# Patient Record
Sex: FEMALE | Race: BLACK OR AFRICAN AMERICAN | NOT HISPANIC OR LATINO | Employment: UNEMPLOYED | URBAN - METROPOLITAN AREA
[De-identification: names, ages, dates, MRNs, and addresses within clinical notes are randomized per-mention and may not be internally consistent; named-entity substitution may affect disease eponyms.]

---

## 2017-06-22 ENCOUNTER — TELEPHONE (OUTPATIENT)
Dept: NEUROLOGY | Facility: CLINIC | Age: 67
End: 2017-06-22

## 2017-06-22 NOTE — TELEPHONE ENCOUNTER
----- Message from Shona Licea sent at 6/22/2017 11:56 AM CDT -----  Contact: Patient [phone]  Patient is calling to get a new patient appt due to trouble with balance memory and visually focusing, patient would an caleb for as early as possible. Please call

## 2017-06-23 ENCOUNTER — TELEPHONE (OUTPATIENT)
Dept: NEUROLOGY | Facility: CLINIC | Age: 67
End: 2017-06-23

## 2017-06-23 ENCOUNTER — TELEPHONE (OUTPATIENT)
Dept: TRANSPLANT | Facility: CLINIC | Age: 67
End: 2017-06-23

## 2017-06-23 NOTE — TELEPHONE ENCOUNTER
Spoke with the patient about her current  symptoms that she was having currently (patient reports having problems with GERD).  Patient informed that a [facility] doctor will be the best choice to treat that problem.  Patient stated that she currently has an appointment with the GI doctor.

## 2017-06-23 NOTE — TELEPHONE ENCOUNTER
----- Message from Adina Santos sent at 6/23/2017 10:55 AM CDT -----  Contact: patient   Patient calling to schedule an appt to see a liver specialist. Patient states she having problems digesting and has a history of diabetes and pancrease problems. Please call

## 2017-06-23 NOTE — TELEPHONE ENCOUNTER
----- Message from Yissel Rodriguez sent at 6/22/2017  1:40 PM CDT -----  Contact: Pt   Pt would like to schedule a new patient appt for memory loss & dizziness,  [facility] insurance, she requesting 1st or last appt of the day     Pt contact number [phone]  Thanks

## 2017-06-27 ENCOUNTER — OFFICE VISIT (OUTPATIENT)
Dept: OTOLARYNGOLOGY | Facility: CLINIC | Age: 67
End: 2017-06-27
Payer: COMMERCIAL

## 2017-06-27 VITALS
TEMPERATURE: 99 F | BODY MASS INDEX: 52.82 KG/M2 | DIASTOLIC BLOOD PRESSURE: 73 MMHG | HEART RATE: 74 BPM | SYSTOLIC BLOOD PRESSURE: 124 MMHG | WEIGHT: 288.81 LBS

## 2017-06-27 DIAGNOSIS — R49.9 CHANGE IN VOICE: ICD-10-CM

## 2017-06-27 DIAGNOSIS — F44.4 FUNCTIONAL DYSPHONIA: Primary | ICD-10-CM

## 2017-06-27 PROCEDURE — 99999 PR PBB SHADOW E&M-EST. PATIENT-LVL III: CPT | Mod: PBBFAC,,, | Performed by: OTOLARYNGOLOGY

## 2017-06-27 PROCEDURE — 99499 UNLISTED E&M SERVICE: CPT | Mod: S$GLB,,, | Performed by: OTOLARYNGOLOGY

## 2017-06-27 PROCEDURE — 31575 DIAGNOSTIC LARYNGOSCOPY: CPT | Mod: S$GLB,,, | Performed by: OTOLARYNGOLOGY

## 2017-06-27 PROCEDURE — 99203 OFFICE O/P NEW LOW 30 MIN: CPT | Mod: 25,S$GLB,, | Performed by: OTOLARYNGOLOGY

## 2017-06-27 RX ORDER — HYDROXYZINE HYDROCHLORIDE 25 MG/1
25 TABLET, FILM COATED ORAL 4 TIMES DAILY
Qty: 60 TABLET | Refills: 3 | Status: SHIPPED | OUTPATIENT
Start: 2017-06-27 | End: 2017-11-07 | Stop reason: SDUPTHER

## 2017-06-27 NOTE — PROGRESS NOTES
"Subjective:       Patient ID: Ni Montejo is a 66 y.o. female.    Chief Complaint: Consult (throat problems/voice change)    HPI     Ni Montejo is a 66 year old female who was [facility] for a 1 year history of vocal changes. 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 She has dysphagia to foods such as rice. She feels that it will get stuck (pointing to her upper chest) and she will have to swallow water to help "push the food down." When this happens, it is painful. She takes Atarax for itching and feels this helps relax her throat. She is occasionally short of breath with exertion. She does complain of acid reflux and is set up to see GI. She is a non smoker.     Past Medical History:   Diagnosis Date    Hypertension     Memory changes        History reviewed. No pertinent surgical history.      Current Outpatient Prescriptions:     hydrOXYzine HCl (ATARAX) 25 MG tablet, Take 1 tablet (25 mg total) by mouth 4 (four) times daily., Disp: 60 tablet, Rfl: 3    lisinopril-hydrochlorothiazide (PRINZIDE,ZESTORETIC) 20-25 mg Tab, Take 1 tablet by mouth once daily., Disp: , Rfl:     multivitamin capsule, Take 1 capsule by mouth once daily., Disp: , Rfl:     Review of patient's allergies indicates:   Allergen Reactions    Ducodyl [bisacodyl]     Iodine and iodide containing products      Seafood       Social History     Social History    Marital status: Single     Spouse name: N/A    Number of children: N/A    Years of education: N/A     Occupational History    Not on file.     Social History Main Topics    Smoking status: Never Smoker    Smokeless tobacco: Never Used    Alcohol use No    Drug use: Unknown    Sexual activity: Not on file     Other Topics Concern    Not on file     Social History Narrative    No narrative on file       Family History   Problem " Relation Age of Onset    Coronary artery disease Mother     Coronary artery disease Father        Review of Systems   Constitutional: Negative for appetite change, chills, diaphoresis, fatigue, fever and unexpected weight change.   HENT: Positive for trouble swallowing and voice change. Negative for congestion, dental problem, drooling, ear discharge, ear pain, facial swelling, hearing loss, mouth sores, nosebleeds, postnasal drip, rhinorrhea, sinus pressure, sneezing, sore throat and tinnitus.    Eyes: Positive for visual disturbance. Negative for pain, discharge, redness and itching.   Respiratory: Positive for shortness of breath. Negative for cough.    Cardiovascular: Positive for palpitations. Negative for chest pain.   Gastrointestinal: Negative for abdominal distention, abdominal pain, diarrhea, nausea and vomiting.   Endocrine: Negative for cold intolerance and heat intolerance.   Genitourinary: Negative for difficulty urinating.   Musculoskeletal: Negative for neck pain and neck stiffness.   Skin: Negative for rash.   Neurological: Negative for dizziness, weakness and headaches.   Hematological: Negative for adenopathy.       Objective:      Physical Exam   Constitutional: She is oriented to person, place, and time. She appears well-developed and well-nourished. She is cooperative. She does not appear ill. No distress.   HENT:   Head: Normocephalic and atraumatic.   Right Ear: Hearing, tympanic membrane, external ear and ear canal normal.   Left Ear: Hearing, tympanic membrane, external ear and ear canal normal.   Nose: Nose normal. No mucosal edema, rhinorrhea or nasal deformity. No epistaxis.  No foreign bodies. Right sinus exhibits no maxillary sinus tenderness and no frontal sinus tenderness. Left sinus exhibits no maxillary sinus tenderness and no frontal sinus tenderness.   Mouth/Throat: Uvula is midline, oropharynx is clear and moist and mucous membranes are normal. Mucous membranes are not pale,  not dry and not cyanotic. She does not have dentures. No oral lesions. No trismus in the jaw. Normal dentition. No uvula swelling or dental caries. No oropharyngeal exudate, posterior oropharyngeal edema, posterior oropharyngeal erythema or tonsillar abscesses.   Eyes: Conjunctivae are normal. Right eye exhibits no discharge. Left eye exhibits no discharge. No scleral icterus.   Neck: Trachea normal, normal range of motion and phonation normal. Neck supple. No JVD present. No tracheal tenderness present. No tracheal deviation present. No thyroid mass and no thyromegaly present.   Salivary glands - there are no lesions or asymmetric findings in the submandibular or parotid glands     Cardiovascular: Normal rate.    Pulmonary/Chest: Effort normal. No stridor. No respiratory distress.   Lymphadenopathy:        Head (right side): No submental, no submandibular, no tonsillar and no preauricular adenopathy present.        Head (left side): No submental, no submandibular, no tonsillar and no preauricular adenopathy present.     She has no cervical adenopathy.   Neurological: She is alert and oriented to person, place, and time. No cranial nerve deficit.   Skin: Skin is warm and dry. No rash noted. She is not diaphoretic. No erythema. No pallor.   Psychiatric: She has a normal mood and affect. Her behavior is normal. Thought content normal.   Vitals reviewed.      Procedure: Flexible laryngoscopy  In order to fully examine the upper aerodigestive tract, including the larynx, in a patient with a hyperactive gag reflex, flexible endoscopy is required.  After explaining the procedure and obtaining verbal consent, a timeout was performed with the patient's participation according to the universal protocol. Both nasal cavities were anesthetized with 4% Xylocaine spray mixed with Cheo-Synephrine. The flexible laryngoscope was inserted into the nasal cavity and advanced to visualize the nasal cavity, nasopharynx, the posterior  oropharynx, hypopharynx, and the endolarynx with the above findings noted. The scope was removed and the procedure terminated. The patient tolerated this procedure well without apparent complication.     Nasopharynx - the torus is clear. There are no lesions of the posterior wall.   Oropharynx - no lesions of the tongue base. There is no obvious fullness or asymmetry.  Hypopharynx - there are no lesions of the pyriform sinuses or postcricoid region    Larynx - there are no lesions of the supraglottic or glottic larynx. Vocal fold mobility is normal with complete closure.     Assessment:       1. Functional dysphonia    2. Change in voice        Plan:       Ms. Montejo is a 66 year old female with some noted vocal changes. No laryngeal pathology on flexible examination today. Will refer for speech therapy evaluation and management. Patient instructed to follow up with GI as scheduled.

## 2017-06-28 ENCOUNTER — TELEPHONE (OUTPATIENT)
Dept: SPEECH THERAPY | Facility: HOSPITAL | Age: 67
End: 2017-06-28

## 2017-06-30 ENCOUNTER — OFFICE VISIT (OUTPATIENT)
Dept: OPTOMETRY | Facility: CLINIC | Age: 67
End: 2017-06-30
Payer: COMMERCIAL

## 2017-06-30 DIAGNOSIS — I10 ESSENTIAL HYPERTENSION: ICD-10-CM

## 2017-06-30 DIAGNOSIS — H52.203 MYOPIA OF BOTH EYES WITH ASTIGMATISM AND PRESBYOPIA: ICD-10-CM

## 2017-06-30 DIAGNOSIS — H52.13 MYOPIA OF BOTH EYES WITH ASTIGMATISM AND PRESBYOPIA: ICD-10-CM

## 2017-06-30 DIAGNOSIS — H25.13 CATARACT, NUCLEAR SCLEROTIC SENILE, BILATERAL: Primary | ICD-10-CM

## 2017-06-30 DIAGNOSIS — H25.019 CATARACT CORTICAL, SENILE, UNSPECIFIED LATERALITY: ICD-10-CM

## 2017-06-30 DIAGNOSIS — H52.4 MYOPIA OF BOTH EYES WITH ASTIGMATISM AND PRESBYOPIA: ICD-10-CM

## 2017-06-30 PROCEDURE — 92004 COMPRE OPH EXAM NEW PT 1/>: CPT | Mod: S$GLB,,, | Performed by: OPTOMETRIST

## 2017-06-30 PROCEDURE — 99999 PR PBB SHADOW E&M-EST. PATIENT-LVL I: CPT | Mod: PBBFAC,,, | Performed by: OPTOMETRIST

## 2017-06-30 PROCEDURE — 99499 UNLISTED E&M SERVICE: CPT | Mod: S$GLB,,, | Performed by: OPTOMETRIST

## 2017-06-30 NOTE — PROGRESS NOTES
HPI     FAVIAN about 8 yrs. Ago.  Vision blurred in AM, helps with blinking. Had   glasses about 6 yrs. Ago.    (+) Blur  (-)Pain   (-) itching, burning, watering, discomfort   (-) Flashes   (+) floaters     Last edited by Kiesha Triplett, OD on 6/30/2017  8:55 AM. (History)            Assessment /Plan     For exam results, see Encounter Report.    Cataract, nuclear sclerotic senile, bilateral    Cataract cortical, senile, unspecified laterality    Essential hypertension    Myopia of both eyes with astigmatism and presbyopia    1-2) Educated pt on presence of cataracts and effects on vision. No surgery at this time. Recheck in 6 months -1 year.    3) (-) hemes/CWS, (-) NVD/NVE, (-) H/B/T, No HTN Retinopathy, pt edu on cont. Care and compliance with meds, Monitor yearly     4) Final Rx given, SV distance and SV near, Recheck yearly    RTC 1 year or soon if Cataract symptoms worsen

## 2017-07-07 ENCOUNTER — OFFICE VISIT (OUTPATIENT)
Dept: PODIATRY | Facility: CLINIC | Age: 67
End: 2017-07-07
Payer: COMMERCIAL

## 2017-07-07 VITALS
DIASTOLIC BLOOD PRESSURE: 71 MMHG | SYSTOLIC BLOOD PRESSURE: 119 MMHG | HEART RATE: 66 BPM | BODY MASS INDEX: 53.42 KG/M2 | HEIGHT: 62 IN | WEIGHT: 290.31 LBS

## 2017-07-07 DIAGNOSIS — B35.3 TINEA PEDIS OF BOTH FEET: ICD-10-CM

## 2017-07-07 DIAGNOSIS — M21.70 LOWER LIMB LENGTH DIFFERENCE: ICD-10-CM

## 2017-07-07 DIAGNOSIS — B35.1 ONYCHOMYCOSIS DUE TO DERMATOPHYTE: Primary | ICD-10-CM

## 2017-07-07 DIAGNOSIS — Z98.1 HISTORY OF ANKLE FUSION: ICD-10-CM

## 2017-07-07 PROCEDURE — 99999 PR PBB SHADOW E&M-EST. PATIENT-LVL III: CPT | Mod: PBBFAC,,, | Performed by: PODIATRIST

## 2017-07-07 PROCEDURE — 99203 OFFICE O/P NEW LOW 30 MIN: CPT | Mod: S$GLB,,, | Performed by: PODIATRIST

## 2017-07-07 RX ORDER — ECONAZOLE NITRATE 10 MG/G
CREAM TOPICAL 2 TIMES DAILY
Qty: 30 G | Refills: 6 | Status: SHIPPED | OUTPATIENT
Start: 2017-07-07 | End: 2017-07-11

## 2017-07-07 NOTE — PROGRESS NOTES
"Subjective:      Patient ID: Ni Montejo is a 66 y.o. female.    Chief Complaint: Nail Problem (discolored, thick toenails/ PCP Dr. Heart); Nail Care; and Foot Problem (dry skin???)    Ni is a 66 y.o. female who presents to the clinic complaining of thick and discolored toenails and dry, thick skin on soles on both feet. This has worsened over the last 20 years. No treatment. Ni is inquiring about treatment options.    She also requests prescription for shoe lift for right foot. Right leg is at least 1/4" shorter 2/2 fracture and then fusion which occurred in the mid 80's. Denies ankle pain. Wears SAS shoes.  She denies any history of lower extremity wounds, infections and/or injury.      Review of Systems   Constitution: Negative for chills, fever, weakness, malaise/fatigue and night sweats.   Cardiovascular: Positive for leg swelling. Negative for chest pain, orthopnea and palpitations.   Respiratory: Negative for cough, shortness of breath and wheezing.    Skin: Positive for color change, dry skin, itching and nail changes. Negative for poor wound healing and rash.   Musculoskeletal: Negative for arthritis, gout, joint pain, joint swelling, muscle weakness and myalgias.   Gastrointestinal: Negative for abdominal pain, constipation and nausea.   Neurological: Negative for disturbances in coordination, dizziness, focal weakness, numbness and tremors.           Objective:      Physical Exam   Constitutional: She is oriented to person, place, and time. Vital signs are normal. She appears well-developed. She is cooperative. No distress.   Cardiovascular: Intact distal pulses.    Pulses:       Dorsalis pedis pulses are 2+ on the right side, and 2+ on the left side.        Posterior tibial pulses are 2+ on the right side, and 2+ on the left side.   Musculoskeletal:        Right ankle: She exhibits decreased range of motion and swelling.        Left ankle: Normal.        Right foot: There is normal range of motion, " "no bony tenderness, normal capillary refill, no crepitus and no deformity.        Left foot: There is normal range of motion, no bony tenderness, normal capillary refill, no crepitus and no deformity.   Moderate edema to right lower leg and ankle. No pain with ankle ROM, manipulation. Minimal ROM available.   Right leg at least 1/4" shorter than left.        Neurological: She is alert and oriented to person, place, and time. She has normal strength. No sensory deficit. She exhibits normal muscle tone. Gait normal.   Reflex Scores:       Achilles reflexes are 2+ on the right side and 2+ on the left side.  Negative Tinels sign and Boris's click, bilat.   Skin: Skin is intact. No ecchymosis and no lesion noted. No erythema. Nails show no clubbing.   Toenails 1-5 bilaterally are elongated, thickened by 2-3 mm, discolored/yellowed, dystrophic, brittle with subungual debris.    Plantar xerosis and scaling, bilateral.                Assessment:       Encounter Diagnoses   Name Primary?    Onychomycosis due to dermatophyte Yes    Tinea pedis of both feet     Lower limb length difference     History of ankle fusion          Plan:       Ni was seen today for nail problem, nail care and foot problem.    Diagnoses and all orders for this visit:    Onychomycosis due to dermatophyte  -     econazole nitrate 1 % cream; Apply topically 2 (two) times daily. Feet and toenails    Tinea pedis of both feet  -     econazole nitrate 1 % cream; Apply topically 2 (two) times daily. Feet and toenails    Lower limb length difference  -     ORTHOTIC DEVICE (DME)    History of ankle fusion  -     ORTHOTIC DEVICE (DME)      I counseled the patient on her conditions, their implications and medical management.    Discussed all treatment options such as topical, oral, laser treatments vs surgical removal of nail permanent vs non-permanent in detail pertaining to nail fungus    She will use topical treatments and consider oral lamisil if no " improvement.    Prescription for shoe lift dispensed. She is not interested in rocker sole shoes or xray today.    Side effects of medication(s) were discussed in detail and patient voiced understanding. Patient will call back for any issues or complications.

## 2017-07-07 NOTE — PATIENT INSTRUCTIONS
Vicks vapor rub, tea tree oil or apple cider soaks to nails    Athletes Foot    Athletes foot (tinea pedis) is caused by a fungal infection in the skin. It affects the skin between the toes, causing cracks in the skin called fissures. It can also affect the bottom of the foot where it causes dry white scales and peeling of the skin. This infection is more likely to occur when the foot is in hot, sweaty socks and shoes for long periods of time. You may feel itching and burning between your toes. This infection is treated with skin creams or medicine taken by mouth.  Home care  The following are general care guidelines:  · It is important to keep the feet dry. Use absorbent cotton socks and change them if they become sweaty. Or wear an open-toe shoe or sandal. Wash the feet at least once a day with soap and water.  · Apply the antifungal cream as prescribed. Some antifungal creams are available without a prescription.  · It may take a week before the rash starts to improve. It can take about 3 to 4 weeks to completely clear. Continue the medicine until the rash is all gone.  · Use over-the-counter antifungal powders or sprays on your feet after exposure to high-risk environments, such as public showers, gyms, and locker rooms. This can help prevent future infections. Wearing appropriate shoes in these situations can help.  Prevention  The following tips may help prevent athletes foot:  · Don't share shoes or socks with someone who has athlete's foot.  · Don't walk barefoot in places where a fungal infection can spread quickly such as locker rooms, showers, and swimming pools.  · Change socks regularly.  · Alternate shoes to assist in drying.  Follow-up care  Follow up with your healthcare provider as recommended if the rash does not improve after 10 days of treatment, or if the rash continues to spread.  When to seek medical care  Get medical attention right away if any of the following occur:  · Fever of 100.4°F  (38°C) or higher, or as directed  · Increasing redness or swelling of the foot  · Infection comes back soon after treatment  · Pus draining from cracks in the skin  Date Last Reviewed: 8/1/2016 © 2000-2016 [employer]. [address] [place] [address], [place] [address]. All rights reserved. This information is not intended as a substitute for professional medical care. Always follow your healthcare professional's instructions.        Nail Fungal Infection  A nail fungal infection changes the way fingernails and toenails look. They may thicken, discolor, change shape, or split. This condition is hard to treat because nails grow slowly and have limited blood supply. The infection often comes back after treatment.  There are 2 types of medicines used to treat this condition:  · Topical anti-fungal medicines. These are applied to the surface of the skin and nail area. These medicines are not very effective because they cant get deep into the nail.  · Oral antifungal medicines. These medicines work better because they go into the nail from the inside out. But the infection may still come back. It may take 9 to 12 months for your nail to look normal again. This means you are cured. You can repeat treatment if needed. Most people take these medicines without any problems. It is rare to stop therapy because of side effects. But your healthcare provider may give you some monitoring tests. Talk about possible side effects with your provider before starting treatment.  If medicines fail, the nail can be removed surgically or chemically. These methods physically remove the fungus from the body. This helps medical treatment be more effective.  Home care  · Use medicines exactly as directed for as long as directed. Treating a fungal infection can take longer than other kinds of infections.  · Smoking is a risk factor for fungal infection. This is one more reason to quit.  · Wear absorbent socks, and shoes that let your  feet breathe. Sweaty feet increase your risk of fungal infection. They also make an existing infection harder to treat.  · Use footwear when in damp public places like swimming pools, gyms, and shower rooms. This will help you avoid the fungus that grows there.  · Don't share nail clippers or scissors with others.  Follow-up care  Follow up with your healthcare provider, or as advised.  When to seek medical advice  Call your healthcare provider right away if any of these occur:  · Skin by the nail becomes red, swollen, painful, or drains pus (a creamy yellow or white liquid)  · Side effects from oral anti-fungal medicines  Date Last Reviewed: 8/1/2016  © 0352-9430 [employer]. [address] [place] [address], [place] [address]. All rights reserved. This information is not intended as a substitute for professional medical care. Always follow your healthcare professional's instructions.

## 2017-07-11 ENCOUNTER — TELEPHONE (OUTPATIENT)
Dept: PODIATRY | Facility: CLINIC | Age: 67
End: 2017-07-11

## 2017-07-11 DIAGNOSIS — B35.1 ONYCHOMYCOSIS DUE TO DERMATOPHYTE: Primary | ICD-10-CM

## 2017-07-11 DIAGNOSIS — B35.3 TINEA PEDIS OF BOTH FEET: ICD-10-CM

## 2017-07-11 RX ORDER — KETOCONAZOLE 20 MG/G
CREAM TOPICAL DAILY
Qty: 30 G | Refills: 5 | Status: SHIPPED | OUTPATIENT
Start: 2017-07-11 | End: 2017-09-25

## 2017-07-11 NOTE — TELEPHONE ENCOUNTER
----- Message from Jass Hartley sent at 7/11/2017  8:31 AM CDT -----  Contact: self/home  Pt would like to speak with you regarding a more inexpensive medication.

## 2017-07-17 ENCOUNTER — OFFICE VISIT (OUTPATIENT)
Dept: NEUROLOGY | Facility: CLINIC | Age: 67
End: 2017-07-17
Payer: COMMERCIAL

## 2017-07-17 VITALS
HEART RATE: 62 BPM | DIASTOLIC BLOOD PRESSURE: 80 MMHG | WEIGHT: 132.31 LBS | BODY MASS INDEX: 24.35 KG/M2 | SYSTOLIC BLOOD PRESSURE: 134 MMHG | HEIGHT: 62 IN

## 2017-07-17 DIAGNOSIS — R41.3 MEMORY CHANGES: Primary | ICD-10-CM

## 2017-07-17 DIAGNOSIS — R41.840 ATTENTION AND CONCENTRATION DEFICIT: ICD-10-CM

## 2017-07-17 DIAGNOSIS — R05.9 COUGH: Primary | ICD-10-CM

## 2017-07-17 DIAGNOSIS — I10 ESSENTIAL HYPERTENSION: ICD-10-CM

## 2017-07-17 PROCEDURE — 99999 PR PBB SHADOW E&M-EST. PATIENT-LVL III: CPT | Mod: PBBFAC,,, | Performed by: PSYCHIATRY & NEUROLOGY

## 2017-07-17 PROCEDURE — 99204 OFFICE O/P NEW MOD 45 MIN: CPT | Mod: S$GLB,,, | Performed by: PSYCHIATRY & NEUROLOGY

## 2017-07-17 PROCEDURE — 99499 UNLISTED E&M SERVICE: CPT | Mod: S$GLB,,, | Performed by: PSYCHIATRY & NEUROLOGY

## 2017-07-17 NOTE — PROGRESS NOTES
Subjective:       Patient ID: Ni Montejo is a 66 y.o. female.    Chief Complaint:  Memory Loss      History of Present Illness  HPI   This is a 66-year-old -American female who was referred for evaluation of intermittent memory difficulties that she has had for about a year.  She reports occasional forgetfulness in that she might go to the post office and on a couple of occasions could not recall her postbox number but did have delayed recall.  In addition she reports that she has occasional difficulty with recalling recent information or conversations she may have had.  However she is otherwise able to take care of her day-to-day needs without any problems.  She came to the clinic alone, her brother-in-law having driven her here.  She reports being under significant stress.  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  Subsequently she was homeless for a while until her sister and brother-in-law took her in.  Her sister  in  and she has continued to live with her brother-in-law who is employed.  The patient stopped driving in , she had no vehicle.  She is now dependent on her brother-in-law or others, to take her around.  She does not get out of the house very much except to go to the Religion, to go to the post office or bank, and to do the groceries.  She has significant sleep difficulties reporting that she gets very anxious and her mind starts racing.  She does have excessive daytime sleepiness.  She does admit to snoring but denies getting up in the night choking or gasping for air.  She denies any history of head trauma.  She does admit to occasionally feeling off balance especially if she gets up from a sitting position or if she has to walk fast.  She has difficulty climbing stairs.  She has had no falls.  She has difficulty with ambulation because of her obesity.  She has history of hypertension and does report mild elevation  in cholesterol.  She has no diabetes but does have a family history of diabetes.  She has no history of headaches, blackouts or seizures and has had no significant head trauma in the past.       Review of Systems  Review of Systems   Constitutional: Negative.    HENT: Negative.  Negative for hearing loss.    Eyes: Negative.  Negative for visual disturbance.   Respiratory: Negative.  Negative for shortness of breath.    Cardiovascular: Negative.  Negative for chest pain and palpitations.   Gastrointestinal: Negative.    Genitourinary: Negative.    Musculoskeletal: Negative.  Negative for back pain, gait problem and neck pain.   Skin: Negative.    Neurological: Negative.  Negative for tremors, seizures, syncope, speech difficulty, weakness, numbness and headaches.   Psychiatric/Behavioral: Positive for decreased concentration. Negative for confusion.       Objective:      Neurologic Exam     Mental Status   Oriented to person, place, and time.   Registration: recalls 3 of 3 objects. Recall at 5 minutes: recalls 3 of 3 objects. Follows 3 step commands.   Attention: normal. Concentration: normal.   Speech: speech is normal   Level of consciousness: alert  Knowledge: good.   Able to name object. Able to read. Able to repeat. Able to write. Normal comprehension.     Cranial Nerves   Cranial nerves II through XII intact.     Motor Exam   Muscle bulk: normal  Overall muscle tone: normal    Strength   Strength 5/5 throughout.     Sensory Exam   Light touch normal.   Proprioception normal.   Pinprick normal.     Gait, Coordination, and Reflexes     Gait  Gait: non-neurologic (Walks somewhat slowly with a broad-based gait related to morbid obesity)    Coordination   Romberg: negative  Finger to nose coordination: normal    Tremor   Resting tremor: absent  Intention tremor: absent  Action tremor: absent    Reflexes   Right brachioradialis: 1+  Left brachioradialis: 1+  Right biceps: 1+  Left biceps: 1+  Right triceps: 1+  Left  triceps: 1+  Right patellar: 1+  Left patellar: 1+  Right achilles: 0  Left achilles: 0  Right plantar: normal  Left plantar: normal      Physical Exam   Constitutional: She is oriented to person, place, and time. She appears well-developed and well-nourished.   Left-handed   HENT:   Head: Normocephalic and atraumatic.   Neck: Normal range of motion. Neck supple. Carotid bruit is not present.   Neurological: She is oriented to person, place, and time. She has normal strength. She has a normal Finger-Nose-Finger Test and a normal Romberg Test.   Reflex Scores:       Tricep reflexes are 1+ on the right side and 1+ on the left side.       Bicep reflexes are 1+ on the right side and 1+ on the left side.       Brachioradialis reflexes are 1+ on the right side and 1+ on the left side.       Patellar reflexes are 1+ on the right side and 1+ on the left side.       Achilles reflexes are 0 on the right side and 0 on the left side.  Psychiatric: Her speech is normal.   Vitals reviewed.        Assessment:        1. Memory changes    2. Attention and concentration deficit    3. Essential hypertension            Plan:       Discussed with patient.  Her intermittent memory difficulties may be related to attention and concentration difficulty.  Contributing factors may include anxiety and depression.  However the possibility of mild cognitive impairment related to vascular factors need to be considered.  We will get a CT scan of the brain, noncontrast.  Will review blood work done by her primary care physician.  Further recommendations including follow-up after workup is completed.

## 2017-07-17 NOTE — PATIENT INSTRUCTIONS
Discussed with patient.  Her intermittent memory difficulties may be related to attention and concentration difficulty.  Contributing factors may include anxiety and depression.  However the possibility of mild cognitive impairment related to vascular factors need to be considered.  We will get a CT scan of the brain, noncontrast.  Will review blood work done by her primary care physician.  Further recommendations including follow-up after workup is completed.

## 2017-07-18 ENCOUNTER — HOSPITAL ENCOUNTER (OUTPATIENT)
Dept: PULMONOLOGY | Facility: CLINIC | Age: 67
Discharge: HOME | End: 2017-07-18
Payer: COMMERCIAL

## 2017-07-18 ENCOUNTER — OFFICE VISIT (OUTPATIENT)
Dept: PULMONOLOGY | Facility: CLINIC | Age: 67
End: 2017-07-18
Payer: COMMERCIAL

## 2017-07-18 ENCOUNTER — TELEPHONE (OUTPATIENT)
Dept: GASTROENTEROLOGY | Facility: CLINIC | Age: 67
End: 2017-07-18

## 2017-07-18 ENCOUNTER — HOSPITAL ENCOUNTER (OUTPATIENT)
Dept: RADIOLOGY | Facility: HOSPITAL | Age: 67
Discharge: HOME | End: 2017-07-18
Attending: INTERNAL MEDICINE
Payer: COMMERCIAL

## 2017-07-18 ENCOUNTER — CLINICAL SUPPORT (OUTPATIENT)
Dept: SPEECH THERAPY | Facility: HOSPITAL | Age: 67
End: 2017-07-18
Attending: OTOLARYNGOLOGY
Payer: COMMERCIAL

## 2017-07-18 VITALS
HEART RATE: 63 BPM | SYSTOLIC BLOOD PRESSURE: 132 MMHG | DIASTOLIC BLOOD PRESSURE: 81 MMHG | BODY MASS INDEX: 53.55 KG/M2 | OXYGEN SATURATION: 99 % | HEIGHT: 62 IN | WEIGHT: 291 LBS

## 2017-07-18 DIAGNOSIS — R05.9 COUGH: ICD-10-CM

## 2017-07-18 DIAGNOSIS — R06.02 SHORTNESS OF BREATH: Primary | ICD-10-CM

## 2017-07-18 DIAGNOSIS — F44.4 FUNCTIONAL DYSPHONIA: ICD-10-CM

## 2017-07-18 LAB
PRE FEV1 FVC: 86
PRE FEV1: 1.99
PRE FVC: 2.31
PREDICTED FEV1 FVC: 80
PREDICTED FEV1: 2.18
PREDICTED FVC: 2.74

## 2017-07-18 PROCEDURE — 99203 OFFICE O/P NEW LOW 30 MIN: CPT | Mod: S$GLB,,, | Performed by: INTERNAL MEDICINE

## 2017-07-18 PROCEDURE — 94727 GAS DIL/WSHOT DETER LNG VOL: CPT | Mod: S$GLB,,, | Performed by: INTERNAL MEDICINE

## 2017-07-18 PROCEDURE — G9172 VOICE GOAL STATUS: HCPCS | Mod: GN,CH | Performed by: SPEECH-LANGUAGE PATHOLOGIST

## 2017-07-18 PROCEDURE — 71020 XR CHEST PA AND LATERAL: CPT | Mod: 26,,, | Performed by: RADIOLOGY

## 2017-07-18 PROCEDURE — G9173 VOICE D/C STATUS: HCPCS | Mod: GN,CH | Performed by: SPEECH-LANGUAGE PATHOLOGIST

## 2017-07-18 PROCEDURE — 94729 DIFFUSING CAPACITY: CPT | Mod: S$GLB,,, | Performed by: INTERNAL MEDICINE

## 2017-07-18 PROCEDURE — 92524 BEHAVRAL QUALIT ANALYS VOICE: CPT | Mod: GN | Performed by: SPEECH-LANGUAGE PATHOLOGIST

## 2017-07-18 PROCEDURE — 99999 PR PBB SHADOW E&M-EST. PATIENT-LVL III: CPT | Mod: PBBFAC,,, | Performed by: INTERNAL MEDICINE

## 2017-07-18 PROCEDURE — 71020 XR CHEST PA AND LATERAL: CPT | Mod: TC

## 2017-07-18 PROCEDURE — 94010 BREATHING CAPACITY TEST: CPT | Mod: 59,S$GLB,, | Performed by: INTERNAL MEDICINE

## 2017-07-18 PROCEDURE — G9171 VOICE CURRENT STATUS: HCPCS | Mod: GN,CH | Performed by: SPEECH-LANGUAGE PATHOLOGIST

## 2017-07-18 NOTE — TELEPHONE ENCOUNTER
Spoke with patient to reschedule patient's appointment. Patient was made aware of date, time, and location. Verbalized understanding.    ----- Message from Aviva Gastelum sent at 7/18/2017  2:08 PM CDT -----  Contact: [phone]  Patient is returning your call

## 2017-07-18 NOTE — PROGRESS NOTES
Subjective:       Patient ID: Ni Montejo is a 66 y.o. female.    Chief Complaint: Shortness of Breath    HPI:   Ni Montejo is a 66 y.o. female who presents with shortness breath (3months) and hoarseness of voice (more than a year).    She has been evaluated by ENT and found to have normal vocal cord functions, no polyps.  SOB has been intermittent.  More often with exertion, but may occur at rest.  Occasional cough.  No wheezing.   She reports that she notes chest pain along with herwith the dyspnea.  2-pillow orthopnea?.  Chronic lower extremity edema.     Sleep: +snoring, +apneas (occasional), +daytime somnolence, difficulty quieting her mind before bed    Never smoker  Father with lung cancer  Retired , teacher (primarily office work)  No pets at home        Review of Systems   Constitutional: Positive for weight loss. Negative for fever, chills and activity change.   HENT: Positive for postnasal drip. Negative for rhinorrhea, sinus pressure and congestion.    Respiratory: Positive for shortness of breath and dyspnea on extertion. Negative for cough and hemoptysis.    Cardiovascular: Positive for leg swelling. Negative for chest pain.   Genitourinary: Negative for difficulty urinating.   Endocrine: Negative for cold intolerance and heat intolerance.    Musculoskeletal: Negative for joint swelling and myalgias.   Gastrointestinal: Negative for nausea, vomiting and abdominal pain.   Neurological: Negative for headaches.   Hematological: Negative for adenopathy. No excessive bruising.   Psychiatric/Behavioral: Positive for sleep disturbance. Negative for confusion. The patient is nervous/anxious.          Social History   Substance Use Topics    Smoking status: Never Smoker    Smokeless tobacco: Never Used    Alcohol use No       Review of patient's allergies indicates:   Allergen Reactions    Ducodyl [bisacodyl]     Iodine and iodide containing products      Seafood     Past Medical History:    Diagnosis Date    Cataract     Hypertension     Memory changes     Palpitations      History reviewed. No pertinent surgical history.  Current Outpatient Prescriptions on File Prior to Visit   Medication Sig    hydrOXYzine HCl (ATARAX) 25 MG tablet Take 1 tablet (25 mg total) by mouth 4 (four) times daily.    ketoconazole (NIZORAL) 2 % cream Apply topically once daily.    lisinopril-hydrochlorothiazide (PRINZIDE,ZESTORETIC) 20-25 mg Tab Take 1 tablet by mouth once daily.    multivitamin capsule Take 1 capsule by mouth once daily.     No current facility-administered medications on file prior to visit.        Objective:      Physical Exam   Constitutional: She is oriented to person, place, and time. She appears well-developed and well-nourished. She is obese.   HENT:   Head: Normocephalic.   Mouth/Throat: Abnormal dentition (poor dentition, several missing teeth). Mallampati Score: IV.   Neck: Normal range of motion. Neck supple. No tracheal deviation present.   Cardiovascular: Normal rate and regular rhythm.    No murmur heard.  Pulmonary/Chest: Normal expansion, effort normal and breath sounds normal. She has no wheezes. She has no rales.   Abdominal: Soft. Bowel sounds are normal. She exhibits no distension. There is no tenderness.   Musculoskeletal: Normal range of motion. She exhibits no edema.   Neurological: She is alert and oriented to person, place, and time.   Skin: Skin is warm and dry.   Psychiatric: She has a normal mood and affect.   Vitals reviewed.    Personal Diagnostic Review  PFTS (7/18/17):  No evidence of obstruction, perhaps very mild restriction based on TLC, preserved DLCO.  Chest xray (7/18/17): no active pulmonary disease     Assessment:       1. Shortness of breath        Plan:       -Sleep evaluation scheduled for august 2017  -Pulmonary function tests are essentially normal; mild restriction is likely 2/2 obesity (BMI=52)  -CXR has no evidence of pulmonary disease  -Likely needs  ECHO +/- stress; Patient is schedule for cards evaluation next week, will defer to their expertise.

## 2017-07-18 NOTE — PROGRESS NOTES
Referring provider: Dr. Allegra Laguerre  Reason for visit:  Behavioral and qualitative analysis of voice and resonance (CPT 10142)    Subjective / History    Ni Montejo is a 66 y.o. female referred for voice evaluation (CPT 16505) by Dr. Laguerre.  She presents with complaints of a change in her laugh, difficulty sustaining notes while singing, and loss of power in her upper range which began 1+ year ago.  She reports that her laugh is not like it used to be; it is now hoarse, short, and breathless.  She recently had a series of tests for her lungs which per review were unremarkable.  She does not endorse any difficulty or changes in her speaking voice.  She does not sing professionally, but loves to sing and sometimes sings for friends or for weddings.    Swallowing: difficulty with rice/scatter foods  Breathing: sometimes short winded - exertional, sometimes at rest    Smokin packs/day   Caffeine: 2 cups/day   Reflux: no  Water: 24 max oz/day     Past Medical History:   Diagnosis Date    Cataract     Hypertension     Memory changes     Palpitations      Current Outpatient Prescriptions on File Prior to Visit   Medication Sig Dispense Refill    hydrOXYzine HCl (ATARAX) 25 MG tablet Take 1 tablet (25 mg total) by mouth 4 (four) times daily. 60 tablet 3    ketoconazole (NIZORAL) 2 % cream Apply topically once daily. 30 g 5    lisinopril-hydrochlorothiazide (PRINZIDE,ZESTORETIC) 20-25 mg Tab Take 1 tablet by mouth once daily.      multivitamin capsule Take 1 capsule by mouth once daily.       No current facility-administered medications on file prior to visit.        Objective    Perceptual/behavioral assessment  -CAPE-V Overall Score: 2  -Quality: appropriate for age and gender  -Volume: appropriate for age and gender  -Pitch: appropriate for age and gender  -Flexibility: appropriate for age and gender  -Habitual respiratory pattern: diaphragmatic    Acoustic/aerodynamic  "assessment  Multi-Dimensional Voice Program (MDVP) Analysis (sustained "ah")   Baseline Post-trial tx Gender-matched norms (F)   Average fundamental frequency (Fo) 160.715 Hz 183.604 Hz Norm/SD: 243.97 / 27.46   Relative average perturbation 1.513% 0.183% Norm/SD: 0.378 / 0.21   Shimmer percent 7.54% 2.825% Norm/SD: 1.997 / 0.79   Noise to harmonic ratio 0.142 0.096 Norm/SD: 0.112 / 0.01   Voice turbulence index 0.071 0.043 Norm/SD: 0.046 / 0.012     Education / Stimulability Trials   Discussed importance of vocal hygiene including: hydration. Patient was stimulable for intermittently improved voice using SOVT exercises; however, targeted high pitch as speaking voice is not her concern at this time.  Discussed "re-energizing" the system by slightly increasing her amplitude to overcome possible age related changes in the larynx.  Discussed mild age related changes in the larynx.  Encouraged her to continue singing in a comfortable range.     Assessment     Ni Montejo presents with mild voice changes specific to extended notes and high pitches.      G-Codes for Voice  Current status:   - CH   Projected status:  - CH   Discharge status:  - CH     Recommendations / POC    At this time, as patient is not experiencing a functional or significant limitation with her speaking or singing voice, recommend deferring on voice therapy and consideration of pt working with a vocal .  Provided her with exercises at home to target pitch range in a safe, efficient way.  It is possible that in the future she would benefit from a full laryngologic evaluation with Dr. Leung and/or voice therapy with a speech-language pathologist to optimize glottal postures for improved vocal function, vocal efficiency, and ease of phonation.  She should continue the exercises as discussed in session and should contact me with any further questions.    "

## 2017-07-21 ENCOUNTER — HOSPITAL ENCOUNTER (OUTPATIENT)
Dept: RADIOLOGY | Facility: OTHER | Age: 67
Discharge: HOME | End: 2017-07-21
Attending: PSYCHIATRY & NEUROLOGY
Payer: COMMERCIAL

## 2017-07-21 DIAGNOSIS — I10 ESSENTIAL HYPERTENSION: ICD-10-CM

## 2017-07-21 DIAGNOSIS — R41.3 MEMORY CHANGES: ICD-10-CM

## 2017-07-21 PROCEDURE — 70450 CT HEAD/BRAIN W/O DYE: CPT | Mod: TC

## 2017-07-21 PROCEDURE — 70450 CT HEAD/BRAIN W/O DYE: CPT | Mod: 26,,, | Performed by: RADIOLOGY

## 2017-07-24 ENCOUNTER — OFFICE VISIT (OUTPATIENT)
Dept: GASTROENTEROLOGY | Facility: CLINIC | Age: 67
End: 2017-07-24
Payer: COMMERCIAL

## 2017-07-24 ENCOUNTER — TELEPHONE (OUTPATIENT)
Dept: SLEEP MEDICINE | Facility: CLINIC | Age: 67
End: 2017-07-24

## 2017-07-24 ENCOUNTER — TELEPHONE (OUTPATIENT)
Dept: NEUROLOGY | Facility: CLINIC | Age: 67
End: 2017-07-24

## 2017-07-24 ENCOUNTER — OFFICE VISIT (OUTPATIENT)
Dept: CARDIOLOGY | Facility: CLINIC | Age: 67
End: 2017-07-24
Payer: COMMERCIAL

## 2017-07-24 ENCOUNTER — TELEPHONE (OUTPATIENT)
Dept: CARDIOLOGY | Facility: CLINIC | Age: 67
End: 2017-07-24

## 2017-07-24 VITALS
DIASTOLIC BLOOD PRESSURE: 75 MMHG | HEIGHT: 65 IN | HEART RATE: 60 BPM | BODY MASS INDEX: 47.98 KG/M2 | SYSTOLIC BLOOD PRESSURE: 116 MMHG | WEIGHT: 288 LBS

## 2017-07-24 VITALS
WEIGHT: 288 LBS | DIASTOLIC BLOOD PRESSURE: 79 MMHG | HEIGHT: 65 IN | SYSTOLIC BLOOD PRESSURE: 118 MMHG | BODY MASS INDEX: 47.98 KG/M2

## 2017-07-24 DIAGNOSIS — R07.89 COSTOCHONDRAL CHEST PAIN: Primary | ICD-10-CM

## 2017-07-24 DIAGNOSIS — R06.83 SNORING: ICD-10-CM

## 2017-07-24 DIAGNOSIS — E66.01 MORBID OBESITY DUE TO EXCESS CALORIES: ICD-10-CM

## 2017-07-24 DIAGNOSIS — K21.9 GASTROESOPHAGEAL REFLUX DISEASE WITHOUT ESOPHAGITIS: Primary | ICD-10-CM

## 2017-07-24 DIAGNOSIS — S93.04XA: ICD-10-CM

## 2017-07-24 DIAGNOSIS — R01.1 CARDIAC MURMUR: ICD-10-CM

## 2017-07-24 DIAGNOSIS — R07.89 ATYPICAL CHEST PAIN: ICD-10-CM

## 2017-07-24 DIAGNOSIS — Z80.0 FAMILY HISTORY OF COLON CANCER: ICD-10-CM

## 2017-07-24 PROCEDURE — 93000 ELECTROCARDIOGRAM COMPLETE: CPT | Mod: S$GLB,,, | Performed by: INTERNAL MEDICINE

## 2017-07-24 PROCEDURE — 99999 PR PBB SHADOW E&M-EST. PATIENT-LVL II: CPT | Mod: PBBFAC,,, | Performed by: INTERNAL MEDICINE

## 2017-07-24 PROCEDURE — 99499 UNLISTED E&M SERVICE: CPT | Mod: S$GLB,,, | Performed by: INTERNAL MEDICINE

## 2017-07-24 PROCEDURE — 99204 OFFICE O/P NEW MOD 45 MIN: CPT | Mod: S$GLB,,, | Performed by: INTERNAL MEDICINE

## 2017-07-24 PROCEDURE — 99999 PR PBB SHADOW E&M-EST. PATIENT-LVL III: CPT | Mod: PBBFAC,,, | Performed by: INTERNAL MEDICINE

## 2017-07-24 RX ORDER — OMEPRAZOLE 40 MG/1
40 CAPSULE, DELAYED RELEASE ORAL DAILY
Qty: 30 CAPSULE | Refills: 3 | Status: SHIPPED | OUTPATIENT
Start: 2017-07-24 | End: 2017-11-29 | Stop reason: SDUPTHER

## 2017-07-24 NOTE — TELEPHONE ENCOUNTER
----- Message from Nova Pacheco sent at 7/24/2017  3:01 PM CDT -----  Contact: Pt  Pt is calling to speak with nurse in regards to results from CT scan and can be reached at [phone].    Thank you

## 2017-07-24 NOTE — PROGRESS NOTES
Subjective:       Patient ID: Ni Montejo is a 66 y.o. female.    Chief Complaint: Gastroesophageal Reflux    Gastroesophageal Reflux   She complains of belching and heartburn. She reports no chest pain, no sore throat or no wheezing. This is a chronic problem. The problem occurs frequently. The problem has been gradually worsening. The heartburn is located in the substernum. The heartburn wakes her from sleep. The heartburn limits her activity. The heartburn changes with position. The symptoms are aggravated by tight clothes (hunger). Risk factors include obesity and lack of exercise. She has tried a diet change for the symptoms.     Patient requesting colonoscopy.  Patient denies any abdominal pain, weight loss or blood in her stool.  There are multiple family members with history of colon cancer.    Review of Systems   Constitutional: Negative for appetite change and fever.   HENT: Negative for sore throat and trouble swallowing.    Eyes: Negative for photophobia and visual disturbance.   Respiratory: Negative for wheezing.    Cardiovascular: Negative for chest pain and palpitations.   Gastrointestinal: Positive for heartburn.        See HPI for details   Musculoskeletal: Negative for arthralgias, joint swelling and neck pain.   Skin: Negative for rash and wound.   Neurological: Negative for dizziness, tremors and weakness.   Psychiatric/Behavioral: Negative for behavioral problems and suicidal ideas.       Objective:       Vitals:    07/24/17 1551   BP: 118/79       Physical Exam   Constitutional: She is oriented to person, place, and time. She appears well-nourished.   HENT:   Mouth/Throat: Oropharynx is clear and moist.   Eyes: Pupils are equal, round, and reactive to light.   Neck: Neck supple.   Cardiovascular: Normal heart sounds.    Pulmonary/Chest: Effort normal and breath sounds normal.   Abdominal: Soft. She exhibits no mass. There is no tenderness. There is no guarding.   Musculoskeletal: Normal  range of motion.   Lymphadenopathy:     She has no cervical adenopathy.   Neurological: She is alert and oriented to person, place, and time.   Skin: Skin is warm. No rash noted.   Psychiatric: She has a normal mood and affect.       Assessment:       1. Gastroesophageal reflux disease without esophagitis    2. Family history of colon cancer        Plan:       Ni was seen today for gastroesophageal reflux.    Diagnoses and all orders for this visit:    Gastroesophageal reflux disease without esophagitis    Family history of colon cancer    Other orders  -     peg 3350-electrolytes (COLYTE WITH FLAVOR PACKS) 227.1-21.5-6.36 gram SolR; Take 4 L by mouth once.  -     omeprazole (PRILOSEC) 40 MG capsule; Take 1 capsule (40 mg total) by mouth once daily.  -     Case request GI: COLONOSCOPY Goltely prep given, ESOPHAGOGASTRODUODENOSCOPY (EGD)

## 2017-07-24 NOTE — PATIENT INSTRUCTIONS
Tips to Control Acid Reflux    To control acid reflux, youll need to make some basic diet and lifestyle changes. The simple steps outlined below may be all youll need to ease discomfort.  Watch what you eat  · Avoid fatty foods and spicy foods.  · Eat fewer acidic foods, such as citrus and tomato-based foods. These can increase symptoms.  · Limit drinking alcohol, caffeine, and fizzy beverages. All increase acid reflux.  · Try limiting chocolate, peppermint, and spearmint. These can worsen acid reflux in some people.  Watch when you eat  · Avoid lying down for 3 hours after eating.  · Do not snack before going to bed.  Raise your head  Raising your head and upper body by 4 to 6 inches helps limit reflux when youre lying down. Put blocks under the head of your bed frame to raise it.  Other changes  · Lose weight, if you need to  · Dont exercise near bedtime  · Avoid tight-fitting clothes  · Limit aspirin and ibuprofen  · Stop smoking   Date Last Reviewed: 7/1/2016  © 1505-4976 [employer]. [address] [place] [address], [place] [address]. All rights reserved. This information is not intended as a substitute for professional medical care. Always follow your healthcare professional's instructions.

## 2017-07-24 NOTE — TELEPHONE ENCOUNTER
STACIA         Returned call to patient.     Reiterated Dr. Ogden's note- with his recommendations.     Patient will have echocardiogram on Friday, I will call her with results as soon as they are available.
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
## 2017-07-24 NOTE — TELEPHONE ENCOUNTER
----- Message from Umang Douglas MD sent at 7/24/2017  4:34 PM CDT -----  Call patient re: Scan results

## 2017-07-24 NOTE — TELEPHONE ENCOUNTER
----- Message from Aviva Gastelum sent at 7/24/2017  2:48 PM CDT -----  Contact: [phone]  Patient states she was seen today  And states she has heart palpitations and would like to discuss with you

## 2017-07-24 NOTE — PATIENT INSTRUCTIONS
Heart Disease Education    The heart beats 60 to 100 times per minute, 24 hours a day. This equals almost 1000,000 times a day. It pumps blood with oxygen and nutrients to the tissues and organs of the body. But the heart is a muscle and needs its own supply of blood. Blood flow to the heart is supplied by the coronary arteries. Coronary artery disease (atherosclerosis) is a result of cholesterol, saturated fat, and calcium deposits (plaques) that build up inside the walls. This causes inflammation within the coronary arteries. These plaques narrow the artery and reduce blood flow to the heart muscle. The reduction in blood flow to the heart muscle decreases oxygen supply to the heart. If the narrowing is significant enough, the oxygen supply to one or more regions of the heart can be temporarily or permanently shut down. This can cause chest pain, and possibly death of heart tissue (heart attack).  Types of chest pain  Angina is the name for pain in the heart muscle. Angina is a warning sign of serious heart disease. When untreated it can lead to a heart attack, also known as acute myocardial infarction, or AMI. Angina occurs when there is not enough blood and oxygen flowing to the heart for the amount of work it is doing. This most often happens during physical exertion, when the heart is working hardest. It is usually relieved by rest or nitroglycerin. Angina may also occur after a large meal when extra blood is sent to the digestive organs and less goes to the heart. In the case of advanced or unstable heart disease, angina can occur at rest or awaken you from sleep. Angina usually lasts from a few minutes up to 20 minutes or more. When treated early, the effects of angina can be reversed without permanent damage to the heart. Angina is a serious condition and needs to be evaluated by a medical professional immediately.  There are two types of angina -- stable and unstable:  · Stable angina usually occurs  with a predictable level of activity. Being stable, its character, severity, and occurrence do not change much over time. It usually starts with activity, and resolves with rest or taking your medicine as instructed by your doctor. The symptoms usually do not last long.  · Unstable angina changes or gets worse over time. It is different from whatever you are used to. It may feel different or worse, begin without cause, occur with exercise or exertion, wake you up from sleep, and last longer. It may not respond in the same way as it does when you take your usual medicines for an attack. This type of angina can be a warning sign of an impending heart attack.     A heart attack is usually the result of a blood clot that suddenly forms in a coronary artery that has been narrowed with plaque. When this occurs, blood flow may be cut off to a part of the heart muscle, causing the cells to die. This weakens the pumping action of the heart, which affects the delivery of blood to all the other organs in the body including the brain. This damage is not reversible. However, early treatment can limit the amount of damage.  The pain you feel with angina and a heart attack may have a similar quality. However, it is usually different in intensity and duration. Here are some typical descriptions of a heart attack:  · It is most often experienced as a squeezing, crushing, pressure-like sensation in the center of the chest.  · It is sometimes described as something heavy sitting on my chest.  · It may feel more like a bad case of indigestion.  · The pain may spread from the chest to the arm, shoulder, throat or jaw.  · Sometimes the pain is not felt in the chest at all, but only in the arm, shoulder, throat or jaw.  · There may also be nausea, vomiting, dizziness or light-headedness, sweating and trouble breathing.  · Palpitations, or your heart beating rapidly  · A new, irregular heart beat  · Unexplained weakness  You may not be  "able to tell the difference between "bad" angina and a heart attack at home. Seek help if your symptoms are different than usual. Do not be in denial or just try to "tough it out."  Call 911  This is the fastest and safest way to get to the emergency department. The paramedics can also start treatment on the way to the hospital, saving valuable time for your heart.  · If the angina gets worse, if it continues, or if it stops and returns, call 911 immediately. Do not delay. You may be having a heart attack.  · After you call 911, take a second tablet or spray unless instructed otherwise. When repeating doses, sit down if possible, because it can make you feel lightheaded or dizzy. Wait another 5 minutes. If the angina still does not go away, take a third tablet or spray. Do not take more than 3 tablets or sprays within 15 minutes. Stay on the phone with 911 for further instruction.  · Your healthcare provider may give you slightly different instructions than those above. If so, follow them carefully.  Do not wait until symptoms become severe to call 911.  Other reasons to call 911 include:  · Trouble breathing  · Feeling lightheaded, faint, or dizzy  · Rapid heart beat  · Slower than usual heart rate compared to your normal  · Angina with weakness, dizziness, fainting, heavy sweating, nausea, or vomiting  · Extreme drowsiness, confusion  · Weakness of an arm or leg or one side of the face  · Difficulty with speech or vision  When to seek medical care  Remember, the signs and symptoms of a heart attack are not always like they are on TV. Sometimes they are not so obvious. You may only feel weak, or just not right. If it is not clear or if you have any doubt, call for advice.  · Seek help if there is a change in the type of pain, if it feels different, or if your symptoms are mild.  · Do not drive yourself. Have someone else drive you. If no one can drive, call 911.  · Do not delay. Fast diagnosis and treatment can " "prevent or limit the amount of heart damage during a heart attack.  · Do not go to your doctor's office or a clinic as they may not be able to provide all the testing and treatment required for this condition.  · If your doctor has given you medicine to take when symptoms occur, take them but don't delay getting help trying to locate medicines.  What happens in the emergency department  The emergency department is connected to your local emergency medical system (EMS) through 911. That's why during a cardiac emergency, calling 911 is the fastest way to get help. The goal of the emergency department is to rapidly screen, evaluate, and treat people.  Once you are there, an electrocardiogram (ECG or heart tracing) will be done. Blood samples may be taken to look for the presence of heart enzymes that leak from damaged heart cells and show if a heart attack is occurring. You will often be evaluated by a heart specialist (cardiologist) who decides the best course of action. In the case of severe angina or early heart attack, and depending on the circumstances, powerful "clot busting" medicines can be used to dissolve blood clots in the coronary artery. In other cases, you may be taken to a cardiac catheterization lab. Here, a tiny balloon-tipped catheter is advanced through blood vessels to the heart. There the balloon is inflated pushing open the blood vessel restoring blood flow.  Risk factors for heart disease  Risk factors for heart disease are a combination of genetic and lifestyle. Many risk factors work by either directly or indirectly damaging the blood vessels of the heart, or by increasing the risk of forming blood or cholesterol clots, which then clog up and block the arteries.     Examples of physical lifestyle risk factors:  · Cigarette smoking  · High blood pressure  · High blood cholesterol  · Use of stimulant drugs such as cocaine, crack, and amphetamines  · Eating a high-fat, high-cholesterol " meal  · Diabetes   · Obesity which increases risk for diabetes and high blood pressure  · Lack of regular physical activity     Examples of emotional lifestyle factors:  · Chronic high stress levels release stress hormones. These raise blood pressure and cholesterol level and makes blood clot more easily.  · Held-in anger, hostile or cynical attitude  · Social and emotional isolation, lack of intimacy  · Loss of relationship  · Depression  Other factors that increase the risk of heart attack that you cannot control :  · Age. The older you get beyond 40, the greater is your risk of significant coronary artery disease.  · Gender. More men than women get heart disease; but once past menopause, women who are not taking estrogen replacement have the same risk as men for a heart attack.  · Family history. If your mother, father, brother or sister has coronary artery disease, your risk of having it is higher than a person your age without this family history.  What can you do to decrease your risk  To reduce your risk of heart disease:  · Get regular checkups with your doctor.  · Take your medicines for blood pressure, cholesterol or diabetes as directed.  · Watch your diet. Eat a heart healthy diet choosing fresh foods, less salt, cholesterol, and fat  · Stop smoking. Get help if needed.  · Get regular exercise.  · Manage stress.  · Carry a list of medicines and doses in your wallet.  Date Last Reviewed: 12/30/2015  © 4708-2537 [employer]. [address] [place] [address], [place] [address]. All rights reserved. This information is not intended as a substitute for professional medical care. Always follow your healthcare professional's instructions.

## 2017-07-25 ENCOUNTER — TELEPHONE (OUTPATIENT)
Dept: GASTROENTEROLOGY | Facility: CLINIC | Age: 67
End: 2017-07-25

## 2017-07-25 ENCOUNTER — TELEPHONE (OUTPATIENT)
Dept: NEUROLOGY | Facility: CLINIC | Age: 67
End: 2017-07-25

## 2017-07-25 NOTE — TELEPHONE ENCOUNTER
----- Message from Rashard Argueta sent at 7/25/2017  9:00 AM CDT -----  Contact: Self [phone]  Patient is returning a missed call from Dr Douglas regarding test results. pls return call

## 2017-07-25 NOTE — TELEPHONE ENCOUNTER
Spoke with patient she was able to get prescription and changed pharmacy in chart.    ----- Message from Viky Brooks sent at 7/25/2017  4:48 PM CDT -----  Contact: Self [phone]  Calling to talk to nurse she has some information she would like to give you. Please advice

## 2017-07-26 NOTE — TELEPHONE ENCOUNTER
I called her      She expressed verbal understanding      She admitted that at times she is forgetful      She wanted to make sure I was aware of her palpitations as well      Thanks        ZN

## 2017-07-28 ENCOUNTER — HOSPITAL ENCOUNTER (OUTPATIENT)
Dept: CARDIOLOGY | Facility: CLINIC | Age: 67
Discharge: HOME | End: 2017-07-28
Attending: INTERNAL MEDICINE
Payer: COMMERCIAL

## 2017-07-28 DIAGNOSIS — R01.1 CARDIAC MURMUR: ICD-10-CM

## 2017-07-28 DIAGNOSIS — R07.89 ATYPICAL CHEST PAIN: ICD-10-CM

## 2017-07-28 LAB
ESTIMATED PA SYSTOLIC PRESSURE: 36.64
MITRAL VALVE MOBILITY: NORMAL
RETIRED EF AND QEF - SEE NOTES: 65 (ref 55–65)
TRICUSPID VALVE REGURGITATION: NORMAL

## 2017-07-28 PROCEDURE — 93306 TTE W/DOPPLER COMPLETE: CPT | Mod: S$GLB,,, | Performed by: INTERNAL MEDICINE

## 2017-08-04 ENCOUNTER — TELEPHONE (OUTPATIENT)
Dept: GASTROENTEROLOGY | Facility: CLINIC | Age: 67
End: 2017-08-04

## 2017-08-04 NOTE — TELEPHONE ENCOUNTER
Spoke with pt and she stated that she would like to reschedule her procedures to 9/13/17 with Dr. Oden. Pt stated that she understands the prep and instructions.            ----- Message from Viky Brooks sent at 8/4/2017 11:21 AM CDT -----  Contact: Self [phone]  Calling to talk to nurse has personal questions she said its urgent.

## 2017-08-22 ENCOUNTER — OFFICE VISIT (OUTPATIENT)
Dept: SLEEP MEDICINE | Facility: CLINIC | Age: 67
End: 2017-08-22
Payer: COMMERCIAL

## 2017-08-22 VITALS
SYSTOLIC BLOOD PRESSURE: 129 MMHG | HEIGHT: 65 IN | HEART RATE: 53 BPM | DIASTOLIC BLOOD PRESSURE: 68 MMHG | WEIGHT: 284.19 LBS | BODY MASS INDEX: 47.35 KG/M2

## 2017-08-22 DIAGNOSIS — G47.30 SLEEP APNEA, UNSPECIFIED TYPE: Primary | ICD-10-CM

## 2017-08-22 PROCEDURE — 3074F SYST BP LT 130 MM HG: CPT | Mod: S$GLB,,, | Performed by: PSYCHIATRY & NEUROLOGY

## 2017-08-22 PROCEDURE — 99204 OFFICE O/P NEW MOD 45 MIN: CPT | Mod: S$GLB,,, | Performed by: PSYCHIATRY & NEUROLOGY

## 2017-08-22 PROCEDURE — 99499 UNLISTED E&M SERVICE: CPT | Mod: S$GLB,,, | Performed by: PSYCHIATRY & NEUROLOGY

## 2017-08-22 PROCEDURE — 3008F BODY MASS INDEX DOCD: CPT | Mod: S$GLB,,, | Performed by: PSYCHIATRY & NEUROLOGY

## 2017-08-22 PROCEDURE — 99999 PR PBB SHADOW E&M-EST. PATIENT-LVL III: CPT | Mod: PBBFAC,,, | Performed by: PSYCHIATRY & NEUROLOGY

## 2017-08-22 PROCEDURE — 3078F DIAST BP <80 MM HG: CPT | Mod: S$GLB,,, | Performed by: PSYCHIATRY & NEUROLOGY

## 2017-08-22 RX ORDER — ASPIRIN 81 MG/1
81 TABLET ORAL DAILY
COMMUNITY

## 2017-08-22 NOTE — PATIENT INSTRUCTIONS
SLEEP LAB (Simran or Carlos) will contact you to schedulethe HOME sleep study. Their number is [phone] (ext 1). Please call them if you do not hear from them in 10 business days from now.  The [facility] is located on 7th floor of the [facility]; [place] lab is located in [facility].    SLEEP CLINIC (my assistant) will call you when the sleep study results are ready - if you have not heard from us by 2 weeks from the date of the study, please call [phone] (ext 2) or you can use My [facility] to contact me.    You are advised to abstain from driving should you feel sleepy or drowsy.    Please try to take Atarax to see if it helps you sleep better - please use it for your home study

## 2017-08-22 NOTE — PROGRESS NOTES
"   Ni Montejo  was seen at the request of  Self, Aaareferral for sleep evaluation.    08/22/2017 INITIAL HISTORY OF PRESENT ILLNESS:  Ni Montejo is a 66 y.o. female is here to be evaluated for a sleep disorder.       CHIEF COMPLAINT:      The patient's complaints include excessive daytime sleepiness, excessive daytime fatigue, snoring,  witnessed breathing pauses,  gasping for air in sleep and interrupted sleep since  Many years.    Reports difficulty falling and staying asleep.IT is hard for her to 'shut her mind off". She had a stressful experience of becoming homeless.  Reports leg pain (feet hurting) that urged her to move her legs. It is hard for her to say if moving makes it better.  She tends to be a super achiever and push herself hard.     Denies  dry mouth and sore throat  Denies nasal congestion   Denies  morning headaches  Reports  interrupted sleep  Reports frequent leg movements due       The ESS ([facility] Sleepiness Score) taken on initial visit is 14/24    Reports word finding difficulty lately.  Taking Atarax as needed for allergy    SLEEP ROUTINE AND LIFESTYLE 08/22/2017 :    Occupation:     Time to bed - wake up time on a workday : 6 PM to 9-10 PM and then awake - trying to go back to sleep - sometimes can fall back asleep; usually getting up 2 AM (out of bed, or works on paperwork from her bed)  Sleep onset latency: right away  Disruptions or awakenings: every night   Perceived sleep quality: 4/5 -> but "dragging" by noon  Perceived total sleep time:  3  hours.  Daytime naps: sometimes one    Exercise routine: not really  Caffeine:       PREVIOUS SLEEP STUDIES:     Can not recall      DME:       PAST MEDICAL HISTORY:    Active Ambulatory Problems     Diagnosis Date Noted    Hypertension     Memory changes     Attention and concentration deficit 07/17/2017    Cough     Morbid obesity due to excess calories 07/24/2017    Snoring 07/24/2017    Atypical chest pain 07/24/2017    Traumatic " dislocation of right ankle 07/24/2017    Costochondral chest pain 07/24/2017    Cardiac murmur 07/24/2017     Resolved Ambulatory Problems     Diagnosis Date Noted    No Resolved Ambulatory Problems     Past Medical History:   Diagnosis Date    Cataract     Hypertension     Memory changes     Palpitations                 PAST SURGICAL HISTORY:    No past surgical history on file.      FAMILY HISTORY:                Family History   Problem Relation Age of Onset    Coronary artery disease Mother     Cataracts Mother     Hypertension Mother     Heart disease Mother     Coronary artery disease Father     Cancer Father      colon & lung    Cataracts Father     Hypertension Father     Cataracts Sister     Hypertension Sister     Diabetes Paternal Aunt     Diabetes Sister     Hypertension Sister     Amblyopia Neg Hx     Blindness Neg Hx     Glaucoma Neg Hx     Macular degeneration Neg Hx     Retinal detachment Neg Hx     Strabismus Neg Hx     Stroke Neg Hx     Thyroid disease Neg Hx        SOCIAL HISTORY:          Tobacco:   History   Smoking Status    Never Smoker   Smokeless Tobacco    Never Used       alcohol use:    History   Alcohol Use No                   ALLERGIES:    Review of patient's allergies indicates:   Allergen Reactions    Ducodyl [bisacodyl]     Iodine and iodide containing products      Seafood       CURRENT MEDICATIONS:    Current Outpatient Prescriptions   Medication Sig Dispense Refill    aspirin (ECOTRIN) 81 MG EC tablet Take 81 mg by mouth once daily.      hydrOXYzine HCl (ATARAX) 25 MG tablet Take 1 tablet (25 mg total) by mouth 4 (four) times daily. 60 tablet 3    ketoconazole (NIZORAL) 2 % cream Apply topically once daily. 30 g 5    lisinopril-hydrochlorothiazide (PRINZIDE,ZESTORETIC) 20-25 mg Tab Take 1 tablet by mouth once daily.      multivitamin capsule Take 1 capsule by mouth once daily.      omeprazole (PRILOSEC) 40 MG capsule Take 1 capsule (40 mg  "total) by mouth once daily. 30 capsule 3    PHYTONADIONE, VIT K1, (VITAMIN K1 MISC) by Misc.(Non-Drug; Combo Route) route.       No current facility-administered medications for this visit.                       REVIEW OF SYSTEMS:   Sleep related symptoms as per HPI    denies weight gain  Denies dyspnea  Denies palpitations  Denies acid reflux   Denies polyuria  Denies  mood diturbance  Denies  anemia  Denies  muscle pain  Denies  Gait imbalance    Otherwise, a balance of 10 systems reviewed is negative.    PHYSICAL EXAM:  /68 (BP Location: Right arm, Patient Position: Sitting, BP Method: Large (Automatic))   Pulse (!) 53   Ht 5' 5" (1.651 m)   Wt 128.9 kg (284 lb 2.8 oz)   BMI 47.29 kg/m²   GENERAL: Overweight body habitus, well groomed.  HEENT:   HEENT:  Conjunctivae are non-erythematous; Pupils equal, round, and reactive to light; Nose is symmetrical; Nasal mucosa is pink and moist; Septum is midline; Inferior turbinates are hypertrophied; Nasal airflow is normal; Posterior pharynx is pink; Modified Mallampati:III; Posterior palate is low; Tonsils not visualized; Uvula is elongated;Tongue is enlarged; Dentition is fair; No TMJ tenderness; Jaw opening and protrusion without click and without discomfort.  NECK: Supple. Neck circumference is 14.7 inches. No thyromegaly. No palpable nodes.     SKIN: On face and neck: No abrasions, no rashes, no lesions.  No subcutaneous nodules are palpable.  RESPIRATORY: Chest is clear to auscultation.  Normal chest expansion and non-labored breathing at rest.  CARDIOVASCULAR: Normal S1, S2.  No murmurs, gallops or rubs. No carotid bruits bilaterally.  No edema. No clubbing. No cyanosis.    NEURO: Oriented to time, place and person. Normal attention span and concentration. Gait normal.    PSYCH: Affect is full. Mood is normal  MUSCULOSKELETAL: Moves 4 extremities. Gait normal.         Using My [facility]: declined      ASSESSMENT:    1. Sleep Apnea NEC. The patient " "symptomatically has  excessive daytime sleepiness, snoring,  witnessed breathing pauses, excessive daytime fatigue, gasping for air in sleep and interrupted sleep  with exam findings of "a crowded oral airway and elevated body mass index. The patient has medical co-morbidities of hypertension,  which can be worsened by OZIEL. This warrants further investigation for possible obstructive sleep apnea.          PLAN:    Diagnostic: Polysomnogram HOME ([facility]) . The nature of this procedure and its indication was discussed with the patient. she would  like to come discuss PSG results.    OK to take her Atarax at bedtime or a little earlier to help her fall asleep and especially for the sleep study..    Weight loss strategies were discussed in detail.        More than 25 minutes of this 45 minutes visit was spent in counseling: during our discussion today, we talked about the etiology of  OZIEL as well as the potential ramifications of untreated sleep apnea, which could include daytime sleepiness, hypertension, heart disease and/or stroke.  We discussed potential treatment options, which could include weight loss, body positioning, continuous positive airway pressure (CPAP), or referral for surgical consideration. Meanwhile, she  is urged to avoid supine sleep, weight gain and alcoholic beverages since all of these can worsen OZIEL.     Precautions: The patient was advised to abstain from driving should he feel sleepy or drowsy.    Follow up: MD/NP  after the sleep study has been completed.     Thank you for allowing me the opportunity to participate in the care of your patient.    This visit summary will be sent to referring provider via inbasket    "

## 2017-08-30 ENCOUNTER — TELEPHONE (OUTPATIENT)
Dept: SLEEP MEDICINE | Facility: OTHER | Age: 67
End: 2017-08-30

## 2017-09-01 ENCOUNTER — OFFICE VISIT (OUTPATIENT)
Dept: RHEUMATOLOGY | Facility: CLINIC | Age: 67
End: 2017-09-01
Payer: COMMERCIAL

## 2017-09-01 ENCOUNTER — LAB VISIT (OUTPATIENT)
Dept: LAB | Facility: HOSPITAL | Age: 67
End: 2017-09-01
Attending: INTERNAL MEDICINE
Payer: COMMERCIAL

## 2017-09-01 VITALS
BODY MASS INDEX: 46.59 KG/M2 | RESPIRATION RATE: 20 BRPM | DIASTOLIC BLOOD PRESSURE: 68 MMHG | SYSTOLIC BLOOD PRESSURE: 127 MMHG | HEART RATE: 55 BPM | WEIGHT: 280 LBS

## 2017-09-01 DIAGNOSIS — M70.62 TROCHANTERIC BURSITIS OF BOTH HIPS: ICD-10-CM

## 2017-09-01 DIAGNOSIS — M70.61 TROCHANTERIC BURSITIS OF BOTH HIPS: ICD-10-CM

## 2017-09-01 DIAGNOSIS — M25.50 POLYARTHRALGIA: ICD-10-CM

## 2017-09-01 DIAGNOSIS — E66.01 MORBID OBESITY DUE TO EXCESS CALORIES: ICD-10-CM

## 2017-09-01 DIAGNOSIS — M25.50 POLYARTHRALGIA: Primary | ICD-10-CM

## 2017-09-01 LAB
CK SERPL-CCNC: 238 U/L
CRP SERPL-MCNC: 28.3 MG/L
ERYTHROCYTE [SEDIMENTATION RATE] IN BLOOD BY WESTERGREN METHOD: 39 MM/HR

## 2017-09-01 PROCEDURE — 86140 C-REACTIVE PROTEIN: CPT

## 2017-09-01 PROCEDURE — 99999 PR PBB SHADOW E&M-EST. PATIENT-LVL III: CPT | Mod: PBBFAC,,, | Performed by: INTERNAL MEDICINE

## 2017-09-01 PROCEDURE — 99499 UNLISTED E&M SERVICE: CPT | Mod: S$GLB,,, | Performed by: INTERNAL MEDICINE

## 2017-09-01 PROCEDURE — 85651 RBC SED RATE NONAUTOMATED: CPT

## 2017-09-01 PROCEDURE — 99204 OFFICE O/P NEW MOD 45 MIN: CPT | Mod: S$GLB,,, | Performed by: INTERNAL MEDICINE

## 2017-09-01 PROCEDURE — 3008F BODY MASS INDEX DOCD: CPT | Mod: S$GLB,,, | Performed by: INTERNAL MEDICINE

## 2017-09-01 PROCEDURE — 3074F SYST BP LT 130 MM HG: CPT | Mod: S$GLB,,, | Performed by: INTERNAL MEDICINE

## 2017-09-01 PROCEDURE — 36415 COLL VENOUS BLD VENIPUNCTURE: CPT

## 2017-09-01 PROCEDURE — 82085 ASSAY OF ALDOLASE: CPT

## 2017-09-01 PROCEDURE — 3078F DIAST BP <80 MM HG: CPT | Mod: S$GLB,,, | Performed by: INTERNAL MEDICINE

## 2017-09-01 PROCEDURE — 82550 ASSAY OF CK (CPK): CPT

## 2017-09-01 RX ORDER — MAGNESIUM 200 MG
5000 TABLET ORAL DAILY
COMMUNITY

## 2017-09-01 ASSESSMENT — ROUTINE ASSESSMENT OF PATIENT INDEX DATA (RAPID3)
TOTAL RAPID3 SCORE: 4
FATIGUE SCORE: 10
PAIN SCORE: 5
WHEN YOU AWAKENED IN THE MORNING OVER THE LAST WEEK, PLEASE INDICATE THE AMOUNT OF TIME IT TAKES UNTIL YOU ARE AS LIMBER AS YOU WILL BE FOR THE DAY: 5 MIN
AM STIFFNESS SCORE: 1, YES
PSYCHOLOGICAL DISTRESS SCORE: 3.3
MDHAQ FUNCTION SCORE: .6
PATIENT GLOBAL ASSESSMENT SCORE: 5

## 2017-09-05 ENCOUNTER — TELEPHONE (OUTPATIENT)
Dept: GASTROENTEROLOGY | Facility: CLINIC | Age: 67
End: 2017-09-05

## 2017-09-05 NOTE — TELEPHONE ENCOUNTER
Spoke with patient she states that she has instructions and that she needs just a reminder letter mailed to her to confirm date she has scheduled. Advised patient that letter will be mailed to her. Verbalized understanding.    ----- Message from Viky Brooks sent at 9/5/2017 10:15 AM CDT -----  Contact: Self [phone]  Patient Returning Your Phone Call

## 2017-09-05 NOTE — TELEPHONE ENCOUNTER
Left message for patient to return call to office. Instructions have been placed in mail for patient's procedure.    ----- Message from Antonette Heart sent at 9/5/2017  9:10 AM CDT -----  Contact: [phone]/ self   Pt its requesting  instruction for her procedure mail to her . Please advise

## 2017-09-05 NOTE — PROGRESS NOTES
History of present illness: 66-year-old female injured her knee in 2005.  She had a meniscal tear.  She did not have surgery but did have prior injections.  She also had a prior ankle injury that did require surgery.  She has had diffuse aching for the past 5 years.  The pain is been constant.  The pain is worse with activity.  It does not wake her up at night.  She does have some morning pain.  She has 5 minutes of morning stiffness.  She has had no actual joint swelling.  She is taking no medication for the pain.  She has used heat, ice, or topical medications.  The pain does interfere with activity.    She has had no unexplained fevers.  She does complain of frontal headaches.  She has had no rash, conjunctivitis, oral ulcers.  She has dryness of the mouth but not her eyes.  She has had prior pleuritic pain.  She has no Raynaud's phenomena, chronic or bloody diarrhea, vaginal discharge or ulcers.  She has occasional paresthesias in her hands and numbness in her right foot.  Her mother had some type of arthritis.    Systems review:  Gen.: Weight has decreased 20 pounds  GI: Has reflux esophagitis.  No liver problems.  : No kidney or bladder problems    Physical examination:  Skin: No rashes  ENT: Decreased tears and saliva  Chest: Clear to auscultation and percussion  Cardiac: No murmurs, gallops, rubs  Abdomen: She has tenderness in the left upper quadrant.  She has no organomegaly or masses.  No rebound or guarding.  Extremities: No sclerodactyly  Musculoskeletal: Cervical spine is unremarkable.  She has pain on range of motion of the right shoulder.  She is tender posteriorly.  She is tender over the sternoclavicular joint bilaterally.  Elbows, wrists, small joints of the hands are unremarkable.  She has tenderness to palpation of the lower lumbar spine but has full range of motion.  She has pain on lateral bending.  Straight leg raising is negative.  She has full range of motion of the hips without pain on  range of motion.  She is tender over the greater trochanter.  Knees have good range of motion without pain on range of motion.  She has postoperative changes in the right ankle with decreased range of motion.  Small joints the feet are unremarkable.    Assessment: She has diffuse joint aching but no evidence of actual synovitis.  A lot of her pain seems to be periarticular.    Plans: Laboratory studies are obtained.  I have no therapeutic recommendations at this time.  I did not give her regular return appointment, this will depend on the laboratory studies.

## 2017-09-06 ENCOUNTER — TELEPHONE (OUTPATIENT)
Dept: RHEUMATOLOGY | Facility: CLINIC | Age: 67
End: 2017-09-06

## 2017-09-06 LAB — ALDOLASE SERPL-CCNC: 1.9 U/L

## 2017-09-06 NOTE — TELEPHONE ENCOUNTER
"Verbalized to pt. Per Dr. Jones "One of the inflammation test is elevated but this may be related to her weight.  The muscle enzymes are normal." Pt. Verbalized understanding.  "

## 2017-09-08 ENCOUNTER — TELEPHONE (OUTPATIENT)
Dept: GASTROENTEROLOGY | Facility: CLINIC | Age: 67
End: 2017-09-08

## 2017-09-08 NOTE — TELEPHONE ENCOUNTER
----- Message from Viky Brooks sent at 9/8/2017 11:51 AM CDT -----  Contact: Self [phone]  Calling to talk to nurse concerning the time for her procedure. Please advice

## 2017-09-08 NOTE — TELEPHONE ENCOUNTER
Informed pt that she will receive a call from Endo with her time. Endo department number has been given to pt. Verbal Understanding.

## 2017-09-11 ENCOUNTER — TELEPHONE (OUTPATIENT)
Dept: GASTROENTEROLOGY | Facility: CLINIC | Age: 67
End: 2017-09-11

## 2017-09-11 NOTE — TELEPHONE ENCOUNTER
Spoke with patient to inform her of instructions patient was informed not to have anything to eat or drink after 12am. Patient verbalized understanding.    ----- Message from Aviva Gastelum sent at 9/11/2017  8:19 AM CDT -----  Contact: [phone]  Patient is requesting to speak with you, patient states it urgent

## 2017-09-12 ENCOUNTER — TELEPHONE (OUTPATIENT)
Dept: GASTROENTEROLOGY | Facility: CLINIC | Age: 67
End: 2017-09-12

## 2017-09-12 NOTE — TELEPHONE ENCOUNTER
----- Message from Salena Bridges MA sent at 9/12/2017  3:22 PM CDT -----  Contact: self/[phone]      ----- Message -----  From: Conor Ge  Sent: 9/12/2017   2:56 PM  To: Akshat BURDEN Staff    Patient would like to speak to Salena in regard to a reaction she is having from yellow and green Jaymie.    Please call and advise.

## 2017-09-12 NOTE — TELEPHONE ENCOUNTER
----- Message from Conor Ge sent at 9/12/2017  2:56 PM CDT -----  Contact: self/[phone]  Patient would like to speak to Salena in regard to a reaction she is having from yellow and green Jello.    Please call and advise.

## 2017-09-12 NOTE — TELEPHONE ENCOUNTER
Reports itching after eating lime jello.  Has taken atarax with some improvement.     Will monitor and present if worsens or returns.

## 2017-09-13 ENCOUNTER — NURSE TRIAGE (OUTPATIENT)
Dept: ADMINISTRATIVE | Facility: CLINIC | Age: 67
End: 2017-09-13

## 2017-09-13 ENCOUNTER — SURGERY (OUTPATIENT)
Age: 67
End: 2017-09-13

## 2017-09-13 ENCOUNTER — ANESTHESIA (OUTPATIENT)
Dept: ENDOSCOPY | Facility: HOSPITAL | Age: 67
End: 2017-09-13
Payer: COMMERCIAL

## 2017-09-13 ENCOUNTER — ANESTHESIA EVENT (OUTPATIENT)
Dept: ENDOSCOPY | Facility: HOSPITAL | Age: 67
End: 2017-09-13
Payer: COMMERCIAL

## 2017-09-13 ENCOUNTER — HOSPITAL ENCOUNTER (OUTPATIENT)
Facility: HOSPITAL | Age: 67
Discharge: HOME | End: 2017-09-13
Attending: INTERNAL MEDICINE | Admitting: INTERNAL MEDICINE
Payer: COMMERCIAL

## 2017-09-13 VITALS
BODY MASS INDEX: 50.14 KG/M2 | SYSTOLIC BLOOD PRESSURE: 130 MMHG | DIASTOLIC BLOOD PRESSURE: 68 MMHG | WEIGHT: 283 LBS | HEART RATE: 63 BPM | RESPIRATION RATE: 16 BRPM | TEMPERATURE: 98 F | HEIGHT: 63 IN | OXYGEN SATURATION: 98 %

## 2017-09-13 DIAGNOSIS — K21.9 GERD (GASTROESOPHAGEAL REFLUX DISEASE): ICD-10-CM

## 2017-09-13 DIAGNOSIS — K21.9 GASTROESOPHAGEAL REFLUX DISEASE, ESOPHAGITIS PRESENCE NOT SPECIFIED: Primary | ICD-10-CM

## 2017-09-13 PROCEDURE — 45381 COLONOSCOPY SUBMUCOUS NJX: CPT | Mod: 51,,, | Performed by: INTERNAL MEDICINE

## 2017-09-13 PROCEDURE — 25000003 PHARM REV CODE 250: Performed by: NURSE ANESTHETIST, CERTIFIED REGISTERED

## 2017-09-13 PROCEDURE — 45385 COLONOSCOPY W/LESION REMOVAL: CPT | Performed by: INTERNAL MEDICINE

## 2017-09-13 PROCEDURE — 27201028 HC NEEDLE, SCLERO: Performed by: INTERNAL MEDICINE

## 2017-09-13 PROCEDURE — 37000008 HC ANESTHESIA 1ST 15 MINUTES: Performed by: INTERNAL MEDICINE

## 2017-09-13 PROCEDURE — 63600175 PHARM REV CODE 636 W HCPCS: Performed by: NURSE ANESTHETIST, CERTIFIED REGISTERED

## 2017-09-13 PROCEDURE — 27201089 HC SNARE, DISP (ANY): Performed by: INTERNAL MEDICINE

## 2017-09-13 PROCEDURE — 43239 EGD BIOPSY SINGLE/MULTIPLE: CPT | Mod: 51,,, | Performed by: INTERNAL MEDICINE

## 2017-09-13 PROCEDURE — 88305 TISSUE EXAM BY PATHOLOGIST: CPT | Mod: 26,,,

## 2017-09-13 PROCEDURE — 45385 COLONOSCOPY W/LESION REMOVAL: CPT | Mod: 33,,, | Performed by: INTERNAL MEDICINE

## 2017-09-13 PROCEDURE — 88305 TISSUE EXAM BY PATHOLOGIST: CPT

## 2017-09-13 PROCEDURE — 37000009 HC ANESTHESIA EA ADD 15 MINS: Performed by: INTERNAL MEDICINE

## 2017-09-13 PROCEDURE — 25000003 PHARM REV CODE 250: Performed by: INTERNAL MEDICINE

## 2017-09-13 RX ORDER — LIDOCAINE HCL/PF 100 MG/5ML
SYRINGE (ML) INTRAVENOUS
Status: DISCONTINUED | OUTPATIENT
Start: 2017-09-13 | End: 2017-09-13

## 2017-09-13 RX ORDER — KETAMINE HYDROCHLORIDE 50 MG/ML
INJECTION, SOLUTION INTRAMUSCULAR; INTRAVENOUS
Status: DISCONTINUED | OUTPATIENT
Start: 2017-09-13 | End: 2017-09-13

## 2017-09-13 RX ORDER — MIDAZOLAM HYDROCHLORIDE 1 MG/ML
INJECTION, SOLUTION INTRAMUSCULAR; INTRAVENOUS
Status: DISCONTINUED | OUTPATIENT
Start: 2017-09-13 | End: 2017-09-13

## 2017-09-13 RX ORDER — SODIUM CHLORIDE 9 MG/ML
INJECTION, SOLUTION INTRAVENOUS CONTINUOUS
Status: DISCONTINUED | OUTPATIENT
Start: 2017-09-13 | End: 2017-09-13 | Stop reason: HOSPADM

## 2017-09-13 RX ORDER — PROPOFOL 10 MG/ML
VIAL (ML) INTRAVENOUS CONTINUOUS PRN
Status: DISCONTINUED | OUTPATIENT
Start: 2017-09-13 | End: 2017-09-13

## 2017-09-13 RX ORDER — PROPOFOL 10 MG/ML
VIAL (ML) INTRAVENOUS
Status: DISCONTINUED | OUTPATIENT
Start: 2017-09-13 | End: 2017-09-13

## 2017-09-13 RX ORDER — GLYCOPYRROLATE 0.2 MG/ML
INJECTION INTRAMUSCULAR; INTRAVENOUS
Status: DISCONTINUED | OUTPATIENT
Start: 2017-09-13 | End: 2017-09-13

## 2017-09-13 RX ADMIN — PROPOFOL 150 MCG/KG/MIN: 10 INJECTION, EMULSION INTRAVENOUS at 12:09

## 2017-09-13 RX ADMIN — GLYCOPYRROLATE 0.2 MG: 0.2 INJECTION, SOLUTION INTRAMUSCULAR; INTRAVENOUS at 12:09

## 2017-09-13 RX ADMIN — SODIUM CHLORIDE: 0.9 INJECTION, SOLUTION INTRAVENOUS at 11:09

## 2017-09-13 RX ADMIN — TOPICAL ANESTHETIC 1 EACH: 200 SPRAY DENTAL; PERIODONTAL at 12:09

## 2017-09-13 RX ADMIN — MIDAZOLAM 2 MG: 1 INJECTION INTRAMUSCULAR; INTRAVENOUS at 12:09

## 2017-09-13 RX ADMIN — KETAMINE HYDROCHLORIDE 30 MG: 50 INJECTION, SOLUTION, CONCENTRATE INTRAMUSCULAR; INTRAVENOUS at 12:09

## 2017-09-13 RX ADMIN — PROPOFOL 80 MG: 10 INJECTION, EMULSION INTRAVENOUS at 12:09

## 2017-09-13 RX ADMIN — LIDOCAINE HYDROCHLORIDE 80 ML: 20 INJECTION, SOLUTION INTRAVENOUS at 12:09

## 2017-09-13 NOTE — ANESTHESIA POSTPROCEDURE EVALUATION
"Anesthesia Post Evaluation    Patient: Ni Montejo    Procedure(s) Performed: Procedure(s) (LRB):  ESOPHAGOGASTRODUODENOSCOPY (EGD) (N/A)  COLONOSCOPY (N/A)    Final Anesthesia Type: MAC  Patient location during evaluation: GI PACU  Patient participation: Yes- Able to Participate  Level of consciousness: awake and alert  Post-procedure vital signs: reviewed and stable  Pain management: adequate  Airway patency: patent  PONV status at discharge: No PONV  Anesthetic complications: no      Cardiovascular status: blood pressure returned to baseline and hemodynamically stable  Respiratory status: unassisted, spontaneous ventilation and room air  Hydration status: euvolemic  Follow-up not needed.        Visit Vitals  BP (!) 92/58   Pulse 69   Temp 36.6 °C (97.9 °F)   Resp 18   Ht 5' 2.5" (1.588 m)   Wt 128.4 kg (283 lb)   SpO2 100%   Breastfeeding? No   BMI 50.94 kg/m²       Pain/Oscar Score: Pain Assessment Performed: Yes (9/13/2017  1:00 PM)  Presence of Pain: denies (9/13/2017  1:00 PM)  Oscar Score: 10 (9/13/2017  1:00 PM)      "

## 2017-09-13 NOTE — DISCHARGE INSTRUCTIONS
Discharge Summary/Instructions for EGD and Colonoscopy  Ni Montejo  9/13/2017  Pauline Oden MD    Restrictions on Activity:    - Do not drive car or operate machinery, make critical decisions, or do activities that   require coordination or balance for 24 hours.  - The following day: return to full activity including work.  - For 3 days: No heavy lifting, straining or running.  - Diet: Eat and drink normally unless instructed otherwise.    Treatment for Common Side Effects:  - Mild abdominal pain and bloating or excessive gas: rest, eat lightly and use a heating pad.   -Sore Throat - treat with throat lozenges, gargle with warm salt water.    Symptoms to watch for and report to your physician:  1. Severe abdominal pain.  2. Fever within 24 hours after a procedure.  3. A large amount of rectal bleeding. (A small amount of blood from the rectum is not serious, especially if hemorrhoids are present.)  4. Because air was put into your colon during the procedure, expelling large amount of air from your rectum is normal.  5. You may not have a bowel movement for 1-3 days because of the colonoscopy prep. This is normal.  6. Go directly to the emergency room if you notice any of the following:     Chills and/orfever over 101   Persistent vomiting   Severe abdominal pain, other than gas cramps   Severe chest pain   Black, tarry stools   Any bleeding - exceeding one tablespoon    If you have any questions or problems, please call your Physician:    Pauline Oden MD    Lab Results: Contact Physician's Office    If a complication or emergency situation arises and you are unable to reach your Physician - GO TO THE EMERGENCY ROOM.

## 2017-09-13 NOTE — TRANSFER OF CARE
"Anesthesia Transfer of Care Note    Patient: Ni Montejo    Procedure(s) Performed: Procedure(s) (LRB):  ESOPHAGOGASTRODUODENOSCOPY (EGD) (N/A)  COLONOSCOPY (N/A)    Patient location: GI    Anesthesia Type: MAC    Transport from OR: Transported from OR on room air with adequate spontaneous ventilation. Transported from OR on 2-3 L/min O2 by NC with adequate spontaneous ventilation    Post pain: adequate analgesia    Post assessment: no apparent anesthetic complications and tolerated procedure well    Post vital signs: stable    Level of consciousness: awake, alert and oriented    Nausea/Vomiting: no nausea/vomiting    Complications: none    Transfer of care protocol was followed      Last vitals:   Visit Vitals  BP (!) 92/58   Pulse 69   Temp 36.6 °C (97.9 °F)   Resp 18   Ht 5' 2.5" (1.588 m)   Wt 128.4 kg (283 lb)   SpO2 100%   Breastfeeding? No   BMI 50.94 kg/m²     "

## 2017-09-13 NOTE — H&P
Chief Complaint: Gastroesophageal Reflux     Gastroesophageal Reflux   She complains of belching and heartburn. She reports no chest pain, no sore throat or no wheezing. This is a chronic problem. The problem occurs frequently. The problem has been gradually worsening. The heartburn is located in the substernum. The heartburn wakes her from sleep. The heartburn limits her activity. The heartburn changes with position. The symptoms are aggravated by tight clothes (hunger). Risk factors include obesity and lack of exercise. She has tried a diet change for the symptoms.     Patient requesting colonoscopy.  Patient denies any abdominal pain, weight loss or blood in her stool.  There are multiple family members with history of colon cancer.     Review of Systems   Constitutional: Negative for appetite change and fever.   HENT: Negative for sore throat and trouble swallowing.    Eyes: Negative for photophobia and visual disturbance.   Respiratory: Negative for wheezing.    Cardiovascular: Negative for chest pain and palpitations.   Gastrointestinal: Positive for heartburn.        See HPI for details   Musculoskeletal: Negative for arthralgias, joint swelling and neck pain.   Skin: Negative for rash and wound.   Neurological: Negative for dizziness, tremors and weakness.   Psychiatric/Behavioral: Negative for behavioral problems and suicidal ideas.       Objective:      Vitals:    09/13/17 1058   BP: (!) 143/64   Pulse: 61   Resp: 20   Temp: 97.9 °F (36.6 °C)        Physical Exam   Constitutional: She is oriented to person, place, and time. She appears well-nourished.   HENT:   Mouth/Throat: Oropharynx is clear and moist.   Eyes: Pupils are equal, round, and reactive to light.   Neck: Neck supple.   Cardiovascular: Normal heart sounds.    Pulmonary/Chest: Effort normal and breath sounds normal.   Abdominal: Soft. She exhibits no mass. There is no tenderness. There is no guarding.   Musculoskeletal: Normal range of motion.    Lymphadenopathy:     She has no cervical adenopathy.   Neurological: She is alert and oriented to person, place, and time.   Skin: Skin is warm. No rash noted.   Psychiatric: She has a normal mood and affect.       Assessment:       1. Gastroesophageal reflux disease without esophagitis    2. Family history of colon cancer        Plan:       EGD  Colonoscopy

## 2017-09-13 NOTE — ANESTHESIA PREPROCEDURE EVALUATION
09/13/2017  Ni Montejo is a 66 y.o., female for EGD and colonoscopy under MAC. Pt is morbidly obese with sleep apnea.     Anesthesia Evaluation     I have reviewed the Nursing Notes.   I have reviewed the Medications.     Review of Systems  Social:  Non-Smoker, No Alcohol Use   Cardiovascular:   Hypertension    Pulmonary:   Sleep Apnea Pulm HTN PAS 37   Hepatic/GI:   GERD    Neurological:   Cognitive defect/mental changes       Physical Exam  General:  Morbid Obesity       Chest/Lungs:  Chest/Lungs Clear    Heart/Vascular:  Heart Findings: Normal          CONCLUSIONS ECHO    1 - Normal left ventricular systolic function (EF 60-65%).     2 - Normal right ventricular systolic function .     3 - Indeterminate LV diastolic function.     4 - Mild left atrial enlargement.     5 - The estimated PA systolic pressure is 37 mmHg.       This document has been electronically    SIGNED BY: Tee Hinton MD On: 07/28/2017 17:11      Anesthesia Plan  Type of Anesthesia, risks & benefits discussed:  Anesthesia Type:  MAC  Patient's Preference:   Intra-op Monitoring Plan:   Intra-op Monitoring Plan Comments:   Post Op Pain Control Plan:   Post Op Pain Control Plan Comments:   Induction:    Beta Blocker:  Patient is not currently on a Beta-Blocker (No further documentation required).       Informed Consent: Patient understands risks and agrees with Anesthesia plan.  Questions answered. Anesthesia consent signed with patient.  ASA Score: 3     Day of Surgery Review of History & Physical:            Ready For Surgery From Anesthesia Perspective.

## 2017-09-13 NOTE — TELEPHONE ENCOUNTER
No call back from on call provider. Pt called and advised to continue prep as ordered.  Verbalized understanding.

## 2017-09-13 NOTE — TELEPHONE ENCOUNTER
Due for upper and lower GI today. Had to drink jug prep. Drank 1/2 as told and has been on a liquid diet. Only had 4 cups of jello. Has been passing clear water since yesterday and is wanting to know if she can refrain from drinking the rest of the prep.    Reason for Disposition   Caller has URGENT medication question about med that PCP prescribed and triager unable to answer question    Protocols used: ST MEDICATION QUESTION CALL-A-AH

## 2017-09-14 ENCOUNTER — TELEPHONE (OUTPATIENT)
Dept: SLEEP MEDICINE | Facility: CLINIC | Age: 67
End: 2017-09-14

## 2017-09-14 NOTE — TELEPHONE ENCOUNTER
----- Message from Daniel Baez sent at 9/14/2017  9:52 AM CDT -----  Contact: Patient   x_  1st Request  _  2nd Request  _  3rd Request        Who: GIOVANNA LUNDBERG [[id number]]    Why: Requesting a call back in regards to rescheduling sleep study. Please call at earliest convenience to discuss further.     Thanks!     What Number to Call Back:[phone]    When to Expect a call back: (With in 24 hours)

## 2017-09-18 ENCOUNTER — TELEPHONE (OUTPATIENT)
Dept: GASTROENTEROLOGY | Facility: CLINIC | Age: 67
End: 2017-09-18

## 2017-09-18 NOTE — TELEPHONE ENCOUNTER
Left message for patient to return call to office.    ----- Message from Pauline Oden MD sent at 9/15/2017  2:35 PM CDT -----  Pathology report is benign

## 2017-09-18 NOTE — TELEPHONE ENCOUNTER
Left message for patient to return call to office.    ----- Message from Marisa Linton sent at 9/18/2017  9:15 AM CDT -----  Contact: [phone]/self  Patient called in returning your call. Please advise.

## 2017-09-18 NOTE — TELEPHONE ENCOUNTER
Spoke with patient to inform her of results. Patient verbalized understanding.    ----- Message from Pauline Oden MD sent at 9/15/2017  2:35 PM CDT -----  Pathology report is benign

## 2017-09-19 ENCOUNTER — TELEPHONE (OUTPATIENT)
Dept: GASTROENTEROLOGY | Facility: CLINIC | Age: 67
End: 2017-09-19

## 2017-09-19 NOTE — TELEPHONE ENCOUNTER
Spoke with patient she states that she is having some pain in between her legs. Patient is concerned because she was told not to lift anything after her procedure and she did. Patient would also like to discuss procedure notes. Advised her that a message will be sent to NP to have to give her a call. Patient verbalized understanding.    ----- Message from Seble Guzman sent at 9/19/2017  1:30 PM CDT -----  Contact: [phone] / self   Patient is requesting to discuss paperwork that was given to her. Please advise.

## 2017-09-19 NOTE — TELEPHONE ENCOUNTER
"Reviewed in detail findings from procedure and all notes.  She is reporting a full sensation as "if something dropped down" in the vaginal area.  No rectal complaints.  No bowel changes or rectal bleeding.  If complaints persist, should see her gyn or PCP.  She verbalized understanding.   "

## 2017-09-22 ENCOUNTER — TELEPHONE (OUTPATIENT)
Dept: PODIATRY | Facility: CLINIC | Age: 67
End: 2017-09-22

## 2017-09-22 DIAGNOSIS — B35.3 TINEA PEDIS OF BOTH FEET: Primary | ICD-10-CM

## 2017-09-22 NOTE — TELEPHONE ENCOUNTER
----- Message from Chanell Mcghee sent at 9/22/2017 10:52 AM CDT -----  Contact: self@home, [phone]  Pt called in stating that the ketoconazole (NIZORAL) 2 % cream is not working and she is asking to please send an Rx for the original, more expensive cream to the [facility] pharmacy. She asked to please call her when sent     Thank you        Dr. Cherry I call and talk to the patient and patient said that the ketoconazole cream is not working and she would like for you to call in the first cream you suggested for her the name might be econazole nitrate 1% please advise

## 2017-09-25 RX ORDER — ECONAZOLE NITRATE 10 MG/G
CREAM TOPICAL 2 TIMES DAILY
Qty: 30 G | Refills: 6 | Status: SHIPPED | OUTPATIENT
Start: 2017-09-25 | End: 2020-08-06

## 2017-10-11 ENCOUNTER — TELEPHONE (OUTPATIENT)
Dept: SLEEP MEDICINE | Facility: OTHER | Age: 67
End: 2017-10-11

## 2017-10-23 ENCOUNTER — TELEPHONE (OUTPATIENT)
Dept: SLEEP MEDICINE | Facility: OTHER | Age: 67
End: 2017-10-23

## 2017-11-07 RX ORDER — HYDROXYZINE HYDROCHLORIDE 25 MG/1
25 TABLET, FILM COATED ORAL 4 TIMES DAILY
Qty: 60 TABLET | Refills: 3 | Status: SHIPPED | OUTPATIENT
Start: 2017-11-07 | End: 2018-02-08 | Stop reason: SDUPTHER

## 2017-11-29 ENCOUNTER — TELEPHONE (OUTPATIENT)
Dept: GASTROENTEROLOGY | Facility: CLINIC | Age: 67
End: 2017-11-29

## 2017-11-29 RX ORDER — OMEPRAZOLE 40 MG/1
40 CAPSULE, DELAYED RELEASE ORAL DAILY
Qty: 30 CAPSULE | Refills: 6 | Status: SHIPPED | OUTPATIENT
Start: 2017-11-29 | End: 2018-05-14 | Stop reason: SDUPTHER

## 2017-11-29 NOTE — TELEPHONE ENCOUNTER
Spoke with patient to inform her that request was sent for refill medication. Pharmacy was changed. Verbalized understanding.    ----- Message from Chatnell Valentine sent at 11/29/2017  7:14 AM CST -----  No. [phone]    Patient needs script for Omeprazole 40mg.    [email or web address]  No. [phone]

## 2017-11-29 NOTE — TELEPHONE ENCOUNTER
----- Message from Salena Bridges MA sent at 11/29/2017  7:52 AM CST -----      ----- Message -----  From: Chantell Valentine  Sent: 11/29/2017   7:14 AM  To: Akshat BURDEN Staff    No. [phone]    Patient needs script for Omeprazole 40mg.    [facility]  No. [phone]

## 2017-11-30 ENCOUNTER — TELEPHONE (OUTPATIENT)
Dept: NEUROLOGY | Facility: CLINIC | Age: 67
End: 2017-11-30

## 2017-11-30 NOTE — TELEPHONE ENCOUNTER
----- Message from Nicole Almazan sent at 11/30/2017  9:35 AM CST -----  Contact: Patient herself  X  1st Request  _  2nd Request  _  3rd Request    Who: Ni Swanson (mrn# [id number])    Why: Patient called requesting copies of her medical records that was faxed over from Dr. Harrison [facility]'s office. Please give a call back at your earliest convenience.       THANKS!     What Number to Call Back: [phone]    When to Expect a call back: (Before the end of the day)   -- if the call is after 12:00, the call back will be tomorrow.

## 2017-12-13 ENCOUNTER — OFFICE VISIT (OUTPATIENT)
Dept: INTERNAL MEDICINE | Facility: CLINIC | Age: 67
End: 2017-12-13
Payer: MEDICARE

## 2017-12-13 VITALS
HEART RATE: 68 BPM | SYSTOLIC BLOOD PRESSURE: 127 MMHG | BODY MASS INDEX: 50.23 KG/M2 | HEIGHT: 63 IN | DIASTOLIC BLOOD PRESSURE: 78 MMHG | RESPIRATION RATE: 24 BRPM | WEIGHT: 283.5 LBS | TEMPERATURE: 98 F

## 2017-12-13 DIAGNOSIS — J01.90 ACUTE SINUSITIS, RECURRENCE NOT SPECIFIED, UNSPECIFIED LOCATION: Primary | ICD-10-CM

## 2017-12-13 DIAGNOSIS — I10 HYPERTENSION, UNSPECIFIED TYPE: ICD-10-CM

## 2017-12-13 DIAGNOSIS — Z12.31 VISIT FOR SCREENING MAMMOGRAM: ICD-10-CM

## 2017-12-13 PROCEDURE — 99204 OFFICE O/P NEW MOD 45 MIN: CPT | Mod: 25,S$GLB,, | Performed by: INTERNAL MEDICINE

## 2017-12-13 PROCEDURE — 99999 PR PBB SHADOW E&M-EST. PATIENT-LVL III: CPT | Mod: PBBFAC,,, | Performed by: INTERNAL MEDICINE

## 2017-12-13 PROCEDURE — 96372 THER/PROPH/DIAG INJ SC/IM: CPT | Mod: S$GLB,,, | Performed by: INTERNAL MEDICINE

## 2017-12-13 PROCEDURE — 99499 UNLISTED E&M SERVICE: CPT | Mod: S$PBB,,, | Performed by: INTERNAL MEDICINE

## 2017-12-13 RX ORDER — LISINOPRIL AND HYDROCHLOROTHIAZIDE 12.5; 2 MG/1; MG/1
1 TABLET ORAL 2 TIMES DAILY
Qty: 180 TABLET | Refills: 1 | Status: SHIPPED | OUTPATIENT
Start: 2017-12-13 | End: 2020-07-16 | Stop reason: SDUPTHER

## 2017-12-13 RX ORDER — LISINOPRIL AND HYDROCHLOROTHIAZIDE 12.5; 2 MG/1; MG/1
1 TABLET ORAL 2 TIMES DAILY
COMMUNITY
End: 2017-12-13 | Stop reason: SDUPTHER

## 2017-12-13 RX ORDER — DOXYCYCLINE HYCLATE 100 MG
100 TABLET ORAL 2 TIMES DAILY
Qty: 14 TABLET | Refills: 0 | Status: SHIPPED | OUTPATIENT
Start: 2017-12-13 | End: 2017-12-20

## 2017-12-13 RX ORDER — TRIAMCINOLONE ACETONIDE 40 MG/ML
40 INJECTION, SUSPENSION INTRA-ARTICULAR; INTRAMUSCULAR
Status: COMPLETED | OUTPATIENT
Start: 2017-12-13 | End: 2017-12-13

## 2017-12-13 RX ORDER — AZELASTINE 1 MG/ML
1 SPRAY, METERED NASAL 2 TIMES DAILY
Qty: 30 ML | Refills: 11 | Status: SHIPPED | OUTPATIENT
Start: 2017-12-13 | End: 2020-08-06 | Stop reason: ALTCHOICE

## 2017-12-13 RX ADMIN — TRIAMCINOLONE ACETONIDE 40 MG: 40 INJECTION, SUSPENSION INTRA-ARTICULAR; INTRAMUSCULAR at 01:12

## 2017-12-13 NOTE — PROGRESS NOTES
Subjective:       Patient ID: Ni Swanson is a 67 y.o. female.    Chief Complaint: Sinus Problem and Nasal Congestion (sneezing)    Patient is a 67 y.o.female who presents today for sneezing and congestion.    Taking art GLA 2-3 times per week for arthritis.       Three days or so of nasal congestion, runny eyes and runny nose with clear fluid. She is having to blow her nose constantly. She had an itchy throat as well. She is sneezing often as well. It is causing her to be incontinent. No fever or chills.    She notes some breast tenderness and would like a mammogram done prior to the end of the year.      Review of Systems   Constitutional: Negative for appetite change, chills, diaphoresis, fatigue and fever.   HENT: Positive for congestion, postnasal drip and sinus pressure. Negative for dental problem, ear discharge, ear pain, hearing loss and sore throat.    Eyes: Negative for discharge, redness and itching.   Respiratory: Negative for cough, chest tightness, shortness of breath and wheezing.    Cardiovascular: Negative for chest pain, palpitations and leg swelling.   Gastrointestinal: Negative for abdominal pain, constipation, diarrhea, nausea and vomiting.   Endocrine: Negative for cold intolerance and heat intolerance.   Genitourinary: Negative for difficulty urinating, frequency, hematuria and urgency.   Musculoskeletal: Negative for arthralgias, back pain, gait problem, myalgias and neck pain.   Skin: Negative for color change and rash.   Neurological: Negative for dizziness, syncope and headaches.   Hematological: Negative for adenopathy.   Psychiatric/Behavioral: Negative for behavioral problems and sleep disturbance. The patient is not nervous/anxious.        Objective:      Physical Exam   Constitutional: She is oriented to person, place, and time. She appears well-developed and well-nourished. No distress.   HENT:   Head: Normocephalic and atraumatic.   Right Ear: Tympanic membrane and external ear  normal.   Left Ear: Tympanic membrane and external ear normal.   Nose: Mucosal edema and rhinorrhea present.   Mouth/Throat: Uvula is midline and mucous membranes are normal. No oropharyngeal exudate, posterior oropharyngeal edema, posterior oropharyngeal erythema or tonsillar abscesses.   Eyes: Conjunctivae and EOM are normal. Pupils are equal, round, and reactive to light. Right eye exhibits no discharge. Left eye exhibits no discharge. No scleral icterus.   Neck: Normal range of motion. Neck supple. No JVD present. No thyromegaly present.   Cardiovascular: Normal rate, regular rhythm, normal heart sounds and intact distal pulses.  Exam reveals no gallop and no friction rub.    No murmur heard.  Pulmonary/Chest: Effort normal and breath sounds normal. No stridor. No respiratory distress. She has no wheezes. She has no rales. She exhibits no tenderness.   Abdominal: Soft. Bowel sounds are normal. She exhibits no distension. There is no tenderness. There is no rebound.   Musculoskeletal: Normal range of motion. She exhibits no edema or tenderness.   Lymphadenopathy:     She has no cervical adenopathy.   Neurological: She is alert and oriented to person, place, and time. No cranial nerve deficit.   Skin: Skin is warm and dry. No rash noted. She is not diaphoretic. No erythema.   Psychiatric: She has a normal mood and affect. Her behavior is normal.   Nursing note and vitals reviewed.      Assessment and Plan:       1. Acute sinusitis, recurrence not specified, unspecified location  - triamcinolone acetonide injection 40 mg; Inject 1 mL (40 mg total) into the muscle one time.  - doxycycline (VIBRA-TABS) 100 MG tablet; Take 1 tablet (100 mg total) by mouth 2 (two) times daily.  Dispense: 14 tablet; Refill: 0  - azelastine (ASTELIN) 137 mcg (0.1 %) nasal spray; 1 spray (137 mcg total) by Nasal route 2 (two) times daily.  Dispense: 30 mL; Refill: 11    2. Hypertension, unspecified type  - stable on prinzide; med  refilled    3. Visit for screening mammogram  - Mammo Digital Screening Bilat with CAD; Future          No Follow-up on file.

## 2017-12-14 ENCOUNTER — TELEPHONE (OUTPATIENT)
Dept: SLEEP MEDICINE | Facility: OTHER | Age: 67
End: 2017-12-14

## 2017-12-15 ENCOUNTER — HOSPITAL ENCOUNTER (OUTPATIENT)
Dept: RADIOLOGY | Facility: HOSPITAL | Age: 67
Discharge: HOME | End: 2017-12-15
Attending: INTERNAL MEDICINE
Payer: MEDICARE

## 2017-12-15 DIAGNOSIS — Z12.31 VISIT FOR SCREENING MAMMOGRAM: ICD-10-CM

## 2017-12-15 PROCEDURE — 77067 SCR MAMMO BI INCL CAD: CPT | Mod: TC

## 2017-12-15 PROCEDURE — 77067 SCR MAMMO BI INCL CAD: CPT | Mod: 26,,, | Performed by: RADIOLOGY

## 2017-12-15 PROCEDURE — 77063 BREAST TOMOSYNTHESIS BI: CPT | Mod: 26,,, | Performed by: RADIOLOGY

## 2017-12-20 ENCOUNTER — TELEPHONE (OUTPATIENT)
Dept: INTERNAL MEDICINE | Facility: CLINIC | Age: 67
End: 2017-12-20

## 2017-12-20 NOTE — TELEPHONE ENCOUNTER
----- Message from Ana Paula Hung sent at 12/20/2017  8:36 AM CST -----  Contact: self/[phone]      Name of test:MAMMO SCREENING    Date of test: 12/15/17    Ordering provider: Dr Chand    Where was the test performed: [facility]    Comments: Patient called in regards needing to get her results. Patient stated that 5 days should be enough to get results. Please call and advise.       Thank you!!!

## 2017-12-29 ENCOUNTER — OFFICE VISIT (OUTPATIENT)
Dept: PODIATRY | Facility: CLINIC | Age: 67
End: 2017-12-29
Payer: MEDICARE

## 2017-12-29 VITALS
HEART RATE: 59 BPM | HEIGHT: 63 IN | RESPIRATION RATE: 18 BRPM | BODY MASS INDEX: 50.14 KG/M2 | SYSTOLIC BLOOD PRESSURE: 125 MMHG | DIASTOLIC BLOOD PRESSURE: 76 MMHG | WEIGHT: 283 LBS

## 2017-12-29 DIAGNOSIS — B35.1 ONYCHOMYCOSIS DUE TO DERMATOPHYTE: Primary | ICD-10-CM

## 2017-12-29 DIAGNOSIS — Z98.1 H/O ANKLE FUSION: ICD-10-CM

## 2017-12-29 PROCEDURE — 99999 PR PBB SHADOW E&M-EST. PATIENT-LVL III: CPT | Mod: PBBFAC,,, | Performed by: PODIATRIST

## 2017-12-29 PROCEDURE — 99214 OFFICE O/P EST MOD 30 MIN: CPT | Mod: S$GLB,,, | Performed by: PODIATRIST

## 2017-12-29 NOTE — PROGRESS NOTES
"Subjective:      Patient ID: Ni Montejo is a 67 y.o. female.    Chief Complaint: PCP (Ilana Joseph,  12/13/17); Follow-up (nail fungus  ); and Nail Problem    Ni is a 67 y.o. female who presents to the clinic complaining of thick and discolored toenails and dry, thick skin on soles on both feet. This has worsened over the last 20 years. States she has been applying previously prescribed topical antifungal and has noticed minimal improvement  Ni is inquiring about treatment options.    She also requests prescription for rocker bottom shoe.  Right leg is at least 1/4" shorter 2/2 fracture and then fusion which occurred in the mid 80's. Denies ankle pain. Wears [employer] shoes.  She denies any history of lower extremity wounds, infections and/or injury.      Review of Systems   Constitution: Negative for chills, fever, weakness, malaise/fatigue and night sweats.   Cardiovascular: Positive for leg swelling. Negative for chest pain, orthopnea and palpitations.   Respiratory: Negative for cough, shortness of breath and wheezing.    Skin: Positive for color change, dry skin, itching and nail changes. Negative for poor wound healing and rash.   Musculoskeletal: Negative for arthritis, gout, joint pain, joint swelling, muscle weakness and myalgias.   Gastrointestinal: Negative for abdominal pain, constipation and nausea.   Neurological: Negative for disturbances in coordination, dizziness, focal weakness, numbness and tremors.           Objective:      Physical Exam   Constitutional: She is oriented to person, place, and time. Vital signs are normal. She appears well-developed. She is cooperative. No distress.   Cardiovascular: Intact distal pulses.    Pulses:       Dorsalis pedis pulses are 2+ on the right side, and 2+ on the left side.        Posterior tibial pulses are 2+ on the right side, and 2+ on the left side.   Musculoskeletal:        Right ankle: She exhibits decreased range of motion and swelling.        " "Left ankle: Normal.        Right foot: There is normal range of motion, no bony tenderness, normal capillary refill, no crepitus and no deformity.        Left foot: There is normal range of motion, no bony tenderness, normal capillary refill, no crepitus and no deformity.   Moderate edema to right lower leg and ankle. No pain with ankle ROM, manipulation. Minimal ROM available.   Right leg at least 1/4" shorter than left.        Neurological: She is alert and oriented to person, place, and time. She has normal strength. No sensory deficit. She exhibits normal muscle tone. Gait normal.   Reflex Scores:       Achilles reflexes are 2+ on the right side and 2+ on the left side.  Negative Tinels sign and Boris's click, bilat.   Skin: Skin is intact. No ecchymosis and no lesion noted. No erythema. Nails show no clubbing.   Toenails 1-5 bilaterally are elongated, thickened by 2-3 mm, discolored/yellowed, dystrophic, brittle with subungual debris.    Plantar xerosis and scaling, bilateral.                Assessment:       Encounter Diagnoses   Name Primary?    Onychomycosis due to dermatophyte Yes    H/O ankle fusion          Plan:       Ni was seen today for pcp, follow-up and nail problem.    Diagnoses and all orders for this visit:    Onychomycosis due to dermatophyte    H/O ankle fusion  -     ORTHOTIC DEVICE (DME)    Other orders  -     efinaconazole (JUBLIA) 10 % Amy; Apply 1 application topically once daily.      I counseled the patient on her conditions, their implications and medical management.    Discussed all treatment options such as topical, oral, laser treatments vs surgical removal of nail permanent vs non-permanent in detail pertaining to nail fungus    She will use topical Jublia, prescription sent.     Prescription for shoe lift dispensed.     Side effects of medication(s) were discussed in detail and patient voiced understanding. Patient will call back for any issues or complications.     Sheridan " МАРИНА Webster PGY-3    I  have personally taken the history and examined this patient and agree with the residents note as stated .    45 minutes of face-to-face spent in direct  counseling and coordination of care

## 2018-01-31 ENCOUNTER — TELEPHONE (OUTPATIENT)
Dept: PODIATRY | Facility: CLINIC | Age: 68
End: 2018-01-31

## 2018-01-31 NOTE — TELEPHONE ENCOUNTER
----- Message from Hanh Rogers sent at 1/31/2018  2:12 PM CST -----  Contact: Self  Pt is calling to speak with Staff regarding shoes due to a fused ankle.  The company says they need the pt's record of her visit where a determination was made for [employer].    She can be reached at [phone].    Thank you.      I talk to patient and she told me that the company wanted our clinic to sent them her last clinic notes . Notes will be sent out on 01/31/2018 at 2:50

## 2018-02-05 ENCOUNTER — TELEPHONE (OUTPATIENT)
Dept: ORTHOPEDICS | Facility: CLINIC | Age: 68
End: 2018-02-05

## 2018-02-05 NOTE — TELEPHONE ENCOUNTER
----- Message from Jared Neumann sent at 2/5/2018  9:19 AM CST -----  Contact: Self/[phone]  Pt wants to bee seen on the week of the 19th for R ankle pain, but the soonest I could find was 2/26. I also added pt to the wait list and informed the pt that I would send this message.  Pt can be reached at [phone].

## 2018-02-05 NOTE — TELEPHONE ENCOUNTER
Spoke with pt.   Advised that no sooner appointments are available at this time.  Pt verbalized understanding.

## 2018-02-08 RX ORDER — HYDROXYZINE HYDROCHLORIDE 25 MG/1
25 TABLET, FILM COATED ORAL 4 TIMES DAILY
Qty: 60 TABLET | Refills: 3 | Status: SHIPPED | OUTPATIENT
Start: 2018-02-08 | End: 2020-08-06 | Stop reason: SDUPTHER

## 2018-02-26 ENCOUNTER — OFFICE VISIT (OUTPATIENT)
Dept: ORTHOPEDICS | Facility: CLINIC | Age: 68
End: 2018-02-26
Payer: MEDICARE

## 2018-02-26 ENCOUNTER — HOSPITAL ENCOUNTER (OUTPATIENT)
Dept: RADIOLOGY | Facility: HOSPITAL | Age: 68
Discharge: HOME | End: 2018-02-26
Attending: STUDENT IN AN ORGANIZED HEALTH CARE EDUCATION/TRAINING PROGRAM
Payer: MEDICARE

## 2018-02-26 VITALS — BODY MASS INDEX: 51.33 KG/M2 | WEIGHT: 289.69 LBS | HEIGHT: 63 IN

## 2018-02-26 DIAGNOSIS — M25.571 CHRONIC PAIN OF RIGHT ANKLE: Primary | ICD-10-CM

## 2018-02-26 DIAGNOSIS — M21.70 LEG LENGTH DISCREPANCY: ICD-10-CM

## 2018-02-26 DIAGNOSIS — G89.29 CHRONIC PAIN OF RIGHT ANKLE: ICD-10-CM

## 2018-02-26 DIAGNOSIS — G89.29 CHRONIC PAIN OF RIGHT ANKLE: Primary | ICD-10-CM

## 2018-02-26 DIAGNOSIS — M25.571 CHRONIC PAIN OF RIGHT ANKLE: ICD-10-CM

## 2018-02-26 DIAGNOSIS — Z98.1 HISTORY OF ARTHRODESIS: ICD-10-CM

## 2018-02-26 PROCEDURE — 73610 X-RAY EXAM OF ANKLE: CPT | Mod: 26,RT,, | Performed by: RADIOLOGY

## 2018-02-26 PROCEDURE — 3008F BODY MASS INDEX DOCD: CPT | Mod: S$GLB,,, | Performed by: ORTHOPAEDIC SURGERY

## 2018-02-26 PROCEDURE — 99999 PR PBB SHADOW E&M-EST. PATIENT-LVL II: CPT | Mod: PBBFAC,,, | Performed by: ORTHOPAEDIC SURGERY

## 2018-02-26 PROCEDURE — 99203 OFFICE O/P NEW LOW 30 MIN: CPT | Mod: S$GLB,,, | Performed by: ORTHOPAEDIC SURGERY

## 2018-02-26 PROCEDURE — 73610 X-RAY EXAM OF ANKLE: CPT | Mod: TC,RT
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
## 2018-02-28 ENCOUNTER — TELEPHONE (OUTPATIENT)
Dept: ORTHOPEDICS | Facility: CLINIC | Age: 68
End: 2018-02-28

## 2018-02-28 NOTE — TELEPHONE ENCOUNTER
----- Message from Jessica Johnson sent at 2/28/2018 12:01 PM CST -----  Contact: self  Pt would like to know if Dr. Scott sent an order to [facility] for her shoes [phone]

## 2018-02-28 NOTE — TELEPHONE ENCOUNTER
Spoke with pt.  Advised that I spoke with Kyle and they are having trouble with their fax machine.   Will continue to fax until it goes through.   Will mail a copy of pt's last office note to pt's home address as requested

## 2018-03-05 ENCOUNTER — TELEPHONE (OUTPATIENT)
Dept: ORTHOPEDICS | Facility: CLINIC | Age: 68
End: 2018-03-05

## 2018-03-05 NOTE — TELEPHONE ENCOUNTER
Spoke with pt.  Advised that I finally received a confirmation that the fax was received by Kyle.

## 2018-03-05 NOTE — TELEPHONE ENCOUNTER
----- Message from Madison Hernandez sent at 3/5/2018  1:57 PM CST -----  Contact: self  Pt called requesting a call back from Mayra regarding an order that was suppose to be put in for her to [facility] for shoes and a lift. Pt would like an immediate call back and could be reached at [phone]

## 2018-03-22 ENCOUNTER — TELEPHONE (OUTPATIENT)
Dept: ORTHOPEDICS | Facility: CLINIC | Age: 68
End: 2018-03-22

## 2018-03-22 NOTE — TELEPHONE ENCOUNTER
Spoke with pt.  States she is having trouble getting her shoes ordered by [facility] [employer].    Spoke with Madelaine at [employer] who admits to not rushing to request auth for custom shoes for pt as the knows the insurance will deny the claim.   She assures me that she will get the claim submitted next week.    Advised pt to allow another week or she can take her order to another supplier for processing.  Pt will wait another week

## 2018-03-22 NOTE — TELEPHONE ENCOUNTER
----- Message from Jass Hartley sent at 3/22/2018 11:22 AM CDT -----  Contact: self/home   Pt would like to speak with you regarding her rx for shoes.

## 2018-05-07 ENCOUNTER — TELEPHONE (OUTPATIENT)
Dept: GASTROENTEROLOGY | Facility: CLINIC | Age: 68
End: 2018-05-07

## 2018-05-07 NOTE — TELEPHONE ENCOUNTER
Spoke with pt and she would like Omeprazole sent to the pharmacy below.   I was able to call this medication in.   Omeprazole  40mg   90 day supply  Refill-1              Pt also had some questions about taking a Prebiotic. She would like to know if this is ok to take along with Omeprazole.

## 2018-05-07 NOTE — TELEPHONE ENCOUNTER
----- Message from Elenita Madrigal sent at 5/7/2018  8:57 AM CDT -----  Contact: Self. [phone] Leave detailed message  Patient would like a refill for omeprazole (PRILOSEC) 40 MG capsule sent to [facility] mail order. Fax number [phone]. Phone nuumber [phone]. Patient is completely out of medication. Patient would like a call back to discuss something else. Please advise.

## 2018-05-14 NOTE — TELEPHONE ENCOUNTER
----- Message from Marisa Linton sent at 5/14/2018 11:10 AM CDT -----  Contact: [phone]/self  Patient would like a refill of omeprazole (PRILOSEC) 40 MG capsule  sent to [facility] mail orders.  Please advise.

## 2018-05-15 RX ORDER — OMEPRAZOLE 40 MG/1
40 CAPSULE, DELAYED RELEASE ORAL DAILY
Qty: 90 CAPSULE | Refills: 1 | Status: SHIPPED | OUTPATIENT
Start: 2018-05-15 | End: 2018-06-14

## 2018-06-09 RX ORDER — EFINACONAZOLE 100 MG/ML
SOLUTION TOPICAL
Qty: 8 ML | Refills: 4 | Status: SHIPPED | OUTPATIENT
Start: 2018-06-09 | End: 2018-09-18 | Stop reason: SDUPTHER

## 2018-08-07 ENCOUNTER — DOCUMENTATION ONLY (OUTPATIENT)
Dept: ORTHOPEDICS | Facility: CLINIC | Age: 68
End: 2018-08-07

## 2018-09-18 RX ORDER — EFINACONAZOLE 100 MG/ML
SOLUTION TOPICAL
Qty: 8 ML | Refills: 4 | Status: SHIPPED | OUTPATIENT
Start: 2018-09-18 | End: 2020-08-06 | Stop reason: ALTCHOICE

## 2018-09-25 DIAGNOSIS — K21.9 GASTROESOPHAGEAL REFLUX DISEASE, ESOPHAGITIS PRESENCE NOT SPECIFIED: Primary | ICD-10-CM

## 2018-09-25 RX ORDER — OMEPRAZOLE 40 MG/1
40 CAPSULE, DELAYED RELEASE ORAL DAILY
Qty: 30 CAPSULE | Refills: 3 | Status: SHIPPED | OUTPATIENT
Start: 2018-09-25 | End: 2020-12-21 | Stop reason: SDUPTHER

## 2018-11-29 ENCOUNTER — OFFICE VISIT (OUTPATIENT)
Dept: PODIATRY | Facility: CLINIC | Age: 68
End: 2018-11-29
Payer: MEDICARE

## 2018-11-29 VITALS
HEIGHT: 62 IN | DIASTOLIC BLOOD PRESSURE: 68 MMHG | HEART RATE: 60 BPM | WEIGHT: 289 LBS | BODY MASS INDEX: 53.18 KG/M2 | SYSTOLIC BLOOD PRESSURE: 146 MMHG

## 2018-11-29 DIAGNOSIS — L84 CORN OR CALLUS: ICD-10-CM

## 2018-11-29 DIAGNOSIS — G60.9 IDIOPATHIC PERIPHERAL NEUROPATHY: ICD-10-CM

## 2018-11-29 DIAGNOSIS — Z98.1 H/O ANKLE FUSION: ICD-10-CM

## 2018-11-29 DIAGNOSIS — L03.031 ONYCHIA, TOE, RIGHT: ICD-10-CM

## 2018-11-29 DIAGNOSIS — B35.1 ONYCHOMYCOSIS DUE TO DERMATOPHYTE: Primary | ICD-10-CM

## 2018-11-29 DIAGNOSIS — E66.01 MORBID OBESITY: ICD-10-CM

## 2018-11-29 DIAGNOSIS — L60.0 INGROWN NAIL: ICD-10-CM

## 2018-11-29 PROCEDURE — 3077F SYST BP >= 140 MM HG: CPT | Mod: CPTII,S$GLB,,

## 2018-11-29 PROCEDURE — 3078F DIAST BP <80 MM HG: CPT | Mod: CPTII,S$GLB,,

## 2018-11-29 PROCEDURE — 99999 PR PBB SHADOW E&M-EST. PATIENT-LVL III: CPT | Mod: PBBFAC,,,

## 2018-11-29 PROCEDURE — 11721 DEBRIDE NAIL 6 OR MORE: CPT | Mod: 59,Q9,S$GLB,

## 2018-11-29 PROCEDURE — 11057 PARNG/CUTG B9 HYPRKR LES >4: CPT | Mod: Q9,S$GLB,,

## 2018-11-29 PROCEDURE — 99214 OFFICE O/P EST MOD 30 MIN: CPT | Mod: 25,S$GLB,,

## 2018-11-29 PROCEDURE — 1101F PT FALLS ASSESS-DOCD LE1/YR: CPT | Mod: CPTII,S$GLB,,

## 2018-11-29 PROCEDURE — 11730 AVULSION NAIL PLATE SIMPLE 1: CPT | Mod: 51,T5,S$GLB,

## 2018-11-29 NOTE — PROGRESS NOTES
Subjective:       Patient ID: Ni Montejo is a 68 y.o. female.    Chief Complaint: Ingrown Toenail (Right Great Toenail)    HPI  Ni is a 68 y.o. female who presents to the clinic for evaluation and treatment of high risk feet. Ni has a past medical history of Cataract, Hypertension, Memory changes, and Palpitations. The patient's chief complaint is long, thick toenails, calluses, ingrown nail right lateral hallux and burnning numbness to her feet.  She has had the callus and nail trimmed down in the past.      PCP: Harrison Heart MD    Date Last Seen by PCP: Dr. Heart 11/3/18    Current shoe gear:  Affected Foot: Extra depth shoes     Unaffected Foot: Extra depth shoes    Last encounter in this department: Visit date not found    No results found for: HGBA1C    Review of Systems  ROS:  Constitution: Negative for chills, fever, weakness and malaise/fatigue.   HEENT: Negative for headaches.   Cardiovascular: Negative for chest pain and claudication.   Respiratory: Negative for cough and shortness of breath.   Musculoskeletal: Positive for foot pain.  Negative for muscle cramps and muscle weakness.   Gastrointestinal: Negative for nausea and vomiting.   Neurological: Positive for numbness and paresthesias.   Dermatological: Negative for wound.        Objective:      Physical Exam  Constitutional:   Patient is oriented to person, place, and time. Vital signs are normal. Appears well-developed and well-nourished.     Vascular:   Dorsalis pedis pulses are 2+ on the right side, and 2+ on the left side.   Posterior tibial pulses are 2+ on the right side, and 2+ on the left side.   - digital hair growth, capillary fill time to all toes <3 seconds, toes are cool to touch  + swelling feet and ankles worse on the right    Skin/Dermatological:   Skin is thin, warm, shiny and atrophic. No cyanosis or clubbing. No rashes noted. No open wounds.   Right 1, 2, 3, 4, 5 left 1, 2, 3, 4, 5 toenails yellow discolored, thickened 3 mm,  elongated 3 mm with subungual debris and tenderness.  Keratosis right 1-5 MTHand lateral plantar fot, left 3,4 ,5 MTH  Right lateral hallux ingrown red ttp    Musculoskeletal:   Mild bunions, hammertoes and bunionettes observed.  Decreased range of motion of bilateral midtarsal, subtalar joints, ankle joint dorsiflexion is restricted bilaterally. No ankle joint rom right.  Muscle strength to tibialis anterior, extensor hallucis longus, extensor digitorum longus, peroneal muscles, flexor hallucis/digotorum longus, posterior tibial and gastrosoleal complex is 5/5.    Neurological:   Positive patchy  deficits to sharp/dull, light touch or vibratory sensation bilateral feet             Assessment:       1. Onychomycosis due to dermatophyte    2. Idiopathic peripheral neuropathy    3. H/O ankle fusion    4. Morbid obesity    5. Ingrown nail    6. Corn or callus        Plan:       Onychomycosis due to dermatophyte    Idiopathic peripheral neuropathy    H/O ankle fusion    Morbid obesity    Ingrown nail    Corn or callus          Shoe inspection. Foot Education. Patient instructed on proper foot hygeine. We discussed wearing proper shoe gear, daily foot inspections, never walking without protective shoe gear, never putting sharp instruments to feet.  We also discussed padding and shoes with high toe boxes for  foot deformities.    Spenco orthotics recommended    - With patient's permission, right 1, 2, 3, 4, 5 and left 1, 2, 3, 4, 5 toenails were aggressively reduced and debrided  to their soft tissue attachment mechanically with nail nipper, removing all offending nail and debris. The keratotic tissue was pared x 4+ with a 15 blade.  Patient relates relief following the procedure. Patient will continue to monitor the areas daily, inspect her feet, wear protective shoe gear when ambulatory, moisturizer to maintain skin integrity.    Paronychia/ingrown right lateral hallux toenail: A digital block to the right hallux with 2  1/2 cc of lidocaine 1% plain and 2-1/2 cc of Marcaine 0.75% plain was performed.  Informed consent was obtained with explanation of risks, indications, complications and alternatives.  A timeout was performed.   Laterality of the foot was noted.  A partial toenail avulsion of the right lateral hallux toenail was performed.  Silvadene and a dry sterile dressing was placed.  Patient tolerated well. There were no complications. Estimated blood loss was minimal.  Patient was given instructions for soaks with Epson salt and wound care to be done for the next two weeks. Patient will consider a phenol matrixectomy in the future.  Return to clinic 2 weeks.

## 2018-11-29 NOTE — PATIENT INSTRUCTIONS
Soaking instructions: Obtain epsom salts, soaking container, warm water. Neopsorin CREAM, FUNGAL CREAM and one inch band-aids.  Prior to bedtime:    1. Soak with the bandage on for ten minutes. Take bandage off and soak another ten minutes.  2. Pat dry and apply a thin coat of NEOSPORIN CREAM WITH FUNGAL CREAM and band-aid.    Proceed to do this every day and every night. DO NOT allow to dry out. Do this twice daily for one week.    WEEK 2     1. Soak for 15 min. At night and DO NOT apply cream. Allow to air dry by placing only a clean white sock over the foot.  2. Do NOT soak in the morning. Apply the NEOSPORIN CREAM AND FUNGAL CREAM and band-aid during the day.  3. Do this until drainage completely resolves.                                                   Spenco Orthotic Arch Supports                               SPENCO Orthotic Arch Supports (AKA Spenco Orthotic Insoles) are ¾ length nylon (plastic arch supports that are covered with PedidosYa / PedidosJÃ¡ classic green neoprene cushion material. SPENCO Orthotic Arch Supports are designed to support the medial and lateral arch. A SPENCO Orthotic Arch Support is ideal for people that need corrective support, but have tried other higher or harder orthotics and found them to be uncomfortable to wear. The nylon support of the SPENCO Orthotic Arch support is softer and more flexible than plastics used in other, harder orthotics and arch supports. This means that people with naturally lower arches or people with extremely flat feet with find that these arch supports are more comfortable to get used to than other higher arch supports.  May be obtained at:     Spenco.com   [employer].com

## 2018-12-04 ENCOUNTER — TELEPHONE (OUTPATIENT)
Dept: PODIATRY | Facility: CLINIC | Age: 68
End: 2018-12-04

## 2018-12-04 NOTE — TELEPHONE ENCOUNTER
Called and spoke with patient I explained to patient Spenco insoles is OTC.           ----- Message from Rayna Law sent at 12/3/2018 10:12 AM CST -----  Contact: Pt   Name of Who is Calling: GIOVANNA LUNDBERG [[id number]]      What is the request in detail: Patient is requesting a call from staff in regards to getting a RX for her foot sent to [facility]. Please contact to further discuss and advise      Can the clinic reply by [facility]: No       What Number to Call Back if not in [facility]: [phone]

## 2018-12-13 ENCOUNTER — OFFICE VISIT (OUTPATIENT)
Dept: PODIATRY | Facility: CLINIC | Age: 68
End: 2018-12-13
Payer: MEDICARE

## 2018-12-13 VITALS — BODY MASS INDEX: 53.18 KG/M2 | WEIGHT: 289 LBS | HEIGHT: 62 IN

## 2018-12-13 DIAGNOSIS — G60.9 IDIOPATHIC PERIPHERAL NEUROPATHY: ICD-10-CM

## 2018-12-13 DIAGNOSIS — L60.0 INGROWN TOENAIL OF LEFT FOOT: Primary | ICD-10-CM

## 2018-12-13 DIAGNOSIS — B35.1 ONYCHOMYCOSIS DUE TO DERMATOPHYTE: Primary | ICD-10-CM

## 2018-12-13 DIAGNOSIS — L60.0 INGROWN NAIL: ICD-10-CM

## 2018-12-13 PROCEDURE — 99999 PR PBB SHADOW E&M-EST. PATIENT-LVL III: CPT | Mod: PBBFAC,,,

## 2018-12-13 PROCEDURE — 1101F PT FALLS ASSESS-DOCD LE1/YR: CPT | Mod: CPTII,S$GLB,,

## 2018-12-13 PROCEDURE — 99212 OFFICE O/P EST SF 10 MIN: CPT | Mod: S$GLB,,,

## 2018-12-13 RX ORDER — TERBINAFINE HYDROCHLORIDE 250 MG/1
250 TABLET ORAL DAILY
Qty: 90 TABLET | Refills: 0 | Status: SHIPPED | OUTPATIENT
Start: 2018-12-13 | End: 2019-03-14

## 2018-12-13 RX ORDER — LIDOCAINE HYDROCHLORIDE 10 MG/ML
1 INJECTION, SOLUTION EPIDURAL; INFILTRATION; INTRACAUDAL; PERINEURAL ONCE
Status: CANCELLED | OUTPATIENT
Start: 2018-12-13 | End: 2018-12-13

## 2018-12-13 NOTE — PROGRESS NOTES
Reviewed the recent lft that patient brought me which were normal.  Ordered  lft and lamisil.  F/u 3 months, lft in 6 weeks.

## 2018-12-13 NOTE — PROGRESS NOTES
SUBJECTIVE: Patient returns to the clinic two week status post nail avulsion left lateral hallux which she states is fine but states newo her medial left hallux hurts.     PAST MEDICAL HISTORY: Unchanged    Physical examination: General: Pt. is in no acute distress, alert and oriented x 3.    Podiatric examination: Vascular:Capillary refill time is within normal limits.  Dermatologic: Surgical site is clean without any signs of infection. minimal drainage.  No significant erythema.  Medial left hallux ingrown ttp    IMPRESSION: Two week postop nail procedure with satisfactory progress no signs of infection.    PLAN: Patient to continue local care until completely healed with no drainage and no redness.    Schedule for phenol matriextomies b/l borders left hallux Dec 24 under local MAC

## 2018-12-14 ENCOUNTER — TELEPHONE (OUTPATIENT)
Dept: PODIATRY | Facility: CLINIC | Age: 68
End: 2018-12-14

## 2018-12-14 NOTE — TELEPHONE ENCOUNTER
Left message for patient Sx schedule [id number]          ----- Message from Rayna Law sent at 12/14/2018 10:13 AM CST -----  Contact: Pt   Name of Who is Calling: GIOVANNA LUNDBERG [[id number]]      What is the request in detail:Patient states Pre admit has the pt scheduled for 12/24 instead of 12/26. Please contact to further discuss and advise      Can the clinic reply by [facility]: No       What Number to Call Back if not in [facility]: [phone]

## 2018-12-18 ENCOUNTER — TELEPHONE (OUTPATIENT)
Dept: PODIATRY | Facility: CLINIC | Age: 68
End: 2018-12-18

## 2018-12-18 NOTE — TELEPHONE ENCOUNTER
Called and spoke with patient explained to patient Sx was denied by insurance company,Patient she going to call insurance company.          ----- Message from Joel Alcantar DPM sent at 12/18/2018  8:28 AM CST -----  Does this patient know that the surgery has been denied by the insurance company? Does she want to proceed?  ----- Message -----  From: Pamela Hatch MA  Sent: 12/17/2018  12:08 PM  To: Joel Alcantar DPM        ----- Message -----  From: Merle Tripp  Sent: 12/17/2018  11:03 AM  To: [facility] Staff    Gm I see that this pt case is marked as medically urgent will the Dr proceed because the pt surgery was denied by her insurance company because the dr and the facility is oon and the pt doesn't have oon benefits. thanks

## 2018-12-19 ENCOUNTER — TELEPHONE (OUTPATIENT)
Dept: PODIATRY | Facility: CLINIC | Age: 68
End: 2018-12-19

## 2018-12-19 NOTE — TELEPHONE ENCOUNTER
I left a message for the patient to return my call.              ----- Message from Lexi Zacarias sent at 12/19/2018  1:04 PM CST -----  Contact: pt            Name of Who is Calling: GIOVANNA LUNDBERG [[id number]]    What is the request in detail: pt returning call.. Please advise      Can the clinic reply by [facility]: no      What Number to Call Back if not in [facility]: [phone]

## 2018-12-20 ENCOUNTER — TELEPHONE (OUTPATIENT)
Dept: PODIATRY | Facility: CLINIC | Age: 68
End: 2018-12-20

## 2018-12-20 NOTE — TELEPHONE ENCOUNTER
Called and spoke with patient, Patient cancel her Sx schedule for 12-26-18.        ----- Message from Mckayla Cedeno sent at 12/20/2018  9:03 AM CST -----  Contact: Pt  Name of Who is Calling:GIOVANNA LUNDBERG [[id number]]    What is the request in detail: patient would like a call back regarding an insurance company, patient needs appeal for procedure Please contact to further discuss and advise    Can the clinic reply by [facility]: No   What Number to Call Back if not in [facility]: [phone]

## 2020-06-29 ENCOUNTER — TELEPHONE (OUTPATIENT)
Dept: INTERNAL MEDICINE | Facility: CLINIC | Age: 70
End: 2020-06-29

## 2020-06-29 NOTE — TELEPHONE ENCOUNTER
Will have someone contact she is requesting an appointment on the [employer] with   Dr Flaherty

## 2020-06-29 NOTE — TELEPHONE ENCOUNTER
----- Message from Keegan Law sent at 6/26/2020  9:58 AM CDT -----  Regarding: Refill request  Patient called in and wanted to speak with someone at the office regarding scheduling an appointment for a prescription refill. She would like a call back from the office and can be reached at    [phone]

## 2020-07-01 ENCOUNTER — OFFICE VISIT (OUTPATIENT)
Dept: URGENT CARE | Facility: CLINIC | Age: 70
End: 2020-07-01
Payer: MEDICARE

## 2020-07-01 VITALS
TEMPERATURE: 97 F | SYSTOLIC BLOOD PRESSURE: 113 MMHG | RESPIRATION RATE: 18 BRPM | DIASTOLIC BLOOD PRESSURE: 72 MMHG | OXYGEN SATURATION: 95 % | HEART RATE: 68 BPM

## 2020-07-01 DIAGNOSIS — L29.9 ITCHY SKIN: ICD-10-CM

## 2020-07-01 DIAGNOSIS — I10 HYPERTENSION, UNSPECIFIED TYPE: Primary | ICD-10-CM

## 2020-07-01 PROCEDURE — 99214 PR OFFICE/OUTPT VISIT, EST, LEVL IV, 30-39 MIN: ICD-10-PCS | Mod: S$GLB,,, | Performed by: PHYSICIAN ASSISTANT

## 2020-07-01 PROCEDURE — 99214 OFFICE O/P EST MOD 30 MIN: CPT | Mod: S$GLB,,, | Performed by: PHYSICIAN ASSISTANT

## 2020-07-01 RX ORDER — HYDROXYZINE HYDROCHLORIDE 25 MG/1
25 TABLET, FILM COATED ORAL DAILY PRN
Qty: 7 TABLET | Refills: 0 | Status: SHIPPED | OUTPATIENT
Start: 2020-07-01 | End: 2020-07-08

## 2020-07-01 RX ORDER — TRIAMTERENE AND HYDROCHLOROTHIAZIDE 37.5; 25 MG/1; MG/1
1 CAPSULE ORAL EVERY MORNING
COMMUNITY
End: 2020-07-01 | Stop reason: SDUPTHER

## 2020-07-01 RX ORDER — TRIAMTERENE AND HYDROCHLOROTHIAZIDE 37.5; 25 MG/1; MG/1
1 CAPSULE ORAL EVERY MORNING
Qty: 30 CAPSULE | Refills: 0 | Status: SHIPPED | OUTPATIENT
Start: 2020-07-01 | End: 2020-07-16 | Stop reason: SDUPTHER

## 2020-07-01 NOTE — PROGRESS NOTES
Subjective:       Patient ID: Ni Montejo is a 69 y.o. female.    Vitals:  temperature is 96.6 °F (35.9 °C). Her blood pressure is 113/72 and her pulse is 68. Her respiration is 18 and oxygen saturation is 95%.     Chief Complaint: Hypertension (refilled for hypertension and allergy medication) and Numbness (of lips and dry month)      This is a 69-year-old female with history of high blood pressure and reflux who presents requesting blood pressure medication refill.  She has been on Dyazide 37.5-25 for 3 years.  Denies any dosage medication within the past 3 years.  She has a prescription bottle with her name and date of birth for this medication in clinic today that was given to her 3 months ago with 3 months with medication.  States that she took her last pill today  .  Blood pressure is 113/72 at this time.  Pulse 68.    She is also requesting a refill of Prinzide 20-12.5 daily.  She would like to take this in addition to her Dyazide.  There is no documentation of her previously being prescribed this together with Dyazide.  She has not been taking Prinzide over the past couple of weeks.  She states that when she was taking Prinzide, it was not her prescription and it was someone else's.    She is also requesting a refill of Atarax which she has taken for years now.  States she takes this for allergies and itching.  She has not taken this in the past few months.  She reports mild skin itching at this time    Complaining of lip dryness recently.  Denies any at this time.  Denies numbness, tingling, headache, weakness, nausea, vomiting, chest pain, shortness of breath, palpitations.  She feels very well otherwise.    Of note, she has a primary care appointment but it is not until August.    Hypertension  This is a chronic problem. The current episode started more than 1 year ago. The problem is unchanged. The problem is controlled. Pertinent negatives include no anxiety, blurred vision, chest pain, headaches,  malaise/fatigue, neck pain, orthopnea, palpitations, peripheral edema, PND, shortness of breath or sweats.       Constitution: Negative for chills, fatigue and fever.   HENT: Negative for congestion and sore throat.    Neck: Negative for neck pain and painful lymph nodes.   Cardiovascular: Negative for chest pain, leg swelling and palpitations.   Eyes: Negative for double vision and blurred vision.   Respiratory: Negative for cough and shortness of breath.    Gastrointestinal: Positive for diarrhea (One time recently.  Now resolved.). Negative for nausea and vomiting.   Genitourinary: Negative for dysuria, frequency, urgency and history of kidney stones.   Musculoskeletal: Negative for joint pain, joint swelling, muscle cramps and muscle ache.   Skin: Negative for color change, pale, rash and bruising.   Allergic/Immunologic: Negative for seasonal allergies.   Neurological: Negative for dizziness, history of vertigo, light-headedness, passing out and headaches.   Hematologic/Lymphatic: Negative for swollen lymph nodes.   Psychiatric/Behavioral: Negative for nervous/anxious, sleep disturbance and depression. The patient is not nervous/anxious.        Objective:      Physical Exam   Constitutional: She is oriented to person, place, and time. She appears well-developed. She is cooperative.  Non-toxic appearance. She does not appear ill. No distress.   HENT:   Head: Normocephalic and atraumatic.   Right Ear: Hearing, tympanic membrane, external ear and ear canal normal.   Left Ear: Hearing, tympanic membrane, external ear and ear canal normal.   Nose: Nose normal. No mucosal edema, rhinorrhea or nasal deformity. No epistaxis. Right sinus exhibits no maxillary sinus tenderness and no frontal sinus tenderness. Left sinus exhibits no maxillary sinus tenderness and no frontal sinus tenderness.   Mouth/Throat: Uvula is midline, oropharynx is clear and moist and mucous membranes are normal. No trismus in the jaw. Normal  dentition. No uvula swelling. No posterior oropharyngeal erythema.   Lips appear well hydrated      Comments: Lips appear well hydrated  Eyes: Conjunctivae and lids are normal. Right eye exhibits no discharge. Left eye exhibits no discharge. No scleral icterus.   Neck: Trachea normal, normal range of motion, full passive range of motion without pain and phonation normal. Neck supple.   Cardiovascular: Normal rate, regular rhythm, normal heart sounds and normal pulses.   Pulmonary/Chest: Effort normal and breath sounds normal. No respiratory distress.   Abdominal: Soft. Normal appearance and bowel sounds are normal. She exhibits no distension, no pulsatile midline mass and no mass. There is no abdominal tenderness.   Musculoskeletal: Normal range of motion.         General: No deformity.   Neurological: She is alert and oriented to person, place, and time. She exhibits normal muscle tone. Coordination normal.   Skin: Skin is warm, dry, intact, not diaphoretic and not pale.   Psychiatric: Her speech is normal and behavior is normal. Judgment and thought content normal.   Nursing note and vitals reviewed.        Assessment:       1. Hypertension, unspecified type    2. Itchy skin        Plan:       Patient presents me with prescription proof of recently being prescribed Dyazide.  States she has been on this for years with great blood pressure control.  Will refill this and Atarax for itching as needed.  Discussed with patient that it is very important she see primary care within the next 3 days.  She needs follow up, refills, labs.  Given her a referral in clinic  number.  She should go to the emergency department for any new, changing, worsening or concerning symptoms.    Hypertension, unspecified type  -     triamterene-hydrochlorothiazide 37.5-25 mg (DYAZIDE) 37.5-25 mg per capsule; Take 1 capsule by mouth every morning.  Dispense: 30 capsule; Refill: 0  -     Ambulatory referral/consult to [facility]    Itchy skin  -     hydrOXYzine HCL (ATARAX) 25 MG tablet; Take 1 tablet (25 mg total) by mouth daily as needed for Itching.  Dispense: 7 tablet; Refill: 0    Labs reviewed, pertinent imaging reviewed, previous medical records, medical history, surgical history, social history, family history reviewed.       Patient Instructions   Please see primary care within next 3 days for further evaluation, follow-up, refills, lab work  Call [phone] to make appointment.  I have placed referral for you  Continue blood pressure medications as prescribed    If your blood pressure becomes too high or too low or you have any kind of new, concerning, change in symptoms please go to the emergency department    Please use Aquaphor or Vaseline on your lips for dryness    Please arrange follow up with your primary medical clinic as soon as possible. You must understand that you've received an Urgent Care treatment only and that you may be released before all of your medical problems are known or treated. You, the patient, will arrange for follow up as instructed. If your symptoms worsen or fail to improve you should go to the Emergency Room.  WE CANNOT RULE OUT ALL POSSIBLE CAUSES OF YOUR SYMPTOMS IN THE URGENT CARE SETTING PLEASE GO TO THE ER IF YOU FEELS YOUR CONDITION IS WORSENING OR YOU WOULD LIKE EMERGENT EVALUATION.    Please return here or go to the Emergency Department for any concerns or worsening of condition.  If you were prescribed antibiotics, please take them to completion.  If you were prescribed a narcotic medication, do not drive or operate heavy equipment or machinery while taking these medications.  Please follow up with your primary care doctor or specialist as needed.    If you  smoke, please stop smoking.    Please return here or go to the Emergency Department for any concerns or worsening of condition.  If you were prescribed antibiotics, please take them to completion.  If you were prescribed a  narcotic medication, do not drive or operate heavy equipment or machinery while taking these medications.  Please follow up with your primary care doctor or specialist as needed.    If you  smoke, please stop smoking.      Established High Blood Pressure    High blood pressure (hypertension) is a chronic disease. Often, healthcare providers dont know what causes it. But it can be caused by certain health conditions and medicines.  If you have high blood pressure, you may not have any symptoms. If you do have symptoms, they may include headache, dizziness, changes in your vision, chest pain, and shortness of breath. But even without symptoms, high blood pressure thats not treated raises your risk for heart attack and stroke. High blood pressure is a serious health risk and shouldnt be ignored.  A blood pressure reading is made up of two numbers: a higher number over a lower number. The top number is the systolic pressure. The bottom number is the diastolic pressure. A normal blood pressure is a systolic pressure of  less than 120 over a diastolic pressure of less than 80. You will see your blood pressure readings written together. For example, a person with a systolic pressure of 188 and a diastolic pressure of 78 will have 118/78 written in the medical record.  High blood pressure is when either the top number is 140 or higher, or the bottom number is 90 or higher. This must be the result when taking your blood pressure a number of times. The blood pressures between normal and high are called prehypertension.  Home care  If you have high blood pressure, you should do what is listed below to lower your blood pressure. If you are taking medicines for high blood pressure, these methods may reduce or end your need for medicines in the future.  · Begin a weight-loss program if you are overweight.  · Cut back on how much salt you get in your diet. Heres how to do this:  ¨ Dont eat foods that have a lot of salt. These  include olives, pickles, smoked meats, and salted potato chips.  ¨ Dont add salt to your food at the table.  ¨ Use only small amounts of salt when cooking.  · Start an exercise program. Talk with your healthcare provider about the type of exercise program that would be best for you. It doesn't have to be hard. Even brisk walking for 20 minutes 3 times a week is a good form of exercise.  · Dont take medicines that stimulate the heart. This includes many over-the-counter cold and sinus decongestant pills and sprays, as well as diet pills. Check the warnings about hypertension on the label. Before buying any over-the-counter medicines or supplements, always ask the pharmacist about the product's potential interaction with your high blood pressure and your high blood pressure medicines.  · Stimulants such as amphetamine or cocaine could be deadly for someone with high blood pressure. Never take these.  · Limit how much caffeine you get in your diet. Switch to caffeine-free products.  · Stop smoking. If you are a long-time smoker, this can be hard. Talk to your healthcare provider about medicines and nicotine replacement options to help you. Also, enroll in a stop-smoking program to make it more likely that you will quit for good.  · Learn how to handle stress. This is an important part of any program to lower blood pressure. Learn about relaxation methods like meditation, yoga, or biofeedback.  · If your provider prescribed medicines, take them exactly as directed. Missing doses may cause your blood pressure get out of control.  · If you miss a dose or doses, check with your healthcare provider or pharmacist about what to do.  · Consider buying an automatic blood pressure machine. Ask your provider for a recommendation. You can get one of these at most pharmacies.     The American Heart Association recommends the following guidelines for home blood pressure monitoring:  · Don't smoke or drink coffee for 30 minutes  before taking your blood pressure.  · Go to the bathroom before the test.  · Relax for 5 minutes before taking the measurement.  · Sit with your back supported (don't sit on a couch or soft chair); keep your feet on the floor uncrossed. Place your arm on a solid flat surface (like a table) with the upper part of the arm at heart level. Place the middle of the cuff directly above the eye of the elbow. Check the monitor's instruction manual for an illustration.  · Take multiple readings. When you measure, take 2 to 3 readings one minute apart and record all of the results.  · Take your blood pressure at the same time every day, or as your healthcare provider recommends.  · Record the date, time, and blood pressure reading.  · Take the record with you to your next medical appointment. If your blood pressure monitor has a built-in memory, simply take the monitor with you to your next appointment.  · Call your provider if you have several high readings. Don't be frightened by a single high blood pressure reading, but if you get several high readings, check in with your healthcare provider.  · Note: When blood pressure reaches a systolic (top number) of 180 or higher OR diastolic (bottom number) of 110 or higher, seek emergency medical treatment.  Follow-up care  You will need to see your healthcare provider regularly. This is to check your blood pressure and to make changes to your medicines. Make a follow-up appointment as directed. Bring the record of your home blood pressure readings to the appointment.  When to seek medical advice  Call your healthcare provider right away if any of these occur:  · Blood pressure reaches a systolic (upper number) of 180 or higher OR a diastolic (bottom number) of 110 or higher  · Chest pain or shortness of breath  · Severe headache  · Throbbing or rushing sound in the ears  · Nosebleed  · Sudden severe pain in your belly (abdomen)  · Extreme drowsiness, confusion, or  fainting  · Dizziness or spinning sensation (vertigo)  · Weakness of an arm or leg or one side of the face  · You have problems speaking or seeing   Date Last Reviewed: 12/1/2016  © 4034-5296 [employer]. [address] [place] [address], [place] [address]. All rights reserved. This information is not intended as a substitute for professional medical care. Always follow your healthcare professional's instructions.

## 2020-07-01 NOTE — PATIENT INSTRUCTIONS
Please see primary care within next 3 days for further evaluation, follow-up, refills, lab work  Call [phone] to make appointment.  I have placed referral for you  Continue blood pressure medications as prescribed    If your blood pressure becomes too high or too low or you have any kind of new, concerning, change in symptoms please go to the emergency department    Please use Aquaphor or Vaseline on your lips for dryness    Please arrange follow up with your primary medical clinic as soon as possible. You must understand that you've received an Urgent Care treatment only and that you may be released before all of your medical problems are known or treated. You, the patient, will arrange for follow up as instructed. If your symptoms worsen or fail to improve you should go to the Emergency Room.  WE CANNOT RULE OUT ALL POSSIBLE CAUSES OF YOUR SYMPTOMS IN THE URGENT CARE SETTING PLEASE GO TO THE ER IF YOU FEELS YOUR CONDITION IS WORSENING OR YOU WOULD LIKE EMERGENT EVALUATION.    Please return here or go to the Emergency Department for any concerns or worsening of condition.  If you were prescribed antibiotics, please take them to completion.  If you were prescribed a narcotic medication, do not drive or operate heavy equipment or machinery while taking these medications.  Please follow up with your primary care doctor or specialist as needed.    If you  smoke, please stop smoking.    Please return here or go to the Emergency Department for any concerns or worsening of condition.  If you were prescribed antibiotics, please take them to completion.  If you were prescribed a narcotic medication, do not drive or operate heavy equipment or machinery while taking these medications.  Please follow up with your primary care doctor or specialist as needed.    If you  smoke, please stop smoking.      Established High Blood Pressure    High blood pressure (hypertension) is a chronic disease. Often, healthcare providers  dont know what causes it. But it can be caused by certain health conditions and medicines.  If you have high blood pressure, you may not have any symptoms. If you do have symptoms, they may include headache, dizziness, changes in your vision, chest pain, and shortness of breath. But even without symptoms, high blood pressure thats not treated raises your risk for heart attack and stroke. High blood pressure is a serious health risk and shouldnt be ignored.  A blood pressure reading is made up of two numbers: a higher number over a lower number. The top number is the systolic pressure. The bottom number is the diastolic pressure. A normal blood pressure is a systolic pressure of  less than 120 over a diastolic pressure of less than 80. You will see your blood pressure readings written together. For example, a person with a systolic pressure of 188 and a diastolic pressure of 78 will have 118/78 written in the medical record.  High blood pressure is when either the top number is 140 or higher, or the bottom number is 90 or higher. This must be the result when taking your blood pressure a number of times. The blood pressures between normal and high are called prehypertension.  Home care  If you have high blood pressure, you should do what is listed below to lower your blood pressure. If you are taking medicines for high blood pressure, these methods may reduce or end your need for medicines in the future.  · Begin a weight-loss program if you are overweight.  · Cut back on how much salt you get in your diet. Heres how to do this:  ¨ Dont eat foods that have a lot of salt. These include olives, pickles, smoked meats, and salted potato chips.  ¨ Dont add salt to your food at the table.  ¨ Use only small amounts of salt when cooking.  · Start an exercise program. Talk with your healthcare provider about the type of exercise program that would be best for you. It doesn't have to be hard. Even brisk walking for 20  minutes 3 times a week is a good form of exercise.  · Dont take medicines that stimulate the heart. This includes many over-the-counter cold and sinus decongestant pills and sprays, as well as diet pills. Check the warnings about hypertension on the label. Before buying any over-the-counter medicines or supplements, always ask the pharmacist about the product's potential interaction with your high blood pressure and your high blood pressure medicines.  · Stimulants such as amphetamine or cocaine could be deadly for someone with high blood pressure. Never take these.  · Limit how much caffeine you get in your diet. Switch to caffeine-free products.  · Stop smoking. If you are a long-time smoker, this can be hard. Talk to your healthcare provider about medicines and nicotine replacement options to help you. Also, enroll in a stop-smoking program to make it more likely that you will quit for good.  · Learn how to handle stress. This is an important part of any program to lower blood pressure. Learn about relaxation methods like meditation, yoga, or biofeedback.  · If your provider prescribed medicines, take them exactly as directed. Missing doses may cause your blood pressure get out of control.  · If you miss a dose or doses, check with your healthcare provider or pharmacist about what to do.  · Consider buying an automatic blood pressure machine. Ask your provider for a recommendation. You can get one of these at most pharmacies.     The American Heart Association recommends the following guidelines for home blood pressure monitoring:  · Don't smoke or drink coffee for 30 minutes before taking your blood pressure.  · Go to the bathroom before the test.  · Relax for 5 minutes before taking the measurement.  · Sit with your back supported (don't sit on a couch or soft chair); keep your feet on the floor uncrossed. Place your arm on a solid flat surface (like a table) with the upper part of the arm at heart level.  Place the middle of the cuff directly above the eye of the elbow. Check the monitor's instruction manual for an illustration.  · Take multiple readings. When you measure, take 2 to 3 readings one minute apart and record all of the results.  · Take your blood pressure at the same time every day, or as your healthcare provider recommends.  · Record the date, time, and blood pressure reading.  · Take the record with you to your next medical appointment. If your blood pressure monitor has a built-in memory, simply take the monitor with you to your next appointment.  · Call your provider if you have several high readings. Don't be frightened by a single high blood pressure reading, but if you get several high readings, check in with your healthcare provider.  · Note: When blood pressure reaches a systolic (top number) of 180 or higher OR diastolic (bottom number) of 110 or higher, seek emergency medical treatment.  Follow-up care  You will need to see your healthcare provider regularly. This is to check your blood pressure and to make changes to your medicines. Make a follow-up appointment as directed. Bring the record of your home blood pressure readings to the appointment.  When to seek medical advice  Call your healthcare provider right away if any of these occur:  · Blood pressure reaches a systolic (upper number) of 180 or higher OR a diastolic (bottom number) of 110 or higher  · Chest pain or shortness of breath  · Severe headache  · Throbbing or rushing sound in the ears  · Nosebleed  · Sudden severe pain in your belly (abdomen)  · Extreme drowsiness, confusion, or fainting  · Dizziness or spinning sensation (vertigo)  · Weakness of an arm or leg or one side of the face  · You have problems speaking or seeing   Date Last Reviewed: 12/1/2016  © 7973-2860 [employer]. [address] [place] [address], [place] [address]. All rights reserved. This information is not intended as a substitute for professional  medical care. Always follow your healthcare professional's instructions.

## 2020-07-15 PROCEDURE — 99285 EMERGENCY DEPT VISIT HI MDM: CPT | Mod: 25,ER

## 2020-07-15 PROCEDURE — 93010 EKG 12-LEAD: ICD-10-PCS | Mod: ,,, | Performed by: INTERNAL MEDICINE

## 2020-07-15 PROCEDURE — 93010 ELECTROCARDIOGRAM REPORT: CPT | Mod: ,,, | Performed by: INTERNAL MEDICINE

## 2020-07-15 PROCEDURE — 93005 ELECTROCARDIOGRAM TRACING: CPT | Mod: ER

## 2020-07-16 ENCOUNTER — HOSPITAL ENCOUNTER (EMERGENCY)
Facility: HOSPITAL | Age: 70
Discharge: HOME | End: 2020-07-16
Attending: INTERNAL MEDICINE
Payer: COMMERCIAL

## 2020-07-16 VITALS
BODY MASS INDEX: 47.84 KG/M2 | SYSTOLIC BLOOD PRESSURE: 160 MMHG | WEIGHT: 260 LBS | HEIGHT: 62 IN | OXYGEN SATURATION: 97 % | RESPIRATION RATE: 20 BRPM | TEMPERATURE: 98 F | DIASTOLIC BLOOD PRESSURE: 70 MMHG | HEART RATE: 55 BPM

## 2020-07-16 DIAGNOSIS — I10 HYPERTENSION, UNSPECIFIED TYPE: ICD-10-CM

## 2020-07-16 DIAGNOSIS — R07.9 CHEST PAIN: ICD-10-CM

## 2020-07-16 DIAGNOSIS — R07.89 CHEST DISCOMFORT: ICD-10-CM

## 2020-07-16 LAB
ALBUMIN SERPL-MCNC: 3.5 G/DL (ref 3.3–5.5)
ALP SERPL-CCNC: 77 U/L (ref 42–141)
BILIRUB SERPL-MCNC: 0.5 MG/DL (ref 0.2–1.6)
BUN SERPL-MCNC: 13 MG/DL (ref 7–22)
CALCIUM SERPL-MCNC: 9.2 MG/DL (ref 8–10.3)
CHLORIDE SERPL-SCNC: 102 MMOL/L (ref 98–108)
CREAT SERPL-MCNC: 1 MG/DL (ref 0.6–1.2)
GLUCOSE SERPL-MCNC: 105 MG/DL (ref 73–118)
POC ALT (SGPT): 19 U/L (ref 10–47)
POC AST (SGOT): 30 U/L (ref 11–38)
POC CARDIAC TROPONIN I: 0 NG/ML
POC TCO2: 29 MMOL/L (ref 18–33)
POTASSIUM BLD-SCNC: 3.6 MMOL/L (ref 3.6–5.1)
PROTEIN, POC: 7.4 G/DL (ref 6.4–8.1)
SAMPLE: NORMAL
SODIUM BLD-SCNC: 139 MMOL/L (ref 128–145)

## 2020-07-16 PROCEDURE — 25000003 PHARM REV CODE 250: Mod: ER | Performed by: INTERNAL MEDICINE

## 2020-07-16 PROCEDURE — 84484 ASSAY OF TROPONIN QUANT: CPT | Mod: ER

## 2020-07-16 PROCEDURE — 85025 COMPLETE CBC W/AUTO DIFF WBC: CPT | Mod: ER

## 2020-07-16 PROCEDURE — 80053 COMPREHEN METABOLIC PANEL: CPT | Mod: ER

## 2020-07-16 RX ORDER — TRIAMTERENE AND HYDROCHLOROTHIAZIDE 37.5; 25 MG/1; MG/1
1 CAPSULE ORAL EVERY MORNING
Qty: 30 CAPSULE | Refills: 0 | Status: SHIPPED | OUTPATIENT
Start: 2020-07-16 | End: 2020-08-06 | Stop reason: SDUPTHER

## 2020-07-16 RX ORDER — ACETAMINOPHEN 500 MG
1000 TABLET ORAL
Status: COMPLETED | OUTPATIENT
Start: 2020-07-16 | End: 2020-07-16

## 2020-07-16 RX ORDER — LISINOPRIL 20 MG/1
20 TABLET ORAL DAILY
Qty: 90 TABLET | Refills: 3 | Status: SHIPPED | OUTPATIENT
Start: 2020-07-16 | End: 2020-12-21 | Stop reason: SDUPTHER

## 2020-07-16 RX ADMIN — ACETAMINOPHEN 1000 MG: 500 TABLET ORAL at 01:07
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
## 2020-07-16 NOTE — ED NOTES
Ni Obrienrespresents to ED with c/o left arm pain for a week , sometimes the right side as well   Mucous membranes are pink and moist. Skin is warm, dry and intact. Lungs are clear bilaterally, respirations are regular and unlabored. Denies SOB, cough, congestion or rhinorrhea. BS active x4, no tenderness with palpitation, abd is soft and not distended. Denies any appetite or activity change. S1S2, capillary refill is < 2 secs.Denies dysuria, difficulty urinating, frequency, numbness, tingling or weakness.

## 2020-08-06 ENCOUNTER — OFFICE VISIT (OUTPATIENT)
Dept: INTERNAL MEDICINE | Facility: CLINIC | Age: 70
End: 2020-08-06
Payer: COMMERCIAL

## 2020-08-06 VITALS
WEIGHT: 280 LBS | HEIGHT: 63 IN | BODY MASS INDEX: 49.61 KG/M2 | OXYGEN SATURATION: 95 % | SYSTOLIC BLOOD PRESSURE: 132 MMHG | DIASTOLIC BLOOD PRESSURE: 76 MMHG | HEART RATE: 66 BPM

## 2020-08-06 DIAGNOSIS — I10 HYPERTENSION, UNSPECIFIED TYPE: ICD-10-CM

## 2020-08-06 DIAGNOSIS — J30.2 SEASONAL ALLERGIES: Primary | ICD-10-CM

## 2020-08-06 DIAGNOSIS — G47.00 INSOMNIA, UNSPECIFIED TYPE: ICD-10-CM

## 2020-08-06 PROCEDURE — 99214 PR OFFICE/OUTPT VISIT, EST, LEVL IV, 30-39 MIN: ICD-10-PCS | Mod: S$GLB,,, | Performed by: INTERNAL MEDICINE

## 2020-08-06 PROCEDURE — 99999 PR PBB SHADOW E&M-EST. PATIENT-LVL IV: CPT | Mod: PBBFAC,,, | Performed by: INTERNAL MEDICINE

## 2020-08-06 PROCEDURE — 99999 PR PBB SHADOW E&M-EST. PATIENT-LVL IV: ICD-10-PCS | Mod: PBBFAC,,, | Performed by: INTERNAL MEDICINE

## 2020-08-06 PROCEDURE — 99214 OFFICE O/P EST MOD 30 MIN: CPT | Mod: S$GLB,,, | Performed by: INTERNAL MEDICINE

## 2020-08-06 RX ORDER — FLUTICASONE PROPIONATE 50 MCG
1 SPRAY, SUSPENSION (ML) NASAL DAILY
Qty: 16 G | Refills: 2 | Status: SHIPPED | OUTPATIENT
Start: 2020-08-06 | End: 2021-03-01 | Stop reason: SDUPTHER

## 2020-08-06 RX ORDER — TRIAMTERENE AND HYDROCHLOROTHIAZIDE 37.5; 25 MG/1; MG/1
1 CAPSULE ORAL EVERY MORNING
Qty: 90 CAPSULE | Refills: 3 | OUTPATIENT
Start: 2020-08-06 | End: 2020-08-31 | Stop reason: SDUPTHER

## 2020-08-06 RX ORDER — HYDROXYZINE HYDROCHLORIDE 25 MG/1
25 TABLET, FILM COATED ORAL 4 TIMES DAILY
Qty: 60 TABLET | Refills: 3 | Status: SHIPPED | OUTPATIENT
Start: 2020-08-06 | End: 2020-12-15

## 2020-08-06 RX ORDER — TRAZODONE HYDROCHLORIDE 50 MG/1
50 TABLET ORAL NIGHTLY
Qty: 30 TABLET | Refills: 11 | Status: SHIPPED | OUTPATIENT
Start: 2020-08-06 | End: 2021-08-02

## 2020-08-06 NOTE — LETTER
August 6, 2020      Marija Grimm PA-C  [address] [facility] [address]           [facility] - Internal Medicine  [address]  [place] [address]  Phone: [phone]  Fax: [phone]          Patient: Ni Montejo   MR Number: [id number]   YOB: 1950   Date of Visit: 8/6/2020       Dear Marija Grimm:    Thank you for referring Ni Montejo to me for evaluation. Attached you will find relevant portions of my assessment and plan of care.    If you have questions, please do not hesitate to call me. I look forward to following Ni Montejo along with you.    Sincerely,    Giovanni Chi MD    Enclosure  CC:  No Recipients    If you would like to receive this communication electronically, please contact [email or web address] or [phone] to request more information on [employer] access.    For providers and/or their staff who would like to refer a patient to [facility], please contact us through our one-stop-shop provider referral line, [facility], at [phone].    If you feel you have received this communication in error or would no longer like to receive these types of communications, please e-mail [email or web address]

## 2020-08-06 NOTE — PROGRESS NOTES
CHIEF COMPLAINT     Chief Complaint   Patient presents with    Establish Care     is getting est. as a new patient. inquiries about being placed on Health Delivery for BMI numbers.    Hypertension     dx of HTN, currently takes Triamterene-HCTZ but is requesting refills on Lisinopril 20 MG (without the fluid pill) sent to pharmacy.    Insomnia     not sleeping at all during the night. reports of only getting 3-4 hrs of sleep per night. Hydroxyzine normally helps patient with sleep. brain is constantly working and thinking.     Headache     reports of a nagging, annoyed headache (not constant), sometimes lasts for hrs in a day.        HPI     Ni Montejo is a 69 y.o. female here today for       Insomnia  15 minute SOL- Reports trouble falling back to sleep after getting up to use restroom.  +Snores, told she makes unusual noises when she sleeps. Previous tried melatonin unknown doses. Gets up 1 time nightly to sleep.  Reports sleep has been worse over the past month  Sleep hygiene; cold and dark room  -Nap,     Allergies  She takes Atarax to 4 times a day.  Has not really used intranasal steroid.  She has tried nondrowsy antihistamines they are not effective.  Report this medication sometimes makes her drowsy but denies any recent falls.    Hypertension  Taking Dyazide and lisinopril 20.  Does not check her pressure home.  Denies symptoms of hypo tension.  Risk factors high BMI, suspected sleep apnea     Personally Reviewed Patient's Medical, surgical, family and social hx. Changes updated in [facility].  Care Team updated in Epic    Review of Systems:  Review of Systems   Respiratory: Negative for cough and shortness of breath.    Cardiovascular: Negative for leg swelling.   Gastrointestinal: Negative for blood in stool and constipation.   Musculoskeletal: Positive for arthralgias and gait problem.   Psychiatric/Behavioral: Positive for sleep disturbance.       Health Maintenance:   Reviewed with patient  Due  "for the following:      PHYSICAL EXAM     /76 (BP Location: Left arm, Patient Position: Sitting, BP Method: Large (Manual))   Pulse 66   Ht 5' 3" (1.6 m)   Wt 127 kg (280 lb)   LMP  (LMP Unknown)   SpO2 95%   BMI 49.60 kg/m²     Gen: Well Appearing, NAD, obese, in wheelchair  HEENT: PERR, EOMI  Neck: FROM, no thyromegaly, no cervical adenopathy  CVD: RRR, no M/R/G  Pulm: Normal work of breathing, CTAB, no wheezing  Abd:  Soft, NT, ND non TTP, no mass  MSK:   R: ankle scar, edema (chronic)  L ankle no edema  Neuro: A&Ox3, , speech normal  Mood; Mood normal, behavior normal, thought process linear       LABS     Labs reviewed;   CMP  CMP normal    ASSESSMENT     1. Seasonal allergies  hydrOXYzine HCL (ATARAX) 25 MG tablet    fluticasone propionate (FLONASE) 50 mcg/actuation nasal spray   2. Insomnia, unspecified type  Ambulatory referral/consult to Sleep Disorders    traZODone (DESYREL) 50 MG tablet   3. Essential hypertension, benign     4. Hypertension, unspecified type  triamterene-hydrochlorothiazide 37.5-25 mg (DYAZIDE) 37.5-25 mg per capsule   5. BMI 45.0-49.9, adult             Plan     Ni Montejo is a 69 y.o. female with hypertension, acid reflux, morbid obesity seasonal allergies.  Here today to establish care for below issues.  Will have her sign a release form from her previous doctors week review health maintenance at her upcoming visit.  1. Seasonal allergies  Discussed about thrilled with her using a 1st generation antihistamine did increased drowsiness and fall risk will add intranasal steroid to see if we can improve symptomatic control and hopefully reduce amount of hydroxyzine.  - hydrOXYzine HCL (ATARAX) 25 MG tablet; Take 1 tablet (25 mg total) by mouth 4 (four) times daily.  Dispense: 60 tablet; Refill: 3  - fluticasone propionate (FLONASE) 50 mcg/actuation nasal spray; 1 spray (50 mcg total) by Each Nostril route once daily.  Dispense: 16 g; Refill: 2    2. Insomnia, unspecified " type  Appears multifactorial.  Suspect sleep-related breathing disorder contributing.  Discussed sleep hygiene gave her handout.  Will try trazodone at night.  Ask patient not to take hydroxyzine and trazodone at the same time period  - Ambulatory referral/consult to Sleep Disorders; Future  - traZODone (DESYREL) 50 MG tablet; Take 1 tablet (50 mg total) by mouth every evening.  Dispense: 30 tablet; Refill: 11      3. Hypertension, unspecified type  - triamterene-hydrochlorothiazide 37.5-25 mg (DYAZIDE) 37.5-25 mg per capsule; Take 1 capsule by mouth every morning.  Dispense: 90 capsule; Refill: 3  At goal continue Dyazide and lisinopril 20  Recommend lifestyle modification including weight loss, low-salt intake and treatment of comorbid sleep-related breathing disorder    4. BMI 45.0-49.9, adult  Patient wants to if she is eligible for some food delivery think through her insurance.  Will be on the lookout for form  Discussed lifestyle interventions for weight loss  Diet: harm reduction strategy: avoid liquid calories, add fruits and veggies to crowd out refined carbs, watch portions, eat at home more often  Activity: activity levelgoal of 150min/week  Sleep: optimize for 8 hours of sleep nightly  Hedonistic eating: avoid eating out of boredom, stress or in front of TV      Giovanni Chi MD

## 2020-08-11 ENCOUNTER — TELEPHONE (OUTPATIENT)
Dept: INTERNAL MEDICINE | Facility: CLINIC | Age: 70
End: 2020-08-11

## 2020-08-11 NOTE — TELEPHONE ENCOUNTER
Called and spoke to Ilene. Did inform Ilene that we have not received the form regarding getting patient's BMI.   According to chart, BMI adult 450-49.9. will fax form once we receive it.

## 2020-08-11 NOTE — TELEPHONE ENCOUNTER
----- Message from Armida Pulido sent at 8/11/2020  1:15 PM CDT -----  Contact: Ilene vanegas [place] fax @ [phone]  Ilene with [employer] requested patient BMI in order for her to qualify for her Healthy Food Delivers  benefits she's trying to get. She has left message prior to  today. Please call back with this information       Ref # [id number] this must accompany the fax with her BMI.  Fax [phone]

## 2020-08-14 ENCOUNTER — TELEPHONE (OUTPATIENT)
Dept: INTERNAL MEDICINE | Facility: CLINIC | Age: 70
End: 2020-08-14

## 2020-08-14 NOTE — TELEPHONE ENCOUNTER
Called and spoke to patient. Patient stated that she is getting different instructions on how to take the Flonase nasal spray. Per chart, it states for 1 spray (50 mcg total) by each nostril route once daily. Patient stated that is spray to both nostrils considered 1 or 2 applications? Patient is verbalizing that her Rx bottle and the box states two different things.   On another note, patient stated that the sleep med-Trazodone is not working at all for her. Patient already has difficulty sleeping but she is not sleeping at all she says. Patient is asking should there be an increased dose or will she need to set up an appointment to discuss?    Please adv.

## 2020-08-14 NOTE — TELEPHONE ENCOUNTER
----- Message from Renata Espino sent at 8/14/2020  8:05 AM CDT -----  Contact: Patient [phone]  Requesting to speak with you regarding Rx fluticasone propionate (FLONASE) 50 mcg/actuation nasal spray. Does not understand directions and needs clarification.    Please call and advise.    Thank You

## 2020-08-14 NOTE — TELEPHONE ENCOUNTER
----- Message from Renata Espino sent at 8/14/2020  8:05 AM CDT -----  Contact: Patient [phone]  Requesting to speak with you regarding Rx fluticasone propionate (FLONASE) 50 mcg/actuation nasal spray. Does not understand directions and needs clarification.    Please call and advise.    Thank You

## 2020-08-17 NOTE — TELEPHONE ENCOUNTER
Called and spoke to patient. Patient was informed about instructions on Flonase and Trazodone. Patient says she does not want to take 2 tablets nightly for next 2 weeks since she feels she be a bit much. Pt is asking is it okay to cut tablet in half? Pt also reports of tachycardia at rest; however, her HR has been running in the 50s. She asks if this is concerning at all?     Please adv.

## 2020-08-24 ENCOUNTER — OFFICE VISIT (OUTPATIENT)
Dept: INTERNAL MEDICINE | Facility: CLINIC | Age: 70
End: 2020-08-24
Payer: COMMERCIAL

## 2020-08-24 DIAGNOSIS — G47.00 INSOMNIA, UNSPECIFIED TYPE: Primary | ICD-10-CM

## 2020-08-24 DIAGNOSIS — R00.1 SINUS BRADYCARDIA: ICD-10-CM

## 2020-08-24 DIAGNOSIS — J31.0 CHRONIC RHINITIS: ICD-10-CM

## 2020-08-24 DIAGNOSIS — R29.818 SUSPECTED SLEEP APNEA: ICD-10-CM

## 2020-08-24 PROCEDURE — 99443 PR PHYSICIAN TELEPHONE EVALUATION 21-30 MIN: CPT | Mod: 95,,, | Performed by: INTERNAL MEDICINE

## 2020-08-24 PROCEDURE — 99443 PR PHYSICIAN TELEPHONE EVALUATION 21-30 MIN: ICD-10-PCS | Mod: 95,,, | Performed by: INTERNAL MEDICINE

## 2020-08-24 NOTE — PROGRESS NOTES
Established Patient - Audio Only Telehealth Visit     The patient location is: [facility]  The chief complaint leading to consultation is: medication questions  Visit type: Virtual visit with audio only (telephone)  Total time spent with patient:  Too much 21 minutes       The reason for the audio only service rather than synchronous audio and video virtual visit was related to technical difficulties or patient preference/necessity.     Each patient to whom I provide medical services by telemedicine is:  (1) informed of the relationship between the physician and patient and the respective role of any other health care provider with respect to management of the patient; and (2) notified that they may decline to receive medical services by telemedicine and may withdraw from such care at any time. Patient verbally consented to receive this service via voice-only telephone call.       HPI:   Patient with several medication questions.  She has questions about several of her medications    Started on trazodone 50mg daily, didn't help. I recommended increasing to 100mg and reassessing response. Patient preference to try 75mg nightly but wanted to make sure that was okay    Flonase; wanted some clarification on priming nasal spray    Resting heart rate in the 50s. Denies any symptoms of light headedness, nausea or feeling unwell. She isn't sure if HR increases with activity.     Assessment and plan:     1. Insomnia, unspecified type  Okay to increase trazodone to 75 mg.  Suspect issue will improve once we treat his sleep apnea    2. Suspected sleep apnea  Consult still pending.  .  Will reach out to sleep medicine to see when they will schedule her.    3. Chronic rhinitis  Given instructions on how to use nasal spray correctly.  Recommend start F 2 sprays each side twice a day and then cutting back to 2 sprays each side daily 1 symptoms improved.    4. Sinus bradycardia  Asymptomatic, EKG from July 2020 shows normal  sinus rhythm without blocks.  S patient to walk around her place get her heart rate up and then to recheck.  She will reach back to me with results.                       This service was not originating from a related E/M service provided within the previous 7 days nor will  to an E/M service or procedure within the next 24 hours or my soonest available appointment.  Prevailing standard of care was able to be met in this audio-only visit.

## 2020-08-31 ENCOUNTER — TELEPHONE (OUTPATIENT)
Dept: INTERNAL MEDICINE | Facility: CLINIC | Age: 70
End: 2020-08-31

## 2020-08-31 DIAGNOSIS — I10 HYPERTENSION, UNSPECIFIED TYPE: ICD-10-CM

## 2020-08-31 RX ORDER — TRIAMTERENE AND HYDROCHLOROTHIAZIDE 37.5; 25 MG/1; MG/1
1 CAPSULE ORAL EVERY MORNING
Qty: 90 CAPSULE | Refills: 3 | Status: SHIPPED | OUTPATIENT
Start: 2020-08-31 | End: 2021-01-21

## 2020-08-31 RX ORDER — TRIAMTERENE AND HYDROCHLOROTHIAZIDE 37.5; 25 MG/1; MG/1
1 CAPSULE ORAL EVERY MORNING
Qty: 90 CAPSULE | Refills: 3 | Status: SHIPPED | OUTPATIENT
Start: 2020-08-31 | End: 2020-08-31 | Stop reason: SDUPTHER

## 2020-08-31 NOTE — TELEPHONE ENCOUNTER
----- Message from Flory Burt sent at 8/31/2020  8:24 AM CDT -----  Regarding: Rx  Contact: Patient @ [phone]  Requesting an RX refill or new RX.  Is this a refill or new RX:  Refill  RX name and strength: triamterene-hydrochlorothiazide 37.5-25 mg (DYAZIDE) 37.5-25 mg per capsule  Directions (copy/paste from chart):    Is this a 30 day or 90 day RX:    Local pharmacy or mail order pharmacy:  Local  Pharmacy name and phone # [id number] [place] [facility] [address]   Comments:

## 2020-08-31 NOTE — TELEPHONE ENCOUNTER
No new care gaps identified.  Powered by [employer]. Reference number: [id number]. 8/31/2020 9:11:24 AM RICKYT

## 2020-08-31 NOTE — TELEPHONE ENCOUNTER
----- Message from Alesia Barth sent at 8/31/2020  4:07 PM CDT -----  Contact: Pt-- [phone]  Type:  RX Refill Request     Who Called: Pt     Refill or New Rx: refill     RX Name and Strength: blood pressure medication     Preferred Pharmacy with phone number: [facility] [address] [facility] [address] [facility] [phone] (Phone)   [phone] (Fax)     Would the patient rather a call back or a response via [facility]? Call     Best Call Back Number: [phone]     Additional Information:  Pt states she took her last pill yesterday. She states she had to skip her morning pill because she is out of medication.  Pt very upset. She states this is her 3rd call today. She is requesting a call back as soon as possible.

## 2020-08-31 NOTE — TELEPHONE ENCOUNTER
----- Message from Alesia Barth sent at 8/31/2020  1:03 PM CDT -----  Contact: Pt-- [phone]  Type:  RX Refill Request    Who Called: Pt    Refill or New Rx: refill    RX Name and Strength: blood pressure medication    Preferred Pharmacy with phone number: [facility] [address] [place] [address] [phone] (Phone)  [phone] (Fax)    Would the patient rather a call back or a response via [facility]? Call    Best Call Back Number: [phone]    Additional Information:  Pt states she took her last pill yesterday. She states she had to skip her morning pill because she is out of medication. She is requesting a call back.

## 2020-09-30 ENCOUNTER — PATIENT OUTREACH (OUTPATIENT)
Dept: ADMINISTRATIVE | Facility: HOSPITAL | Age: 70
End: 2020-09-30

## 2020-09-30 DIAGNOSIS — Z12.31 ENCOUNTER FOR SCREENING MAMMOGRAM FOR BREAST CANCER: Primary | ICD-10-CM

## 2020-10-27 ENCOUNTER — TELEPHONE (OUTPATIENT)
Dept: INTERNAL MEDICINE | Facility: CLINIC | Age: 70
End: 2020-10-27

## 2020-10-27 NOTE — TELEPHONE ENCOUNTER
The pt was informed she do not have any up coming scheduled appts with pcp. Offered to schedule a f/u appt pt declined.

## 2020-10-27 NOTE — TELEPHONE ENCOUNTER
----- Message from Renata Espino sent at 10/27/2020  8:13 AM CDT -----  Contact: Patient [phone]  Stated that an appt with you was scheduled for Nov but it is not showing. Request to speak with you.    Please call and advise.    Thank You

## 2020-10-28 ENCOUNTER — TELEPHONE (OUTPATIENT)
Dept: INTERNAL MEDICINE | Facility: CLINIC | Age: 70
End: 2020-10-28

## 2020-10-28 NOTE — TELEPHONE ENCOUNTER
----- Message from Jelena Grimm sent at 10/28/2020  9:54 AM CDT -----  Contact: [phone]  Patient would like a call back about an appointment that she says was scheduled , however it I not in the system. Please call.

## 2020-11-03 ENCOUNTER — HOSPITAL ENCOUNTER (OUTPATIENT)
Dept: RADIOLOGY | Facility: HOSPITAL | Age: 70
Discharge: HOME | End: 2020-11-03
Attending: INTERNAL MEDICINE
Payer: COMMERCIAL

## 2020-11-03 DIAGNOSIS — Z12.31 ENCOUNTER FOR SCREENING MAMMOGRAM FOR BREAST CANCER: ICD-10-CM

## 2020-11-03 PROCEDURE — 77067 SCR MAMMO BI INCL CAD: CPT | Mod: 26,,, | Performed by: RADIOLOGY

## 2020-11-03 PROCEDURE — 77067 SCR MAMMO BI INCL CAD: CPT | Mod: TC

## 2020-11-03 PROCEDURE — 77063 BREAST TOMOSYNTHESIS BI: CPT | Mod: 26,,, | Performed by: RADIOLOGY

## 2020-11-03 PROCEDURE — 77063 MAMMO DIGITAL SCREENING BILAT WITH TOMO: ICD-10-PCS | Mod: 26,,, | Performed by: RADIOLOGY

## 2020-11-03 PROCEDURE — 77067 MAMMO DIGITAL SCREENING BILAT WITH TOMO: ICD-10-PCS | Mod: 26,,, | Performed by: RADIOLOGY

## 2020-11-10 ENCOUNTER — TELEPHONE (OUTPATIENT)
Dept: INTERNAL MEDICINE | Facility: CLINIC | Age: 70
End: 2020-11-10

## 2020-11-10 NOTE — TELEPHONE ENCOUNTER
----- Message from Hanh Rogers sent at 11/10/2020  3:11 PM CST -----  Regarding: Missed Appt  Pt is calling because she says she received a message she missed an appt yesterday.  Pt says she called on more than one occasion and the office and was told she did not have an appt and is upset that now there was one.  She is requesting Staff return the call for discussion.    She can be reached at [phone].    Thank you.

## 2020-11-10 NOTE — TELEPHONE ENCOUNTER
Please call patient and inform that Dr. Chi is out of office the remainder of the week as will be working in the hospital. I reviewed her mammogram that she had performed on 11/3 and was negative and normal and can repeat in 1 year for screening.

## 2020-11-30 ENCOUNTER — HOSPITAL ENCOUNTER (EMERGENCY)
Facility: HOSPITAL | Age: 70
Discharge: HOME | End: 2020-11-30
Attending: EMERGENCY MEDICINE
Payer: COMMERCIAL

## 2020-11-30 VITALS
TEMPERATURE: 98 F | BODY MASS INDEX: 47.84 KG/M2 | SYSTOLIC BLOOD PRESSURE: 124 MMHG | OXYGEN SATURATION: 97 % | WEIGHT: 260 LBS | HEIGHT: 62 IN | DIASTOLIC BLOOD PRESSURE: 60 MMHG | HEART RATE: 51 BPM | RESPIRATION RATE: 20 BRPM

## 2020-11-30 DIAGNOSIS — R07.9 CHEST PAIN: ICD-10-CM

## 2020-11-30 DIAGNOSIS — E87.6 HYPOKALEMIA: ICD-10-CM

## 2020-11-30 DIAGNOSIS — J40 BRONCHITIS: ICD-10-CM

## 2020-11-30 DIAGNOSIS — Z20.822 SUSPECTED COVID-19 VIRUS INFECTION: ICD-10-CM

## 2020-11-30 DIAGNOSIS — N39.0 ACUTE UTI: Primary | ICD-10-CM

## 2020-11-30 LAB
ALBUMIN SERPL-MCNC: 4 G/DL (ref 3.3–5.5)
ALP SERPL-CCNC: 95 U/L (ref 42–141)
BILIRUB SERPL-MCNC: 0.7 MG/DL (ref 0.2–1.6)
BILIRUBIN, POC UA: NEGATIVE
BLOOD, POC UA: ABNORMAL
BUN SERPL-MCNC: 13 MG/DL (ref 7–22)
CALCIUM SERPL-MCNC: 9.6 MG/DL (ref 8–10.3)
CHLORIDE SERPL-SCNC: 101 MMOL/L (ref 98–108)
CLARITY, POC UA: CLEAR
COLOR, POC UA: YELLOW
CREAT SERPL-MCNC: 0.9 MG/DL (ref 0.6–1.2)
CTP QC/QA: YES
GLUCOSE SERPL-MCNC: 93 MG/DL (ref 73–118)
GLUCOSE, POC UA: NEGATIVE
KETONES, POC UA: NEGATIVE
LEUKOCYTE EST, POC UA: ABNORMAL
NITRITE, POC UA: NEGATIVE
PH UR STRIP: 7.5 [PH]
POC ALT (SGPT): 18 U/L (ref 10–47)
POC AST (SGOT): 24 U/L (ref 11–38)
POC B-TYPE NATRIURETIC PEPTIDE: 95.7 PG/ML (ref 0–100)
POC CARDIAC TROPONIN I: 0 NG/ML
POC PTINR: 1.1 (ref 0.9–1.2)
POC PTWBT: 13.2 SEC (ref 9.7–14.3)
POC TCO2: 30 MMOL/L (ref 18–33)
POTASSIUM BLD-SCNC: 3.3 MMOL/L (ref 3.6–5.1)
PROTEIN, POC UA: NEGATIVE
PROTEIN, POC: 8.2 G/DL (ref 6.4–8.1)
SAMPLE: NORMAL
SAMPLE: NORMAL
SARS-COV-2 RDRP RESP QL NAA+PROBE: NEGATIVE
SODIUM BLD-SCNC: 143 MMOL/L (ref 128–145)
SPECIFIC GRAVITY, POC UA: 1.02
UROBILINOGEN, POC UA: 0.2 E.U./DL

## 2020-11-30 PROCEDURE — U0003 INFECTIOUS AGENT DETECTION BY NUCLEIC ACID (DNA OR RNA); SEVERE ACUTE RESPIRATORY SYNDROME CORONAVIRUS 2 (SARS-COV-2) (CORONAVIRUS DISEASE [COVID-19]), AMPLIFIED PROBE TECHNIQUE, MAKING USE OF HIGH THROUGHPUT TECHNOLOGIES AS DESCRIBED BY CMS-2020-01-R: HCPCS

## 2020-11-30 PROCEDURE — 80053 COMPREHEN METABOLIC PANEL: CPT | Mod: ER

## 2020-11-30 PROCEDURE — 25000003 PHARM REV CODE 250: Mod: ER | Performed by: EMERGENCY MEDICINE

## 2020-11-30 PROCEDURE — 85610 PROTHROMBIN TIME: CPT | Mod: ER

## 2020-11-30 PROCEDURE — 84484 ASSAY OF TROPONIN QUANT: CPT | Mod: ER

## 2020-11-30 PROCEDURE — 85025 COMPLETE CBC W/AUTO DIFF WBC: CPT | Mod: ER

## 2020-11-30 PROCEDURE — 96365 THER/PROPH/DIAG IV INF INIT: CPT | Mod: ER,59

## 2020-11-30 PROCEDURE — 25000242 PHARM REV CODE 250 ALT 637 W/ HCPCS: Mod: ER | Performed by: EMERGENCY MEDICINE

## 2020-11-30 PROCEDURE — 25500020 PHARM REV CODE 255: Mod: ER | Performed by: EMERGENCY MEDICINE

## 2020-11-30 PROCEDURE — 83880 ASSAY OF NATRIURETIC PEPTIDE: CPT | Mod: ER

## 2020-11-30 PROCEDURE — 87086 URINE CULTURE/COLONY COUNT: CPT

## 2020-11-30 PROCEDURE — 96376 TX/PRO/DX INJ SAME DRUG ADON: CPT | Mod: ER

## 2020-11-30 PROCEDURE — 81003 URINALYSIS AUTO W/O SCOPE: CPT | Mod: ER

## 2020-11-30 PROCEDURE — 96375 TX/PRO/DX INJ NEW DRUG ADDON: CPT | Mod: ER,59

## 2020-11-30 PROCEDURE — 99291 CRITICAL CARE FIRST HOUR: CPT | Mod: 25,ER

## 2020-11-30 PROCEDURE — 63600175 PHARM REV CODE 636 W HCPCS: Mod: ER | Performed by: EMERGENCY MEDICINE

## 2020-11-30 PROCEDURE — U0002 COVID-19 LAB TEST NON-CDC: HCPCS | Mod: ER | Performed by: EMERGENCY MEDICINE

## 2020-11-30 RX ORDER — METHYLPREDNISOLONE SOD SUCC 125 MG
125 VIAL (EA) INJECTION
Status: COMPLETED | OUTPATIENT
Start: 2020-11-30 | End: 2020-11-30

## 2020-11-30 RX ORDER — ACETAMINOPHEN 500 MG
1000 TABLET ORAL EVERY 6 HOURS PRN
Qty: 30 TABLET | Refills: 0 | Status: SHIPPED | OUTPATIENT
Start: 2020-11-30 | End: 2021-01-21

## 2020-11-30 RX ORDER — ONDANSETRON 8 MG/1
8 TABLET, ORALLY DISINTEGRATING ORAL EVERY 6 HOURS PRN
Qty: 15 TABLET | Refills: 0 | Status: SHIPPED | OUTPATIENT
Start: 2020-11-30 | End: 2020-12-02

## 2020-11-30 RX ORDER — DIPHENHYDRAMINE HYDROCHLORIDE 50 MG/ML
50 INJECTION INTRAMUSCULAR; INTRAVENOUS
Status: DISCONTINUED | OUTPATIENT
Start: 2020-11-30 | End: 2020-11-30

## 2020-11-30 RX ORDER — NITROGLYCERIN 0.4 MG/1
0.4 TABLET SUBLINGUAL EVERY 5 MIN PRN
Status: DISCONTINUED | OUTPATIENT
Start: 2020-11-30 | End: 2020-11-30 | Stop reason: HOSPADM

## 2020-11-30 RX ORDER — DIPHENHYDRAMINE HYDROCHLORIDE 50 MG/ML
25 INJECTION INTRAMUSCULAR; INTRAVENOUS
Status: COMPLETED | OUTPATIENT
Start: 2020-11-30 | End: 2020-11-30

## 2020-11-30 RX ORDER — ASPIRIN 325 MG
325 TABLET ORAL
Status: COMPLETED | OUTPATIENT
Start: 2020-11-30 | End: 2020-11-30

## 2020-11-30 RX ORDER — FLUTICASONE PROPIONATE 50 MCG
1 SPRAY, SUSPENSION (ML) NASAL 2 TIMES DAILY
Qty: 16 G | Refills: 0 | Status: SHIPPED | OUTPATIENT
Start: 2020-11-30 | End: 2021-06-18 | Stop reason: SDUPTHER

## 2020-11-30 RX ORDER — FAMOTIDINE 10 MG/ML
20 INJECTION INTRAVENOUS
Status: COMPLETED | OUTPATIENT
Start: 2020-11-30 | End: 2020-11-30

## 2020-11-30 RX ORDER — FAMOTIDINE 10 MG/ML
20 INJECTION INTRAVENOUS
Status: DISCONTINUED | OUTPATIENT
Start: 2020-11-30 | End: 2020-11-30

## 2020-11-30 RX ORDER — POTASSIUM CHLORIDE 20 MEQ/1
40 TABLET, EXTENDED RELEASE ORAL
Status: COMPLETED | OUTPATIENT
Start: 2020-11-30 | End: 2020-11-30

## 2020-11-30 RX ORDER — ACETAMINOPHEN 325 MG/1
650 TABLET ORAL
Status: COMPLETED | OUTPATIENT
Start: 2020-11-30 | End: 2020-11-30

## 2020-11-30 RX ORDER — BENZONATATE 200 MG/1
200 CAPSULE ORAL 3 TIMES DAILY PRN
Qty: 20 CAPSULE | Refills: 0 | Status: SHIPPED | OUTPATIENT
Start: 2020-11-30 | End: 2020-12-10

## 2020-11-30 RX ORDER — AMOXICILLIN AND CLAVULANATE POTASSIUM 875; 125 MG/1; MG/1
1 TABLET, FILM COATED ORAL 2 TIMES DAILY
Qty: 14 TABLET | Refills: 0 | Status: SHIPPED | OUTPATIENT
Start: 2020-11-30 | End: 2020-12-07

## 2020-11-30 RX ORDER — ALBUTEROL SULFATE 90 UG/1
2 AEROSOL, METERED RESPIRATORY (INHALATION) EVERY 6 HOURS PRN
Qty: 18 G | Refills: 0 | Status: SHIPPED | OUTPATIENT
Start: 2020-11-30 | End: 2021-06-18 | Stop reason: SDUPTHER

## 2020-11-30 RX ADMIN — POTASSIUM CHLORIDE 40 MEQ: 1500 TABLET, EXTENDED RELEASE ORAL at 01:11

## 2020-11-30 RX ADMIN — NITROGLYCERIN 0.4 MG: 0.4 TABLET SUBLINGUAL at 12:11

## 2020-11-30 RX ADMIN — NITROGLYCERIN 0.4 MG: 0.4 TABLET SUBLINGUAL at 01:11

## 2020-11-30 RX ADMIN — CEFTRIAXONE 1 G: 1 INJECTION, SOLUTION INTRAVENOUS at 04:11

## 2020-11-30 RX ADMIN — DIPHENHYDRAMINE HYDROCHLORIDE 25 MG: 50 INJECTION INTRAMUSCULAR; INTRAVENOUS at 12:11

## 2020-11-30 RX ADMIN — ASPIRIN 325 MG ORAL TABLET 325 MG: 325 PILL ORAL at 12:11

## 2020-11-30 RX ADMIN — METHYLPREDNISOLONE SODIUM SUCCINATE 125 MG: 125 INJECTION, POWDER, FOR SOLUTION INTRAMUSCULAR; INTRAVENOUS at 02:11

## 2020-11-30 RX ADMIN — ACETAMINOPHEN 650 MG: 325 TABLET ORAL at 01:11

## 2020-11-30 RX ADMIN — IOHEXOL 100 ML: 350 INJECTION, SOLUTION INTRAVENOUS at 03:11

## 2020-11-30 RX ADMIN — LIDOCAINE HYDROCHLORIDE: 20 SOLUTION ORAL; TOPICAL at 01:11

## 2020-11-30 RX ADMIN — DIPHENHYDRAMINE HYDROCHLORIDE 25 MG: 50 INJECTION INTRAMUSCULAR; INTRAVENOUS at 02:11

## 2020-11-30 RX ADMIN — FAMOTIDINE 20 MG: 10 INJECTION INTRAVENOUS at 12:11
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
## 2020-11-30 NOTE — Clinical Note
"Ni "Ivana Montejo was seen and treated in our emergency department on 11/30/2020.     COVID-19 is present in our communities across the state. There is limited testing for COVID at this time, so not all patients can be tested. In this situation, your employee meets the following criteria:    Ni Montejo has met the criteria for COVID-19 testing based upon symptoms, travel, and/or potential exposure. The test has been completed and is pending results at this time. During this time the employee is not able to work and should be quarantined per the Centers for Disease Control timelines.     If you have any questions or concerns, or if I can be of further assistance, please do not hesitate to contact me.    Sincerely,             Hemalatha Ohara, DO"

## 2020-12-02 ENCOUNTER — TELEPHONE (OUTPATIENT)
Dept: EMERGENCY MEDICINE | Facility: HOSPITAL | Age: 70
End: 2020-12-02

## 2020-12-02 LAB
BACTERIA UR CULT: NORMAL
SARS-COV-2 RNA RESP QL NAA+PROBE: NOT DETECTED

## 2020-12-13 DIAGNOSIS — J30.2 SEASONAL ALLERGIES: ICD-10-CM

## 2020-12-14 NOTE — TELEPHONE ENCOUNTER
Please see the following assessment. This refill request still requires some action on your part. Patient reports being out of Hydroxyzine, which is outside of [facility] protocol. Also, patient visited ED 11/20 for UTI. Defer to you. Thank you.

## 2020-12-14 NOTE — TELEPHONE ENCOUNTER
----- Message from Yolande Gonzales sent at 12/14/2020  9:22 AM CST -----  Contact: [phone]  Patient called to follow up on the refill request for hydrOXYzine HCL (ATARAX) 25 MG tablet. Patient is completely out of this Rx. Please call and advise

## 2020-12-14 NOTE — PROGRESS NOTES
Refill Routing Note   Medication(s) are not appropriate for processing by [facility] for the following reason(s):     - Outside of Protocol  [facility] action(s):   Route          Medication reconciliation completed: No   Automatic Epic Generated Protocol Data:        Requested Prescriptions   Pending Prescriptions Disp Refills    hydrOXYzine HCL (ATARAX) 25 MG tablet [Pharmacy Med Name: hydrOXYzine HCl 25 MG Oral Tablet] 60 tablet 0     Sig: Take 1 tablet by mouth 4 times daily       There is no refill protocol information for this order           Appointments  past 12m or future 3m with PCP    Date Provider   Last Visit   8/24/2020 Giovanni Chi MD   Next Visit   12/21/2020 Giovanni Chi MD   ED visits in past 90 days: 1        Note composed:9:08 PM 12/13/2020

## 2020-12-15 RX ORDER — HYDROXYZINE HYDROCHLORIDE 25 MG/1
TABLET, FILM COATED ORAL
Qty: 60 TABLET | Refills: 0 | Status: SHIPPED | OUTPATIENT
Start: 2020-12-15 | End: 2020-12-21 | Stop reason: SDUPTHER

## 2020-12-21 ENCOUNTER — OFFICE VISIT (OUTPATIENT)
Dept: INTERNAL MEDICINE | Facility: CLINIC | Age: 70
End: 2020-12-21
Payer: COMMERCIAL

## 2020-12-21 VITALS
HEART RATE: 57 BPM | HEIGHT: 62 IN | OXYGEN SATURATION: 97 % | DIASTOLIC BLOOD PRESSURE: 82 MMHG | BODY MASS INDEX: 47.55 KG/M2 | SYSTOLIC BLOOD PRESSURE: 126 MMHG

## 2020-12-21 DIAGNOSIS — K21.9 GASTROESOPHAGEAL REFLUX DISEASE WITHOUT ESOPHAGITIS: ICD-10-CM

## 2020-12-21 DIAGNOSIS — J30.2 SEASONAL ALLERGIES: ICD-10-CM

## 2020-12-21 DIAGNOSIS — R60.0 LOCALIZED EDEMA: ICD-10-CM

## 2020-12-21 DIAGNOSIS — I10 HYPERTENSION, UNSPECIFIED TYPE: ICD-10-CM

## 2020-12-21 DIAGNOSIS — R29.818 SUSPECTED SLEEP APNEA: ICD-10-CM

## 2020-12-21 DIAGNOSIS — R07.89 ATYPICAL CHEST PAIN: Primary | ICD-10-CM

## 2020-12-21 PROCEDURE — 99214 OFFICE O/P EST MOD 30 MIN: CPT | Mod: S$GLB,,, | Performed by: INTERNAL MEDICINE

## 2020-12-21 PROCEDURE — 99214 PR OFFICE/OUTPT VISIT, EST, LEVL IV, 30-39 MIN: ICD-10-PCS | Mod: S$GLB,,, | Performed by: INTERNAL MEDICINE

## 2020-12-21 PROCEDURE — 99999 PR PBB SHADOW E&M-EST. PATIENT-LVL III: ICD-10-PCS | Mod: PBBFAC,,, | Performed by: INTERNAL MEDICINE

## 2020-12-21 PROCEDURE — 99999 PR PBB SHADOW E&M-EST. PATIENT-LVL III: CPT | Mod: PBBFAC,,, | Performed by: INTERNAL MEDICINE

## 2020-12-21 RX ORDER — OMEPRAZOLE 40 MG/1
40 CAPSULE, DELAYED RELEASE ORAL DAILY
Qty: 30 CAPSULE | Refills: 3 | Status: SHIPPED | OUTPATIENT
Start: 2020-12-21 | End: 2020-12-21

## 2020-12-21 RX ORDER — LISINOPRIL 20 MG/1
20 TABLET ORAL DAILY
Qty: 90 TABLET | Refills: 3 | Status: SHIPPED | OUTPATIENT
Start: 2020-12-21 | End: 2020-12-21

## 2020-12-21 RX ORDER — LISINOPRIL 20 MG/1
20 TABLET ORAL DAILY
Qty: 90 TABLET | Refills: 3 | Status: SHIPPED | OUTPATIENT
Start: 2020-12-21 | End: 2021-03-18 | Stop reason: SDUPTHER

## 2020-12-21 RX ORDER — HYDROXYZINE HYDROCHLORIDE 25 MG/1
25 TABLET, FILM COATED ORAL 4 TIMES DAILY PRN
Qty: 60 TABLET | Refills: 0 | Status: SHIPPED | OUTPATIENT
Start: 2020-12-21 | End: 2020-12-21

## 2020-12-21 RX ORDER — OMEPRAZOLE 40 MG/1
40 CAPSULE, DELAYED RELEASE ORAL DAILY
Qty: 90 CAPSULE | Refills: 3 | Status: SHIPPED | OUTPATIENT
Start: 2020-12-21 | End: 2021-03-18 | Stop reason: SDUPTHER

## 2020-12-21 RX ORDER — HYDROXYZINE HYDROCHLORIDE 25 MG/1
25 TABLET, FILM COATED ORAL 4 TIMES DAILY PRN
Qty: 270 TABLET | Refills: 3 | Status: SHIPPED | OUTPATIENT
Start: 2020-12-21 | End: 2021-03-18 | Stop reason: SDUPTHER

## 2020-12-21 NOTE — PROGRESS NOTES
"    CHIEF COMPLAINT     Chief Complaint   Patient presents with    Follow-up       HPI     Ni Montejo is a 70 y.o. female here today for     Seen in ED 11/30 for CP.  Coughing at time. Troponin negative.  Chest pain 90% better.  Reports still having some shortness of breath with activity that resolves with rest.  Reports that she previously had a stress test a couple years ago that was normal.  Reports she is not able to walk on treadmill for exercise stress      Suspected sleep apnea  Not done since last visit.  Patient reports never heard back from sleep clinic.    HTN  Lisinopril 20, try Dyazide 35-25.  Blood pressure controlled.  No symptoms of hyper hypotension  Risk factors high BMI and suspected sleep apnea  Personally Reviewed Patient's Medical, surgical, family and social hx. Changes updated in [employer].  Care Team updated in Epic    Review of Systems:  Review of Systems   Respiratory: Positive for shortness of breath.    Cardiovascular: Positive for leg swelling.   Psychiatric/Behavioral: Positive for sleep disturbance.   All other systems reviewed and are negative.      Health Maintenance:   Reviewed with patient  Due for the following:  Immunizations declined    PHYSICAL EXAM     /82 (BP Location: Left arm, Patient Position: Sitting, BP Method: Large (Manual))   Pulse (!) 57   Ht 5' 2" (1.575 m)   LMP  (LMP Unknown)   SpO2 97%   BMI 47.55 kg/m²     Gen: Well Appearing, NAD  HEENT: PERR, EOMI  Neck: FROM, no thyromegaly, no cervical adenopathy  CVD: RRR, no M/R/G  Pulm: Normal work of breathing, CTAB, no wheezing  Abd:  Soft, NT, ND non TTP, no mass  MSK:  Right lower extremity chronically edematous  Neuro: A&Ox3, gait normal, speech normal  Mood; Mood normal, behavior normal, thought process linear       LABS     Labs reviewed; Notable for  Lab Results   Component Value Date    CREATININE 0.7 08/04/2014    BUN 13 08/04/2014     08/04/2014    K 3.4 (L) 08/04/2014     08/04/2014    " CO2 27 08/04/2014     CT PE chest  Impression:     No evidence of pulmonary embolism.     Enlargement of the right main and left main pulmonary arteries.     1.0 x 2.4 cm right hilar lymph node.     Ground-glass airspace opacity in the right lower lobe.     Areas of subsegmental is in the bilateral lung bases.     ASSESSMENT     1. Atypical chest pain  Nuclear Stress - Cardiology Interpreted    DISCONTINUED: lisinopriL (PRINIVIL,ZESTRIL) 20 MG tablet   2. Hypertension, unspecified type  lisinopriL (PRINIVIL,ZESTRIL) 20 MG tablet   3. Gastroesophageal reflux disease without esophagitis  omeprazole (PRILOSEC) 40 MG capsule    DISCONTINUED: omeprazole (PRILOSEC) 40 MG capsule   4. Seasonal allergies  hydrOXYzine HCL (ATARAX) 25 MG tablet    DISCONTINUED: hydrOXYzine HCL (ATARAX) 25 MG tablet   5. Suspected sleep apnea     6. Localized edema             Plan     Ni Montejo is a 70 y.o. female with  1. Atypical chest pain  Suspect chest pain is noncardiac however does report some dyspnea on exertion and improved with rest.  Will do chemical stress  - Nuclear Stress - Cardiology Interpreted; Future    2. Hypertension, unspecified type  Continue lisinopril Dyazide combination  - lisinopriL (PRINIVIL,ZESTRIL) 20 MG tablet; Take 1 tablet (20 mg total) by mouth once daily.  Dispense: 90 tablet; Refill: 3    3. Gastroesophageal reflux disease without esophagitis  - omeprazole (PRILOSEC) 40 MG capsule; Take 1 capsule (40 mg total) by mouth once daily.  Dispense: 90 capsule; Refill: 3    4. Seasonal allergies  - hydrOXYzine HCL (ATARAX) 25 MG tablet; Take 1 tablet (25 mg total) by mouth 4 (four) times daily as needed for Itching.  Dispense: 270 tablet; Refill: 3    5. Suspected sleep apnea  Will reach back out to sleep    6. Localized edema  Etiology suspected destruction of lymph system from previous ankle surgery, obesity and suspected sleep apnea probably contributing as well.  Continue monitor    Giovanni Chi MD

## 2020-12-29 ENCOUNTER — OFFICE VISIT (OUTPATIENT)
Dept: INTERNAL MEDICINE | Facility: CLINIC | Age: 70
End: 2020-12-29
Payer: COMMERCIAL

## 2020-12-29 DIAGNOSIS — H00.015 HORDEOLUM EXTERNUM OF LEFT LOWER EYELID: Primary | ICD-10-CM

## 2020-12-29 PROCEDURE — 99442 PR PHYSICIAN TELEPHONE EVALUATION 11-20 MIN: ICD-10-PCS | Mod: 95,,, | Performed by: INTERNAL MEDICINE

## 2020-12-29 PROCEDURE — 99442 PR PHYSICIAN TELEPHONE EVALUATION 11-20 MIN: CPT | Mod: 95,,, | Performed by: INTERNAL MEDICINE

## 2020-12-29 RX ORDER — ERYTHROMYCIN 5 MG/G
OINTMENT OPHTHALMIC NIGHTLY
Qty: 3.5 G | Refills: 0 | Status: SHIPPED | OUTPATIENT
Start: 2020-12-29 | End: 2021-03-01

## 2020-12-29 NOTE — PROGRESS NOTES
Established Patient - Audio Only Telehealth Visit     The patient location is: [place]  The chief complaint leading to consultation is: stye  Visit type: Virtual visit with audio only (telephone)  Total time spent with patient: 11 minute       The reason for the audio only service rather than synchronous audio and video virtual visit was related to technical difficulties or patient preference/necessity.     Each patient to whom I provide medical services by telemedicine is:  (1) informed of the relationship between the physician and patient and the respective role of any other health care provider with respect to management of the patient; and (2) notified that they may decline to receive medical services by telemedicine and may withdraw from such care at any time. Patient verbally consented to receive this service via voice-only telephone call.       HPI:     Patient reports stye left lower eyelid.  Reports started couple days ago.  She is so-so onset of lesion with a little bit hand  that she got to her eye.  She r rinse her eye.  No vision change is no photosensitivity no irritation.  Reports still having start time.  No fever no pain with ocular motion no vision changes.     Assessment and plan:   1. Hordeolum externum of left lower eyelid  Warm compress  - erythromycin (ROMYCIN) ophthalmic ointment; Place into the left eye every evening.  Dispense: 3.5 g; Refill: 0                       This service was not originating from a related E/M service provided within the previous 7 days nor will  to an E/M service or procedure within the next 24 hours or my soonest available appointment.  Prevailing standard of care was able to be met in this audio-only visit.

## 2021-01-13 ENCOUNTER — TELEPHONE (OUTPATIENT)
Dept: CARDIOLOGY | Facility: CLINIC | Age: 71
End: 2021-01-13

## 2021-01-15 ENCOUNTER — HOSPITAL ENCOUNTER (OUTPATIENT)
Dept: CARDIOLOGY | Facility: HOSPITAL | Age: 71
Discharge: HOME | End: 2021-01-15
Attending: INTERNAL MEDICINE
Payer: MEDICARE

## 2021-01-15 DIAGNOSIS — R07.89 ATYPICAL CHEST PAIN: ICD-10-CM

## 2021-01-15 PROCEDURE — 93018 CV STRESS TEST I&R ONLY: CPT | Mod: ,,, | Performed by: INTERNAL MEDICINE

## 2021-01-15 PROCEDURE — 93017 CV STRESS TEST TRACING ONLY: CPT

## 2021-01-15 PROCEDURE — 93018 STRESS TEST WITH MYOCARDIAL PERFUSION (CUPID ONLY): ICD-10-PCS | Mod: ,,, | Performed by: INTERNAL MEDICINE

## 2021-01-15 PROCEDURE — A9502 TC99M TETROFOSMIN: HCPCS

## 2021-01-15 PROCEDURE — 78452 HT MUSCLE IMAGE SPECT MULT: CPT | Mod: 26,,, | Performed by: INTERNAL MEDICINE

## 2021-01-15 PROCEDURE — 63600175 PHARM REV CODE 636 W HCPCS: Mod: HCNC | Performed by: INTERNAL MEDICINE

## 2021-01-15 PROCEDURE — 93016 STRESS TEST WITH MYOCARDIAL PERFUSION (CUPID ONLY): ICD-10-PCS | Mod: ,,, | Performed by: INTERNAL MEDICINE

## 2021-01-15 PROCEDURE — 78452 STRESS TEST WITH MYOCARDIAL PERFUSION (CUPID ONLY): ICD-10-PCS | Mod: 26,,, | Performed by: INTERNAL MEDICINE

## 2021-01-15 PROCEDURE — 93016 CV STRESS TEST SUPVJ ONLY: CPT | Mod: ,,, | Performed by: INTERNAL MEDICINE

## 2021-01-15 RX ORDER — REGADENOSON 0.08 MG/ML
0.4 INJECTION, SOLUTION INTRAVENOUS ONCE
Status: COMPLETED | OUTPATIENT
Start: 2021-01-15 | End: 2021-01-15

## 2021-01-15 RX ADMIN — REGADENOSON 0.4 MG: 0.08 INJECTION, SOLUTION INTRAVENOUS at 07:01

## 2021-01-21 ENCOUNTER — OFFICE VISIT (OUTPATIENT)
Dept: SLEEP MEDICINE | Facility: CLINIC | Age: 71
End: 2021-01-21
Payer: MEDICARE

## 2021-01-21 VITALS
WEIGHT: 259.94 LBS | SYSTOLIC BLOOD PRESSURE: 125 MMHG | HEART RATE: 59 BPM | HEIGHT: 62 IN | DIASTOLIC BLOOD PRESSURE: 68 MMHG | BODY MASS INDEX: 47.84 KG/M2

## 2021-01-21 DIAGNOSIS — R41.3 MEMORY CHANGES: ICD-10-CM

## 2021-01-21 DIAGNOSIS — I10 ESSENTIAL HYPERTENSION: ICD-10-CM

## 2021-01-21 DIAGNOSIS — G60.9 IDIOPATHIC PERIPHERAL NEUROPATHY: ICD-10-CM

## 2021-01-21 DIAGNOSIS — R41.840 ATTENTION AND CONCENTRATION DEFICIT: ICD-10-CM

## 2021-01-21 DIAGNOSIS — G47.01 INSOMNIA DUE TO MEDICAL CONDITION: ICD-10-CM

## 2021-01-21 DIAGNOSIS — R06.83 SNORING: ICD-10-CM

## 2021-01-21 DIAGNOSIS — G47.00 INSOMNIA, UNSPECIFIED TYPE: ICD-10-CM

## 2021-01-21 DIAGNOSIS — K21.9 GASTROESOPHAGEAL REFLUX DISEASE, UNSPECIFIED WHETHER ESOPHAGITIS PRESENT: ICD-10-CM

## 2021-01-21 DIAGNOSIS — G47.39 OTHER SLEEP APNEA: ICD-10-CM

## 2021-01-21 PROCEDURE — 3074F SYST BP LT 130 MM HG: CPT | Mod: HCNC,CPTII,S$GLB, | Performed by: INTERNAL MEDICINE

## 2021-01-21 PROCEDURE — 3008F PR BODY MASS INDEX (BMI) DOCUMENTED: ICD-10-PCS | Mod: HCNC,CPTII,S$GLB, | Performed by: INTERNAL MEDICINE

## 2021-01-21 PROCEDURE — 3008F BODY MASS INDEX DOCD: CPT | Mod: HCNC,CPTII,S$GLB, | Performed by: INTERNAL MEDICINE

## 2021-01-21 PROCEDURE — 1159F PR MEDICATION LIST DOCUMENTED IN MEDICAL RECORD: ICD-10-PCS | Mod: HCNC,S$GLB,, | Performed by: INTERNAL MEDICINE

## 2021-01-21 PROCEDURE — 3078F PR MOST RECENT DIASTOLIC BLOOD PRESSURE < 80 MM HG: ICD-10-PCS | Mod: HCNC,CPTII,S$GLB, | Performed by: INTERNAL MEDICINE

## 2021-01-21 PROCEDURE — 99999 PR PBB SHADOW E&M-EST. PATIENT-LVL III: CPT | Mod: PBBFAC,HCNC,, | Performed by: INTERNAL MEDICINE

## 2021-01-21 PROCEDURE — 3078F DIAST BP <80 MM HG: CPT | Mod: HCNC,CPTII,S$GLB, | Performed by: INTERNAL MEDICINE

## 2021-01-21 PROCEDURE — 3074F PR MOST RECENT SYSTOLIC BLOOD PRESSURE < 130 MM HG: ICD-10-PCS | Mod: HCNC,CPTII,S$GLB, | Performed by: INTERNAL MEDICINE

## 2021-01-21 PROCEDURE — 99499 UNLISTED E&M SERVICE: CPT | Mod: S$PBB,,, | Performed by: INTERNAL MEDICINE

## 2021-01-21 PROCEDURE — 1126F AMNT PAIN NOTED NONE PRSNT: CPT | Mod: HCNC,S$GLB,, | Performed by: INTERNAL MEDICINE

## 2021-01-21 PROCEDURE — 99202 OFFICE O/P NEW SF 15 MIN: CPT | Mod: HCNC,S$GLB,, | Performed by: INTERNAL MEDICINE

## 2021-01-21 PROCEDURE — 99202 PR OFFICE/OUTPT VISIT, NEW, LEVL II, 15-29 MIN: ICD-10-PCS | Mod: HCNC,S$GLB,, | Performed by: INTERNAL MEDICINE

## 2021-01-21 PROCEDURE — 99499 RISK ADDL DX/OHS AUDIT: ICD-10-PCS | Mod: S$PBB,,, | Performed by: INTERNAL MEDICINE

## 2021-01-21 PROCEDURE — 1126F PR PAIN SEVERITY QUANTIFIED, NO PAIN PRESENT: ICD-10-PCS | Mod: HCNC,S$GLB,, | Performed by: INTERNAL MEDICINE

## 2021-01-21 PROCEDURE — 1159F MED LIST DOCD IN RCRD: CPT | Mod: HCNC,S$GLB,, | Performed by: INTERNAL MEDICINE

## 2021-01-21 PROCEDURE — 99999 PR PBB SHADOW E&M-EST. PATIENT-LVL III: ICD-10-PCS | Mod: PBBFAC,HCNC,, | Performed by: INTERNAL MEDICINE

## 2021-01-26 ENCOUNTER — TELEPHONE (OUTPATIENT)
Dept: SLEEP MEDICINE | Facility: HOSPITAL | Age: 71
End: 2021-01-26

## 2021-01-28 ENCOUNTER — HOSPITAL ENCOUNTER (OUTPATIENT)
Dept: CARDIOLOGY | Facility: HOSPITAL | Age: 71
Discharge: HOME | End: 2021-01-28
Attending: INTERNAL MEDICINE
Payer: MEDICARE

## 2021-01-28 LAB
CV PHARM DOSE: 0.4 MG
CV STRESS BASE HR: 54 BPM
DIASTOLIC BLOOD PRESSURE: 59 MMHG
END DIASTOLIC INDEX-HIGH: 170 ML/M2
END SYSTOLIC INDEX-HIGH: 70 ML/M2
OHS CV CPX 85 PERCENT MAX PREDICTED HEART RATE MALE: 124
OHS CV CPX MAX PREDICTED HEART RATE: 146
OHS CV CPX PATIENT IS FEMALE: 1
OHS CV CPX PATIENT IS MALE: 0
OHS CV CPX PEAK DIASTOLIC BLOOD PRESSURE: 59 MMHG
OHS CV CPX PEAK HEAR RATE: 71 BPM
OHS CV CPX PEAK RATE PRESSURE PRODUCT: NORMAL
OHS CV CPX PEAK SYSTOLIC BLOOD PRESSURE: 159 MMHG
OHS CV CPX PERCENT MAX PREDICTED HEART RATE ACHIEVED: 49
OHS CV CPX RATE PRESSURE PRODUCT PRESENTING: 8586
RETIRED EF AND QEF - SEE NOTES: 51 %
SYSTOLIC BLOOD PRESSURE: 159 MMHG

## 2021-02-04 ENCOUNTER — TELEPHONE (OUTPATIENT)
Dept: INTERNAL MEDICINE | Facility: CLINIC | Age: 71
End: 2021-02-04

## 2021-02-05 ENCOUNTER — TELEPHONE (OUTPATIENT)
Dept: INTERNAL MEDICINE | Facility: CLINIC | Age: 71
End: 2021-02-05

## 2021-02-11 ENCOUNTER — TELEPHONE (OUTPATIENT)
Dept: CARDIOLOGY | Facility: CLINIC | Age: 71
End: 2021-02-11

## 2021-02-11 DIAGNOSIS — R07.89 ATYPICAL CHEST PAIN: ICD-10-CM

## 2021-02-11 DIAGNOSIS — I48.0 PAROXYSMAL ATRIAL FIBRILLATION: Primary | ICD-10-CM

## 2021-02-11 DIAGNOSIS — R01.1 CARDIAC MURMUR: ICD-10-CM

## 2021-02-11 DIAGNOSIS — I10 ESSENTIAL HYPERTENSION: ICD-10-CM

## 2021-02-11 DIAGNOSIS — E78.2 MIXED HYPERLIPIDEMIA: ICD-10-CM

## 2021-02-12 ENCOUNTER — TELEPHONE (OUTPATIENT)
Dept: CARDIOLOGY | Facility: CLINIC | Age: 71
End: 2021-02-12

## 2021-02-12 DIAGNOSIS — E78.2 MIXED HYPERLIPIDEMIA: ICD-10-CM

## 2021-02-12 DIAGNOSIS — R07.89 ATYPICAL CHEST PAIN: Primary | ICD-10-CM

## 2021-02-15 ENCOUNTER — TELEPHONE (OUTPATIENT)
Dept: CARDIOLOGY | Facility: CLINIC | Age: 71
End: 2021-02-15

## 2021-02-17 ENCOUNTER — TELEPHONE (OUTPATIENT)
Dept: CARDIOLOGY | Facility: CLINIC | Age: 71
End: 2021-02-17

## 2021-02-18 ENCOUNTER — TELEPHONE (OUTPATIENT)
Dept: CARDIOLOGY | Facility: CLINIC | Age: 71
End: 2021-02-18

## 2021-02-18 ENCOUNTER — TELEPHONE (OUTPATIENT)
Dept: SLEEP MEDICINE | Facility: HOSPITAL | Age: 71
End: 2021-02-18

## 2021-02-22 ENCOUNTER — HOSPITAL ENCOUNTER (OUTPATIENT)
Dept: CARDIOLOGY | Facility: HOSPITAL | Age: 71
Discharge: HOME | End: 2021-02-22
Attending: INTERNAL MEDICINE
Payer: MEDICARE

## 2021-02-22 VITALS — WEIGHT: 259 LBS | HEIGHT: 62 IN | BODY MASS INDEX: 47.66 KG/M2

## 2021-02-22 DIAGNOSIS — R07.89 ATYPICAL CHEST PAIN: ICD-10-CM

## 2021-02-22 DIAGNOSIS — R01.1 CARDIAC MURMUR: ICD-10-CM

## 2021-02-22 DIAGNOSIS — I48.0 PAROXYSMAL ATRIAL FIBRILLATION: ICD-10-CM

## 2021-02-22 LAB
AORTIC ROOT ANNULUS: 3.15 CM
ASCENDING AORTA: 3.16 CM
AV INDEX (PROSTH): 0.96
AV MEAN GRADIENT: 3 MMHG
AV PEAK GRADIENT: 8 MMHG
AV VALVE AREA: 3.27 CM2
AV VELOCITY RATIO: 0.82
BSA FOR ECHO PROCEDURE: 2.27 M2
CV ECHO LV RWT: 0.45 CM
DOP CALC AO PEAK VEL: 1.37 M/S
DOP CALC AO VTI: 28.62 CM
DOP CALC LVOT AREA: 3.4 CM2
DOP CALC LVOT DIAMETER: 2.08 CM
DOP CALC LVOT PEAK VEL: 1.12 M/S
DOP CALC LVOT STROKE VOLUME: 93.46 CM3
DOP CALCLVOT PEAK VEL VTI: 27.52 CM
E WAVE DECELERATION TIME: 268.37 MSEC
E/A RATIO: 0.85
E/E' RATIO: 7.89 M/S
ECHO LV POSTERIOR WALL: 0.9 CM (ref 0.6–1.1)
FRACTIONAL SHORTENING: 33 % (ref 28–44)
INTERVENTRICULAR SEPTUM: 1 CM (ref 0.6–1.1)
LA MAJOR: 5.08 CM
LA MINOR: 4.82 CM
LA WIDTH: 3.93 CM
LEFT ATRIUM SIZE: 3.35 CM
LEFT ATRIUM VOLUME INDEX MOD: 22.6 ML/M2
LEFT ATRIUM VOLUME INDEX: 26 ML/M2
LEFT ATRIUM VOLUME MOD: 48.17 CM3
LEFT ATRIUM VOLUME: 55.36 CM3
LEFT INTERNAL DIMENSION IN SYSTOLE: 2.68 CM (ref 2.1–4)
LEFT VENTRICLE DIASTOLIC VOLUME INDEX: 36.72 ML/M2
LEFT VENTRICLE DIASTOLIC VOLUME: 78.21 ML
LEFT VENTRICLE MASS INDEX: 56 G/M2
LEFT VENTRICLE SYSTOLIC VOLUME INDEX: 12.4 ML/M2
LEFT VENTRICLE SYSTOLIC VOLUME: 26.45 ML
LEFT VENTRICULAR INTERNAL DIMENSION IN DIASTOLE: 4 CM (ref 3.5–6)
LEFT VENTRICULAR MASS: 118.23 G
LV LATERAL E/E' RATIO: 7.89 M/S
LV SEPTAL E/E' RATIO: 7.89 M/S
MV A" WAVE DURATION": 11.8 MSEC
MV MEAN GRADIENT: 0 MMHG
MV PEAK A VEL: 0.84 M/S
MV PEAK E VEL: 0.71 M/S
MV PEAK GRADIENT: 3 MMHG
MV STENOSIS PRESSURE HALF TIME: 77.83 MS
MV VALVE AREA P 1/2 METHOD: 2.83 CM2
PISA TR MAX VEL: 2.76 M/S
PULM VEIN S/D RATIO: 1.33
PV PEAK D VEL: 0.52 M/S
PV PEAK S VEL: 0.69 M/S
PV PEAK VELOCITY: 1.22 CM/S
RA MAJOR: 4.34 CM
RA PRESSURE: 3 MMHG
RA WIDTH: 2.81 CM
RIGHT VENTRICULAR END-DIASTOLIC DIMENSION: 2.88 CM
RV TISSUE DOPPLER FREE WALL SYSTOLIC VELOCITY 1 (APICAL 4 CHAMBER VIEW): 11.66 CM/S
STJ: 2.59 CM
TDI LATERAL: 0.09 M/S
TDI SEPTAL: 0.09 M/S
TDI: 0.09 M/S
TR MAX PG: 30 MMHG
TRICUSPID ANNULAR PLANE SYSTOLIC EXCURSION: 1.76 CM
TV REST PULMONARY ARTERY PRESSURE: 33 MMHG

## 2021-02-22 PROCEDURE — 93306 TTE W/DOPPLER COMPLETE: CPT

## 2021-02-22 PROCEDURE — 93306 ECHO (CUPID ONLY): ICD-10-PCS | Mod: 26,,, | Performed by: INTERNAL MEDICINE

## 2021-02-22 PROCEDURE — 93306 TTE W/DOPPLER COMPLETE: CPT | Mod: 26,,, | Performed by: INTERNAL MEDICINE

## 2021-03-01 ENCOUNTER — OFFICE VISIT (OUTPATIENT)
Dept: CARDIOLOGY | Facility: CLINIC | Age: 71
End: 2021-03-01
Payer: MEDICARE

## 2021-03-01 ENCOUNTER — TELEPHONE (OUTPATIENT)
Dept: CARDIOLOGY | Facility: CLINIC | Age: 71
End: 2021-03-01

## 2021-03-01 VITALS
DIASTOLIC BLOOD PRESSURE: 74 MMHG | HEIGHT: 62 IN | HEART RATE: 65 BPM | BODY MASS INDEX: 53.55 KG/M2 | OXYGEN SATURATION: 98 % | WEIGHT: 291 LBS | SYSTOLIC BLOOD PRESSURE: 111 MMHG

## 2021-03-01 DIAGNOSIS — R06.02 SHORTNESS OF BREATH: ICD-10-CM

## 2021-03-01 DIAGNOSIS — E66.01 MORBID OBESITY DUE TO EXCESS CALORIES: ICD-10-CM

## 2021-03-01 DIAGNOSIS — Z91.89 FRAMINGHAM CARDIAC RISK <10% IN NEXT 10 YEARS: ICD-10-CM

## 2021-03-01 DIAGNOSIS — I10 ESSENTIAL HYPERTENSION: Primary | ICD-10-CM

## 2021-03-01 PROBLEM — E66.813 CLASS 3 SEVERE OBESITY DUE TO EXCESS CALORIES WITHOUT SERIOUS COMORBIDITY WITH BODY MASS INDEX (BMI) OF 50.0 TO 59.9 IN ADULT: Status: ACTIVE | Noted: 2017-07-24

## 2021-03-01 PROCEDURE — 1101F PR PT FALLS ASSESS DOC 0-1 FALLS W/OUT INJ PAST YR: ICD-10-PCS | Mod: CPTII,S$GLB,, | Performed by: INTERNAL MEDICINE

## 2021-03-01 PROCEDURE — 1159F PR MEDICATION LIST DOCUMENTED IN MEDICAL RECORD: ICD-10-PCS | Mod: S$GLB,,, | Performed by: INTERNAL MEDICINE

## 2021-03-01 PROCEDURE — 3078F PR MOST RECENT DIASTOLIC BLOOD PRESSURE < 80 MM HG: ICD-10-PCS | Mod: CPTII,S$GLB,, | Performed by: INTERNAL MEDICINE

## 2021-03-01 PROCEDURE — 99205 PR OFFICE/OUTPT VISIT, NEW, LEVL V, 60-74 MIN: ICD-10-PCS | Mod: S$GLB,,, | Performed by: INTERNAL MEDICINE

## 2021-03-01 PROCEDURE — 3008F BODY MASS INDEX DOCD: CPT | Mod: CPTII,S$GLB,, | Performed by: INTERNAL MEDICINE

## 2021-03-01 PROCEDURE — 1126F AMNT PAIN NOTED NONE PRSNT: CPT | Mod: S$GLB,,, | Performed by: INTERNAL MEDICINE

## 2021-03-01 PROCEDURE — 3078F DIAST BP <80 MM HG: CPT | Mod: CPTII,S$GLB,, | Performed by: INTERNAL MEDICINE

## 2021-03-01 PROCEDURE — 99499 UNLISTED E&M SERVICE: CPT | Mod: S$PBB,,, | Performed by: INTERNAL MEDICINE

## 2021-03-01 PROCEDURE — 3288F PR FALLS RISK ASSESSMENT DOCUMENTED: ICD-10-PCS | Mod: CPTII,S$GLB,, | Performed by: INTERNAL MEDICINE

## 2021-03-01 PROCEDURE — 3074F SYST BP LT 130 MM HG: CPT | Mod: CPTII,S$GLB,, | Performed by: INTERNAL MEDICINE

## 2021-03-01 PROCEDURE — 99205 OFFICE O/P NEW HI 60 MIN: CPT | Mod: S$GLB,,, | Performed by: INTERNAL MEDICINE

## 2021-03-01 PROCEDURE — 3288F FALL RISK ASSESSMENT DOCD: CPT | Mod: CPTII,S$GLB,, | Performed by: INTERNAL MEDICINE

## 2021-03-01 PROCEDURE — 1126F PR PAIN SEVERITY QUANTIFIED, NO PAIN PRESENT: ICD-10-PCS | Mod: S$GLB,,, | Performed by: INTERNAL MEDICINE

## 2021-03-01 PROCEDURE — 3008F PR BODY MASS INDEX (BMI) DOCUMENTED: ICD-10-PCS | Mod: CPTII,S$GLB,, | Performed by: INTERNAL MEDICINE

## 2021-03-01 PROCEDURE — 99999 PR PBB SHADOW E&M-EST. PATIENT-LVL III: CPT | Mod: PBBFAC,,, | Performed by: INTERNAL MEDICINE

## 2021-03-01 PROCEDURE — 99999 PR PBB SHADOW E&M-EST. PATIENT-LVL III: ICD-10-PCS | Mod: PBBFAC,,, | Performed by: INTERNAL MEDICINE

## 2021-03-01 PROCEDURE — 1101F PT FALLS ASSESS-DOCD LE1/YR: CPT | Mod: CPTII,S$GLB,, | Performed by: INTERNAL MEDICINE

## 2021-03-01 PROCEDURE — 1159F MED LIST DOCD IN RCRD: CPT | Mod: S$GLB,,, | Performed by: INTERNAL MEDICINE

## 2021-03-01 PROCEDURE — 99499 RISK ADDL DX/OHS AUDIT: ICD-10-PCS | Mod: S$PBB,,, | Performed by: INTERNAL MEDICINE

## 2021-03-01 PROCEDURE — 3074F PR MOST RECENT SYSTOLIC BLOOD PRESSURE < 130 MM HG: ICD-10-PCS | Mod: CPTII,S$GLB,, | Performed by: INTERNAL MEDICINE

## 2021-03-01 RX ORDER — ROSUVASTATIN CALCIUM 20 MG/1
20 TABLET, COATED ORAL NIGHTLY
Qty: 90 TABLET | Refills: 3 | Status: SHIPPED | OUTPATIENT
Start: 2021-03-01 | End: 2022-03-28 | Stop reason: SDUPTHER

## 2021-03-05 ENCOUNTER — HOSPITAL ENCOUNTER (OUTPATIENT)
Dept: SLEEP MEDICINE | Facility: HOSPITAL | Age: 71
Discharge: HOME | End: 2021-03-05
Attending: INTERNAL MEDICINE
Payer: MEDICARE

## 2021-03-05 DIAGNOSIS — K21.9 GASTROESOPHAGEAL REFLUX DISEASE, UNSPECIFIED WHETHER ESOPHAGITIS PRESENT: ICD-10-CM

## 2021-03-05 DIAGNOSIS — G47.01 INSOMNIA DUE TO MEDICAL CONDITION: ICD-10-CM

## 2021-03-05 DIAGNOSIS — G60.9 IDIOPATHIC PERIPHERAL NEUROPATHY: ICD-10-CM

## 2021-03-05 DIAGNOSIS — R41.3 MEMORY CHANGES: ICD-10-CM

## 2021-03-05 DIAGNOSIS — R41.840 ATTENTION AND CONCENTRATION DEFICIT: ICD-10-CM

## 2021-03-05 DIAGNOSIS — R06.83 SNORING: ICD-10-CM

## 2021-03-05 DIAGNOSIS — I10 ESSENTIAL HYPERTENSION: ICD-10-CM

## 2021-03-05 DIAGNOSIS — G47.33 OSA (OBSTRUCTIVE SLEEP APNEA): Primary | ICD-10-CM

## 2021-03-05 DIAGNOSIS — G47.39 OTHER SLEEP APNEA: ICD-10-CM

## 2021-03-05 DIAGNOSIS — G47.00 INSOMNIA, UNSPECIFIED TYPE: ICD-10-CM

## 2021-03-05 PROCEDURE — 95810 POLYSOM 6/> YRS 4/> PARAM: CPT | Mod: 26,,, | Performed by: INTERNAL MEDICINE

## 2021-03-05 PROCEDURE — 95810 PR POLYSOMNOGRAPHY, 4 OR MORE: ICD-10-PCS | Mod: 26,,, | Performed by: INTERNAL MEDICINE

## 2021-03-05 PROCEDURE — 95810 POLYSOM 6/> YRS 4/> PARAM: CPT

## 2021-03-12 ENCOUNTER — TELEPHONE (OUTPATIENT)
Dept: SLEEP MEDICINE | Facility: CLINIC | Age: 71
End: 2021-03-12

## 2021-03-17 ENCOUNTER — TELEPHONE (OUTPATIENT)
Dept: SLEEP MEDICINE | Facility: CLINIC | Age: 71
End: 2021-03-17

## 2021-03-17 DIAGNOSIS — G47.33 OSA (OBSTRUCTIVE SLEEP APNEA): Primary | ICD-10-CM

## 2021-03-18 ENCOUNTER — TELEPHONE (OUTPATIENT)
Dept: SLEEP MEDICINE | Facility: CLINIC | Age: 71
End: 2021-03-18

## 2021-03-18 ENCOUNTER — OFFICE VISIT (OUTPATIENT)
Dept: INTERNAL MEDICINE | Facility: CLINIC | Age: 71
End: 2021-03-18
Payer: MEDICARE

## 2021-03-18 VITALS
SYSTOLIC BLOOD PRESSURE: 144 MMHG | DIASTOLIC BLOOD PRESSURE: 80 MMHG | HEART RATE: 57 BPM | HEIGHT: 62 IN | WEIGHT: 293 LBS | OXYGEN SATURATION: 99 % | BODY MASS INDEX: 53.92 KG/M2

## 2021-03-18 DIAGNOSIS — K21.9 GASTROESOPHAGEAL REFLUX DISEASE WITHOUT ESOPHAGITIS: ICD-10-CM

## 2021-03-18 DIAGNOSIS — J30.2 SEASONAL ALLERGIES: ICD-10-CM

## 2021-03-18 DIAGNOSIS — G47.30 SLEEP APNEA, UNSPECIFIED TYPE: Primary | ICD-10-CM

## 2021-03-18 DIAGNOSIS — I10 HYPERTENSION, UNSPECIFIED TYPE: ICD-10-CM

## 2021-03-18 DIAGNOSIS — Z71.89 COUNSELED ABOUT COVID-19 VIRUS INFECTION: ICD-10-CM

## 2021-03-18 PROCEDURE — 3079F PR MOST RECENT DIASTOLIC BLOOD PRESSURE 80-89 MM HG: ICD-10-PCS | Mod: CPTII,S$GLB,, | Performed by: INTERNAL MEDICINE

## 2021-03-18 PROCEDURE — 1159F MED LIST DOCD IN RCRD: CPT | Mod: S$GLB,,, | Performed by: INTERNAL MEDICINE

## 2021-03-18 PROCEDURE — 3288F PR FALLS RISK ASSESSMENT DOCUMENTED: ICD-10-PCS | Mod: CPTII,S$GLB,, | Performed by: INTERNAL MEDICINE

## 2021-03-18 PROCEDURE — 1159F PR MEDICATION LIST DOCUMENTED IN MEDICAL RECORD: ICD-10-PCS | Mod: S$GLB,,, | Performed by: INTERNAL MEDICINE

## 2021-03-18 PROCEDURE — 99999 PR PBB SHADOW E&M-EST. PATIENT-LVL III: CPT | Mod: PBBFAC,,, | Performed by: INTERNAL MEDICINE

## 2021-03-18 PROCEDURE — 3079F DIAST BP 80-89 MM HG: CPT | Mod: CPTII,S$GLB,, | Performed by: INTERNAL MEDICINE

## 2021-03-18 PROCEDURE — 3077F PR MOST RECENT SYSTOLIC BLOOD PRESSURE >= 140 MM HG: ICD-10-PCS | Mod: CPTII,S$GLB,, | Performed by: INTERNAL MEDICINE

## 2021-03-18 PROCEDURE — 3288F FALL RISK ASSESSMENT DOCD: CPT | Mod: CPTII,S$GLB,, | Performed by: INTERNAL MEDICINE

## 2021-03-18 PROCEDURE — 1101F PT FALLS ASSESS-DOCD LE1/YR: CPT | Mod: CPTII,S$GLB,, | Performed by: INTERNAL MEDICINE

## 2021-03-18 PROCEDURE — 3008F BODY MASS INDEX DOCD: CPT | Mod: CPTII,S$GLB,, | Performed by: INTERNAL MEDICINE

## 2021-03-18 PROCEDURE — 99999 PR PBB SHADOW E&M-EST. PATIENT-LVL III: ICD-10-PCS | Mod: PBBFAC,,, | Performed by: INTERNAL MEDICINE

## 2021-03-18 PROCEDURE — 3008F PR BODY MASS INDEX (BMI) DOCUMENTED: ICD-10-PCS | Mod: CPTII,S$GLB,, | Performed by: INTERNAL MEDICINE

## 2021-03-18 PROCEDURE — 3077F SYST BP >= 140 MM HG: CPT | Mod: CPTII,S$GLB,, | Performed by: INTERNAL MEDICINE

## 2021-03-18 PROCEDURE — 1101F PR PT FALLS ASSESS DOC 0-1 FALLS W/OUT INJ PAST YR: ICD-10-PCS | Mod: CPTII,S$GLB,, | Performed by: INTERNAL MEDICINE

## 2021-03-18 PROCEDURE — 99215 OFFICE O/P EST HI 40 MIN: CPT | Mod: S$GLB,,, | Performed by: INTERNAL MEDICINE

## 2021-03-18 PROCEDURE — 99215 PR OFFICE/OUTPT VISIT, EST, LEVL V, 40-54 MIN: ICD-10-PCS | Mod: S$GLB,,, | Performed by: INTERNAL MEDICINE

## 2021-03-18 RX ORDER — LISINOPRIL 20 MG/1
20 TABLET ORAL DAILY
Qty: 90 TABLET | Refills: 3 | Status: SHIPPED | OUTPATIENT
Start: 2021-03-18 | End: 2021-03-19 | Stop reason: SDUPTHER

## 2021-03-18 RX ORDER — OMEPRAZOLE 40 MG/1
40 CAPSULE, DELAYED RELEASE ORAL DAILY
Qty: 90 CAPSULE | Refills: 3 | Status: SHIPPED | OUTPATIENT
Start: 2021-03-18 | End: 2021-03-19 | Stop reason: SDUPTHER

## 2021-03-18 RX ORDER — HYDROXYZINE HYDROCHLORIDE 25 MG/1
25 TABLET, FILM COATED ORAL 4 TIMES DAILY PRN
Qty: 270 TABLET | Refills: 3 | Status: SHIPPED | OUTPATIENT
Start: 2021-03-18 | End: 2021-03-19 | Stop reason: SDUPTHER

## 2021-03-18 RX ORDER — TRIAMTERENE AND HYDROCHLOROTHIAZIDE 37.5; 25 MG/1; MG/1
1 CAPSULE ORAL EVERY MORNING
COMMUNITY
Start: 2021-02-25 | End: 2021-06-18 | Stop reason: ALTCHOICE

## 2021-03-19 ENCOUNTER — TELEPHONE (OUTPATIENT)
Dept: SLEEP MEDICINE | Facility: CLINIC | Age: 71
End: 2021-03-19

## 2021-03-19 DIAGNOSIS — J30.2 SEASONAL ALLERGIES: ICD-10-CM

## 2021-03-19 DIAGNOSIS — I10 HYPERTENSION, UNSPECIFIED TYPE: ICD-10-CM

## 2021-03-19 DIAGNOSIS — K21.9 GASTROESOPHAGEAL REFLUX DISEASE WITHOUT ESOPHAGITIS: ICD-10-CM

## 2021-03-19 RX ORDER — LISINOPRIL 20 MG/1
20 TABLET ORAL DAILY
Qty: 90 TABLET | Refills: 3 | Status: SHIPPED | OUTPATIENT
Start: 2021-03-19 | End: 2021-06-18 | Stop reason: ALTCHOICE

## 2021-03-19 RX ORDER — HYDROXYZINE HYDROCHLORIDE 25 MG/1
25 TABLET, FILM COATED ORAL 4 TIMES DAILY PRN
Qty: 270 TABLET | Refills: 3 | Status: SHIPPED | OUTPATIENT
Start: 2021-03-19 | End: 2022-10-13 | Stop reason: SDUPTHER

## 2021-03-19 RX ORDER — OMEPRAZOLE 40 MG/1
40 CAPSULE, DELAYED RELEASE ORAL DAILY
Qty: 90 CAPSULE | Refills: 3 | Status: SHIPPED | OUTPATIENT
Start: 2021-03-19 | End: 2021-10-15 | Stop reason: SDUPTHER

## 2021-03-22 ENCOUNTER — TELEPHONE (OUTPATIENT)
Dept: NEUROLOGY | Facility: CLINIC | Age: 71
End: 2021-03-22

## 2021-03-22 ENCOUNTER — TELEPHONE (OUTPATIENT)
Dept: SLEEP MEDICINE | Facility: CLINIC | Age: 71
End: 2021-03-22

## 2021-03-29 ENCOUNTER — TELEPHONE (OUTPATIENT)
Dept: SLEEP MEDICINE | Facility: CLINIC | Age: 71
End: 2021-03-29

## 2021-03-30 ENCOUNTER — OFFICE VISIT (OUTPATIENT)
Dept: SLEEP MEDICINE | Facility: CLINIC | Age: 71
End: 2021-03-30
Payer: MEDICARE

## 2021-03-30 VITALS
BODY MASS INDEX: 54.48 KG/M2 | SYSTOLIC BLOOD PRESSURE: 129 MMHG | HEIGHT: 62 IN | HEART RATE: 63 BPM | DIASTOLIC BLOOD PRESSURE: 67 MMHG

## 2021-03-30 DIAGNOSIS — G47.33 OSA (OBSTRUCTIVE SLEEP APNEA): Primary | ICD-10-CM

## 2021-03-30 PROCEDURE — 3288F FALL RISK ASSESSMENT DOCD: CPT | Mod: CPTII,S$GLB,, | Performed by: PSYCHIATRY & NEUROLOGY

## 2021-03-30 PROCEDURE — 1126F AMNT PAIN NOTED NONE PRSNT: CPT | Mod: S$GLB,,, | Performed by: PSYCHIATRY & NEUROLOGY

## 2021-03-30 PROCEDURE — 99999 PR PBB SHADOW E&M-EST. PATIENT-LVL III: ICD-10-PCS | Mod: PBBFAC,,, | Performed by: PSYCHIATRY & NEUROLOGY

## 2021-03-30 PROCEDURE — 99214 OFFICE O/P EST MOD 30 MIN: CPT | Mod: S$GLB,,, | Performed by: PSYCHIATRY & NEUROLOGY

## 2021-03-30 PROCEDURE — 1101F PR PT FALLS ASSESS DOC 0-1 FALLS W/OUT INJ PAST YR: ICD-10-PCS | Mod: CPTII,S$GLB,, | Performed by: PSYCHIATRY & NEUROLOGY

## 2021-03-30 PROCEDURE — 3074F PR MOST RECENT SYSTOLIC BLOOD PRESSURE < 130 MM HG: ICD-10-PCS | Mod: CPTII,S$GLB,, | Performed by: PSYCHIATRY & NEUROLOGY

## 2021-03-30 PROCEDURE — 1159F MED LIST DOCD IN RCRD: CPT | Mod: S$GLB,,, | Performed by: PSYCHIATRY & NEUROLOGY

## 2021-03-30 PROCEDURE — 1159F PR MEDICATION LIST DOCUMENTED IN MEDICAL RECORD: ICD-10-PCS | Mod: S$GLB,,, | Performed by: PSYCHIATRY & NEUROLOGY

## 2021-03-30 PROCEDURE — 99999 PR PBB SHADOW E&M-EST. PATIENT-LVL III: CPT | Mod: PBBFAC,,, | Performed by: PSYCHIATRY & NEUROLOGY

## 2021-03-30 PROCEDURE — 3008F PR BODY MASS INDEX (BMI) DOCUMENTED: ICD-10-PCS | Mod: CPTII,S$GLB,, | Performed by: PSYCHIATRY & NEUROLOGY

## 2021-03-30 PROCEDURE — 1126F PR PAIN SEVERITY QUANTIFIED, NO PAIN PRESENT: ICD-10-PCS | Mod: S$GLB,,, | Performed by: PSYCHIATRY & NEUROLOGY

## 2021-03-30 PROCEDURE — 1101F PT FALLS ASSESS-DOCD LE1/YR: CPT | Mod: CPTII,S$GLB,, | Performed by: PSYCHIATRY & NEUROLOGY

## 2021-03-30 PROCEDURE — 99214 PR OFFICE/OUTPT VISIT, EST, LEVL IV, 30-39 MIN: ICD-10-PCS | Mod: S$GLB,,, | Performed by: PSYCHIATRY & NEUROLOGY

## 2021-03-30 PROCEDURE — 3078F PR MOST RECENT DIASTOLIC BLOOD PRESSURE < 80 MM HG: ICD-10-PCS | Mod: CPTII,S$GLB,, | Performed by: PSYCHIATRY & NEUROLOGY

## 2021-03-30 PROCEDURE — 3074F SYST BP LT 130 MM HG: CPT | Mod: CPTII,S$GLB,, | Performed by: PSYCHIATRY & NEUROLOGY

## 2021-03-30 PROCEDURE — 3078F DIAST BP <80 MM HG: CPT | Mod: CPTII,S$GLB,, | Performed by: PSYCHIATRY & NEUROLOGY

## 2021-03-30 PROCEDURE — 3008F BODY MASS INDEX DOCD: CPT | Mod: CPTII,S$GLB,, | Performed by: PSYCHIATRY & NEUROLOGY

## 2021-03-30 PROCEDURE — 3288F PR FALLS RISK ASSESSMENT DOCUMENTED: ICD-10-PCS | Mod: CPTII,S$GLB,, | Performed by: PSYCHIATRY & NEUROLOGY

## 2021-04-05 ENCOUNTER — TELEPHONE (OUTPATIENT)
Dept: SLEEP MEDICINE | Facility: HOSPITAL | Age: 71
End: 2021-04-05

## 2021-04-08 ENCOUNTER — TELEPHONE (OUTPATIENT)
Dept: SLEEP MEDICINE | Facility: CLINIC | Age: 71
End: 2021-04-08

## 2021-04-08 DIAGNOSIS — G47.33 OSA (OBSTRUCTIVE SLEEP APNEA): Primary | ICD-10-CM

## 2021-04-30 ENCOUNTER — TELEPHONE (OUTPATIENT)
Dept: SLEEP MEDICINE | Facility: HOSPITAL | Age: 71
End: 2021-04-30

## 2021-04-30 DIAGNOSIS — Z01.812 PRE-PROCEDURE LAB EXAM: ICD-10-CM

## 2021-05-25 ENCOUNTER — LAB VISIT (OUTPATIENT)
Dept: FAMILY MEDICINE | Facility: CLINIC | Age: 71
End: 2021-05-25
Payer: MEDICARE

## 2021-05-25 DIAGNOSIS — Z01.812 PRE-PROCEDURE LAB EXAM: ICD-10-CM

## 2021-05-25 PROCEDURE — U0005 INFEC AGEN DETEC AMPLI PROBE: HCPCS | Performed by: INTERNAL MEDICINE

## 2021-05-25 PROCEDURE — U0003 INFECTIOUS AGENT DETECTION BY NUCLEIC ACID (DNA OR RNA); SEVERE ACUTE RESPIRATORY SYNDROME CORONAVIRUS 2 (SARS-COV-2) (CORONAVIRUS DISEASE [COVID-19]), AMPLIFIED PROBE TECHNIQUE, MAKING USE OF HIGH THROUGHPUT TECHNOLOGIES AS DESCRIBED BY CMS-2020-01-R: HCPCS | Performed by: INTERNAL MEDICINE

## 2021-05-26 LAB — SARS-COV-2 RNA RESP QL NAA+PROBE: NOT DETECTED

## 2021-05-28 ENCOUNTER — HOSPITAL ENCOUNTER (OUTPATIENT)
Dept: SLEEP MEDICINE | Facility: HOSPITAL | Age: 71
Discharge: HOME | End: 2021-05-28
Attending: PSYCHIATRY & NEUROLOGY
Payer: MEDICARE

## 2021-05-28 DIAGNOSIS — G47.33 OSA (OBSTRUCTIVE SLEEP APNEA): ICD-10-CM

## 2021-05-28 PROCEDURE — 95811 POLYSOM 6/>YRS CPAP 4/> PARM: CPT

## 2021-05-28 PROCEDURE — 95811 PR POLYSOMNOGRAPHY W/CPAP: ICD-10-PCS | Mod: 26,,, | Performed by: PSYCHIATRY & NEUROLOGY

## 2021-05-28 PROCEDURE — 95811 POLYSOM 6/>YRS CPAP 4/> PARM: CPT | Mod: 26,,, | Performed by: PSYCHIATRY & NEUROLOGY

## 2021-06-01 ENCOUNTER — TELEPHONE (OUTPATIENT)
Dept: SLEEP MEDICINE | Facility: CLINIC | Age: 71
End: 2021-06-01

## 2021-06-04 ENCOUNTER — TELEPHONE (OUTPATIENT)
Dept: SLEEP MEDICINE | Facility: CLINIC | Age: 71
End: 2021-06-04

## 2021-06-04 ENCOUNTER — TELEPHONE (OUTPATIENT)
Dept: INTERNAL MEDICINE | Facility: CLINIC | Age: 71
End: 2021-06-04

## 2021-06-09 ENCOUNTER — TELEPHONE (OUTPATIENT)
Dept: INTERNAL MEDICINE | Facility: CLINIC | Age: 71
End: 2021-06-09

## 2021-06-10 ENCOUNTER — TELEPHONE (OUTPATIENT)
Dept: SLEEP MEDICINE | Facility: CLINIC | Age: 71
End: 2021-06-10

## 2021-06-10 DIAGNOSIS — G47.33 OSA (OBSTRUCTIVE SLEEP APNEA): Primary | ICD-10-CM

## 2021-06-15 ENCOUNTER — TELEPHONE (OUTPATIENT)
Dept: SLEEP MEDICINE | Facility: CLINIC | Age: 71
End: 2021-06-15

## 2021-06-16 ENCOUNTER — TELEPHONE (OUTPATIENT)
Dept: SLEEP MEDICINE | Facility: CLINIC | Age: 71
End: 2021-06-16

## 2021-06-18 ENCOUNTER — OFFICE VISIT (OUTPATIENT)
Dept: INTERNAL MEDICINE | Facility: CLINIC | Age: 71
End: 2021-06-18
Payer: MEDICARE

## 2021-06-18 DIAGNOSIS — R05.8 UPPER AIRWAY COUGH SYNDROME: ICD-10-CM

## 2021-06-18 DIAGNOSIS — I10 ESSENTIAL HYPERTENSION, BENIGN: Primary | ICD-10-CM

## 2021-06-18 PROCEDURE — 99442 PR PHYSICIAN TELEPHONE EVALUATION 11-20 MIN: ICD-10-PCS | Mod: 95,,, | Performed by: INTERNAL MEDICINE

## 2021-06-18 PROCEDURE — 99442 PR PHYSICIAN TELEPHONE EVALUATION 11-20 MIN: CPT | Mod: 95,,, | Performed by: INTERNAL MEDICINE

## 2021-06-18 RX ORDER — LISINOPRIL AND HYDROCHLOROTHIAZIDE 12.5; 2 MG/1; MG/1
1 TABLET ORAL DAILY
Qty: 30 TABLET | Refills: 2 | Status: SHIPPED | OUTPATIENT
Start: 2021-06-18 | End: 2021-07-19 | Stop reason: SDUPTHER

## 2021-06-18 RX ORDER — FLUTICASONE PROPIONATE 50 MCG
1 SPRAY, SUSPENSION (ML) NASAL 2 TIMES DAILY
Qty: 16 G | Refills: 11 | Status: SHIPPED | OUTPATIENT
Start: 2021-06-18 | End: 2022-11-04 | Stop reason: SDUPTHER

## 2021-06-18 RX ORDER — ALBUTEROL SULFATE 90 UG/1
2 AEROSOL, METERED RESPIRATORY (INHALATION) EVERY 6 HOURS PRN
Qty: 18 G | Refills: 3 | Status: SHIPPED | OUTPATIENT
Start: 2021-06-18 | End: 2022-08-15 | Stop reason: SDUPTHER

## 2021-07-19 ENCOUNTER — TELEPHONE (OUTPATIENT)
Dept: INTERNAL MEDICINE | Facility: CLINIC | Age: 71
End: 2021-07-19

## 2021-07-19 DIAGNOSIS — I10 ESSENTIAL HYPERTENSION, BENIGN: ICD-10-CM

## 2021-07-19 DIAGNOSIS — R05.8 UPPER AIRWAY COUGH SYNDROME: Primary | ICD-10-CM

## 2021-07-19 RX ORDER — BENZONATATE 200 MG/1
200 CAPSULE ORAL 3 TIMES DAILY PRN
Qty: 30 CAPSULE | Refills: 3 | Status: SHIPPED | OUTPATIENT
Start: 2021-07-19 | End: 2021-07-29

## 2021-07-19 RX ORDER — LISINOPRIL AND HYDROCHLOROTHIAZIDE 12.5; 2 MG/1; MG/1
1 TABLET ORAL DAILY
Qty: 90 TABLET | Refills: 3 | Status: SHIPPED | OUTPATIENT
Start: 2021-07-19 | End: 2021-07-22

## 2021-07-22 ENCOUNTER — TELEPHONE (OUTPATIENT)
Dept: INTERNAL MEDICINE | Facility: CLINIC | Age: 71
End: 2021-07-22

## 2021-07-22 DIAGNOSIS — I10 ESSENTIAL HYPERTENSION, BENIGN: Primary | ICD-10-CM

## 2021-07-22 RX ORDER — LOSARTAN POTASSIUM AND HYDROCHLOROTHIAZIDE 12.5; 5 MG/1; MG/1
1 TABLET ORAL DAILY
Qty: 90 TABLET | Refills: 3 | Status: SHIPPED | OUTPATIENT
Start: 2021-07-22 | End: 2021-09-09

## 2021-07-23 ENCOUNTER — OUTPATIENT CASE MANAGEMENT (OUTPATIENT)
Dept: ADMINISTRATIVE | Facility: OTHER | Age: 71
End: 2021-07-23

## 2021-07-28 ENCOUNTER — OUTPATIENT CASE MANAGEMENT (OUTPATIENT)
Dept: ADMINISTRATIVE | Facility: OTHER | Age: 71
End: 2021-07-28

## 2021-08-02 DIAGNOSIS — G47.00 INSOMNIA, UNSPECIFIED TYPE: ICD-10-CM

## 2021-08-02 RX ORDER — TRAZODONE HYDROCHLORIDE 50 MG/1
50 TABLET ORAL NIGHTLY PRN
Qty: 30 TABLET | Refills: 0 | Status: SHIPPED | OUTPATIENT
Start: 2021-08-02 | End: 2021-08-20

## 2021-08-06 ENCOUNTER — TELEPHONE (OUTPATIENT)
Dept: INTERNAL MEDICINE | Facility: CLINIC | Age: 71
End: 2021-08-06

## 2021-08-19 ENCOUNTER — TELEPHONE (OUTPATIENT)
Dept: SLEEP MEDICINE | Facility: CLINIC | Age: 71
End: 2021-08-19

## 2021-08-19 ENCOUNTER — TELEPHONE (OUTPATIENT)
Dept: INTERNAL MEDICINE | Facility: CLINIC | Age: 71
End: 2021-08-19

## 2021-08-20 ENCOUNTER — TELEPHONE (OUTPATIENT)
Dept: SLEEP MEDICINE | Facility: CLINIC | Age: 71
End: 2021-08-20

## 2021-08-20 ENCOUNTER — TELEPHONE (OUTPATIENT)
Dept: INTERNAL MEDICINE | Facility: CLINIC | Age: 71
End: 2021-08-20

## 2021-08-20 DIAGNOSIS — G89.29 CHRONIC LOW BACK PAIN WITHOUT SCIATICA, UNSPECIFIED BACK PAIN LATERALITY: Primary | ICD-10-CM

## 2021-08-20 DIAGNOSIS — G47.00 INSOMNIA, UNSPECIFIED TYPE: ICD-10-CM

## 2021-08-20 DIAGNOSIS — M54.50 CHRONIC LOW BACK PAIN WITHOUT SCIATICA, UNSPECIFIED BACK PAIN LATERALITY: Primary | ICD-10-CM

## 2021-08-20 RX ORDER — TRAZODONE HYDROCHLORIDE 50 MG/1
TABLET ORAL
Qty: 90 TABLET | Refills: 0 | OUTPATIENT
Start: 2021-08-20

## 2021-08-21 DIAGNOSIS — G47.33 OSA (OBSTRUCTIVE SLEEP APNEA): Primary | ICD-10-CM

## 2021-08-23 ENCOUNTER — TELEPHONE (OUTPATIENT)
Dept: INTERNAL MEDICINE | Facility: CLINIC | Age: 71
End: 2021-08-23

## 2021-08-24 RX ORDER — TRAZODONE HYDROCHLORIDE 50 MG/1
TABLET ORAL
Qty: 90 TABLET | Refills: 3 | Status: SHIPPED | OUTPATIENT
Start: 2021-08-24 | End: 2022-03-23 | Stop reason: SDUPTHER

## 2021-08-25 ENCOUNTER — PATIENT OUTREACH (OUTPATIENT)
Dept: ADMINISTRATIVE | Facility: OTHER | Age: 71
End: 2021-08-25

## 2021-08-26 ENCOUNTER — TELEPHONE (OUTPATIENT)
Dept: SLEEP MEDICINE | Facility: CLINIC | Age: 71
End: 2021-08-26

## 2021-09-08 DIAGNOSIS — I10 ESSENTIAL HYPERTENSION, BENIGN: ICD-10-CM

## 2021-09-10 RX ORDER — LOSARTAN POTASSIUM AND HYDROCHLOROTHIAZIDE 12.5; 5 MG/1; MG/1
1 TABLET ORAL DAILY
Qty: 90 TABLET | Refills: 1 | Status: SHIPPED | OUTPATIENT
Start: 2021-09-10 | End: 2022-01-18 | Stop reason: SDUPTHER

## 2021-09-13 ENCOUNTER — TELEPHONE (OUTPATIENT)
Dept: SLEEP MEDICINE | Facility: CLINIC | Age: 71
End: 2021-09-13

## 2021-09-13 ENCOUNTER — TELEPHONE (OUTPATIENT)
Dept: INTERNAL MEDICINE | Facility: CLINIC | Age: 71
End: 2021-09-13

## 2021-09-14 ENCOUNTER — TELEPHONE (OUTPATIENT)
Dept: SLEEP MEDICINE | Facility: CLINIC | Age: 71
End: 2021-09-14

## 2021-10-12 ENCOUNTER — TELEPHONE (OUTPATIENT)
Dept: SLEEP MEDICINE | Facility: CLINIC | Age: 71
End: 2021-10-12

## 2021-10-15 ENCOUNTER — OFFICE VISIT (OUTPATIENT)
Dept: INTERNAL MEDICINE | Facility: CLINIC | Age: 71
End: 2021-10-15
Payer: MEDICARE

## 2021-10-15 DIAGNOSIS — K21.9 GASTROESOPHAGEAL REFLUX DISEASE WITHOUT ESOPHAGITIS: ICD-10-CM

## 2021-10-15 DIAGNOSIS — G47.33 OSA (OBSTRUCTIVE SLEEP APNEA): ICD-10-CM

## 2021-10-15 DIAGNOSIS — I10 ESSENTIAL HYPERTENSION, BENIGN: Primary | ICD-10-CM

## 2021-10-15 PROCEDURE — 4010F PR ACE/ARB THEARPY RXD/TAKEN: ICD-10-PCS | Mod: HCNC,CPTII,95, | Performed by: INTERNAL MEDICINE

## 2021-10-15 PROCEDURE — 99442 PR PHYSICIAN TELEPHONE EVALUATION 11-20 MIN: CPT | Mod: HCNC,95,, | Performed by: INTERNAL MEDICINE

## 2021-10-15 PROCEDURE — 99442 PR PHYSICIAN TELEPHONE EVALUATION 11-20 MIN: ICD-10-PCS | Mod: HCNC,95,, | Performed by: INTERNAL MEDICINE

## 2021-10-15 PROCEDURE — 4010F ACE/ARB THERAPY RXD/TAKEN: CPT | Mod: HCNC,CPTII,95, | Performed by: INTERNAL MEDICINE

## 2021-10-15 RX ORDER — OMEPRAZOLE 40 MG/1
40 CAPSULE, DELAYED RELEASE ORAL DAILY
Qty: 90 CAPSULE | Refills: 3 | Status: SHIPPED | OUTPATIENT
Start: 2021-10-15 | End: 2022-01-24 | Stop reason: SDUPTHER

## 2021-10-25 ENCOUNTER — PATIENT OUTREACH (OUTPATIENT)
Dept: ADMINISTRATIVE | Facility: OTHER | Age: 71
End: 2021-10-25
Payer: MEDICARE

## 2021-10-25 DIAGNOSIS — Z12.31 ENCOUNTER FOR SCREENING MAMMOGRAM FOR MALIGNANT NEOPLASM OF BREAST: Primary | ICD-10-CM

## 2021-10-26 ENCOUNTER — OFFICE VISIT (OUTPATIENT)
Dept: SLEEP MEDICINE | Facility: CLINIC | Age: 71
End: 2021-10-26
Payer: MEDICARE

## 2021-10-26 VITALS
DIASTOLIC BLOOD PRESSURE: 82 MMHG | HEART RATE: 62 BPM | SYSTOLIC BLOOD PRESSURE: 132 MMHG | HEIGHT: 62 IN | BODY MASS INDEX: 53.92 KG/M2 | WEIGHT: 293 LBS

## 2021-10-26 DIAGNOSIS — G47.33 OSA (OBSTRUCTIVE SLEEP APNEA): Primary | ICD-10-CM

## 2021-10-26 PROCEDURE — 1101F PT FALLS ASSESS-DOCD LE1/YR: CPT | Mod: HCNC,CPTII,S$GLB, | Performed by: PSYCHIATRY & NEUROLOGY

## 2021-10-26 PROCEDURE — 1160F PR REVIEW ALL MEDS BY PRESCRIBER/CLIN PHARMACIST DOCUMENTED: ICD-10-PCS | Mod: HCNC,CPTII,S$GLB, | Performed by: PSYCHIATRY & NEUROLOGY

## 2021-10-26 PROCEDURE — 3075F PR MOST RECENT SYSTOLIC BLOOD PRESS GE 130-139MM HG: ICD-10-PCS | Mod: HCNC,CPTII,S$GLB, | Performed by: PSYCHIATRY & NEUROLOGY

## 2021-10-26 PROCEDURE — 99214 OFFICE O/P EST MOD 30 MIN: CPT | Mod: HCNC,S$GLB,, | Performed by: PSYCHIATRY & NEUROLOGY

## 2021-10-26 PROCEDURE — 3008F BODY MASS INDEX DOCD: CPT | Mod: HCNC,CPTII,S$GLB, | Performed by: PSYCHIATRY & NEUROLOGY

## 2021-10-26 PROCEDURE — 1125F PR PAIN SEVERITY QUANTIFIED, PAIN PRESENT: ICD-10-PCS | Mod: HCNC,CPTII,S$GLB, | Performed by: PSYCHIATRY & NEUROLOGY

## 2021-10-26 PROCEDURE — 3075F SYST BP GE 130 - 139MM HG: CPT | Mod: HCNC,CPTII,S$GLB, | Performed by: PSYCHIATRY & NEUROLOGY

## 2021-10-26 PROCEDURE — 3079F PR MOST RECENT DIASTOLIC BLOOD PRESSURE 80-89 MM HG: ICD-10-PCS | Mod: HCNC,CPTII,S$GLB, | Performed by: PSYCHIATRY & NEUROLOGY

## 2021-10-26 PROCEDURE — 1159F MED LIST DOCD IN RCRD: CPT | Mod: HCNC,CPTII,S$GLB, | Performed by: PSYCHIATRY & NEUROLOGY

## 2021-10-26 PROCEDURE — 99214 PR OFFICE/OUTPT VISIT, EST, LEVL IV, 30-39 MIN: ICD-10-PCS | Mod: HCNC,S$GLB,, | Performed by: PSYCHIATRY & NEUROLOGY

## 2021-10-26 PROCEDURE — 1101F PR PT FALLS ASSESS DOC 0-1 FALLS W/OUT INJ PAST YR: ICD-10-PCS | Mod: HCNC,CPTII,S$GLB, | Performed by: PSYCHIATRY & NEUROLOGY

## 2021-10-26 PROCEDURE — 1125F AMNT PAIN NOTED PAIN PRSNT: CPT | Mod: HCNC,CPTII,S$GLB, | Performed by: PSYCHIATRY & NEUROLOGY

## 2021-10-26 PROCEDURE — 4010F PR ACE/ARB THEARPY RXD/TAKEN: ICD-10-PCS | Mod: HCNC,CPTII,S$GLB, | Performed by: PSYCHIATRY & NEUROLOGY

## 2021-10-26 PROCEDURE — 1159F PR MEDICATION LIST DOCUMENTED IN MEDICAL RECORD: ICD-10-PCS | Mod: HCNC,CPTII,S$GLB, | Performed by: PSYCHIATRY & NEUROLOGY

## 2021-10-26 PROCEDURE — 3008F PR BODY MASS INDEX (BMI) DOCUMENTED: ICD-10-PCS | Mod: HCNC,CPTII,S$GLB, | Performed by: PSYCHIATRY & NEUROLOGY

## 2021-10-26 PROCEDURE — 99999 PR PBB SHADOW E&M-EST. PATIENT-LVL III: CPT | Mod: PBBFAC,HCNC,, | Performed by: PSYCHIATRY & NEUROLOGY

## 2021-10-26 PROCEDURE — 4010F ACE/ARB THERAPY RXD/TAKEN: CPT | Mod: HCNC,CPTII,S$GLB, | Performed by: PSYCHIATRY & NEUROLOGY

## 2021-10-26 PROCEDURE — 3079F DIAST BP 80-89 MM HG: CPT | Mod: HCNC,CPTII,S$GLB, | Performed by: PSYCHIATRY & NEUROLOGY

## 2021-10-26 PROCEDURE — 99999 PR PBB SHADOW E&M-EST. PATIENT-LVL III: ICD-10-PCS | Mod: PBBFAC,HCNC,, | Performed by: PSYCHIATRY & NEUROLOGY

## 2021-10-26 PROCEDURE — 1160F RVW MEDS BY RX/DR IN RCRD: CPT | Mod: HCNC,CPTII,S$GLB, | Performed by: PSYCHIATRY & NEUROLOGY

## 2021-10-26 PROCEDURE — 3288F PR FALLS RISK ASSESSMENT DOCUMENTED: ICD-10-PCS | Mod: HCNC,CPTII,S$GLB, | Performed by: PSYCHIATRY & NEUROLOGY

## 2021-10-26 PROCEDURE — 3288F FALL RISK ASSESSMENT DOCD: CPT | Mod: HCNC,CPTII,S$GLB, | Performed by: PSYCHIATRY & NEUROLOGY

## 2021-10-29 ENCOUNTER — TELEPHONE (OUTPATIENT)
Dept: INTERNAL MEDICINE | Facility: CLINIC | Age: 71
End: 2021-10-29
Payer: MEDICARE

## 2021-12-07 ENCOUNTER — HOSPITAL ENCOUNTER (OUTPATIENT)
Dept: RADIOLOGY | Facility: HOSPITAL | Age: 71
Discharge: HOME | End: 2021-12-07
Attending: INTERNAL MEDICINE
Payer: MEDICARE

## 2021-12-07 ENCOUNTER — OFFICE VISIT (OUTPATIENT)
Dept: INTERNAL MEDICINE | Facility: CLINIC | Age: 71
End: 2021-12-07
Payer: MEDICARE

## 2021-12-07 VITALS
BODY MASS INDEX: 53.92 KG/M2 | WEIGHT: 293 LBS | OXYGEN SATURATION: 97 % | HEART RATE: 68 BPM | SYSTOLIC BLOOD PRESSURE: 124 MMHG | DIASTOLIC BLOOD PRESSURE: 76 MMHG | HEIGHT: 62 IN

## 2021-12-07 DIAGNOSIS — M25.542 PAIN IN THUMB JOINT WITH MOVEMENT OF LEFT HAND: Primary | ICD-10-CM

## 2021-12-07 DIAGNOSIS — K21.9 GASTROESOPHAGEAL REFLUX DISEASE, UNSPECIFIED WHETHER ESOPHAGITIS PRESENT: ICD-10-CM

## 2021-12-07 DIAGNOSIS — R06.09 DOE (DYSPNEA ON EXERTION): ICD-10-CM

## 2021-12-07 DIAGNOSIS — R07.89 COSTOCHONDRAL CHEST PAIN: ICD-10-CM

## 2021-12-07 DIAGNOSIS — Z01.89 PATIENT REQUEST FOR DIAGNOSTIC TESTING: ICD-10-CM

## 2021-12-07 DIAGNOSIS — R13.10 DYSPHAGIA, UNSPECIFIED TYPE: ICD-10-CM

## 2021-12-07 DIAGNOSIS — I10 PRIMARY HYPERTENSION: ICD-10-CM

## 2021-12-07 DIAGNOSIS — E66.01 CLASS 3 SEVERE OBESITY DUE TO EXCESS CALORIES WITHOUT SERIOUS COMORBIDITY WITH BODY MASS INDEX (BMI) OF 50.0 TO 59.9 IN ADULT: ICD-10-CM

## 2021-12-07 PROCEDURE — 99999 PR PBB SHADOW E&M-EST. PATIENT-LVL IV: CPT | Mod: PBBFAC,HCNC,, | Performed by: INTERNAL MEDICINE

## 2021-12-07 PROCEDURE — 4010F ACE/ARB THERAPY RXD/TAKEN: CPT | Mod: HCNC,CPTII,S$GLB, | Performed by: INTERNAL MEDICINE

## 2021-12-07 PROCEDURE — 99214 PR OFFICE/OUTPT VISIT, EST, LEVL IV, 30-39 MIN: ICD-10-PCS | Mod: HCNC,S$GLB,, | Performed by: INTERNAL MEDICINE

## 2021-12-07 PROCEDURE — 99999 PR PBB SHADOW E&M-EST. PATIENT-LVL IV: ICD-10-PCS | Mod: PBBFAC,HCNC,, | Performed by: INTERNAL MEDICINE

## 2021-12-07 PROCEDURE — 99214 OFFICE O/P EST MOD 30 MIN: CPT | Mod: HCNC,S$GLB,, | Performed by: INTERNAL MEDICINE

## 2021-12-07 PROCEDURE — 71046 X-RAY EXAM CHEST 2 VIEWS: CPT | Mod: TC,HCNC

## 2021-12-07 PROCEDURE — 71046 X-RAY EXAM CHEST 2 VIEWS: CPT | Mod: 26,HCNC,, | Performed by: RADIOLOGY

## 2021-12-07 PROCEDURE — 99499 UNLISTED E&M SERVICE: CPT | Mod: S$PBB,,, | Performed by: INTERNAL MEDICINE

## 2021-12-07 PROCEDURE — 4010F PR ACE/ARB THEARPY RXD/TAKEN: ICD-10-PCS | Mod: HCNC,CPTII,S$GLB, | Performed by: INTERNAL MEDICINE

## 2021-12-07 PROCEDURE — 99499 RISK ADDL DX/OHS AUDIT: ICD-10-PCS | Mod: S$PBB,,, | Performed by: INTERNAL MEDICINE

## 2021-12-07 PROCEDURE — 71046 XR CHEST PA AND LATERAL: ICD-10-PCS | Mod: 26,HCNC,, | Performed by: RADIOLOGY

## 2021-12-08 ENCOUNTER — LAB VISIT (OUTPATIENT)
Dept: LAB | Facility: HOSPITAL | Age: 71
End: 2021-12-08
Attending: INTERNAL MEDICINE
Payer: MEDICARE

## 2021-12-08 DIAGNOSIS — R06.09 DOE (DYSPNEA ON EXERTION): ICD-10-CM

## 2021-12-08 DIAGNOSIS — Z01.89 PATIENT REQUEST FOR DIAGNOSTIC TESTING: ICD-10-CM

## 2021-12-08 DIAGNOSIS — E66.01 CLASS 3 SEVERE OBESITY DUE TO EXCESS CALORIES WITHOUT SERIOUS COMORBIDITY WITH BODY MASS INDEX (BMI) OF 50.0 TO 59.9 IN ADULT: ICD-10-CM

## 2021-12-08 DIAGNOSIS — I10 PRIMARY HYPERTENSION: ICD-10-CM

## 2021-12-08 LAB
ALBUMIN SERPL BCP-MCNC: 3.2 G/DL (ref 3.5–5.2)
ALP SERPL-CCNC: 92 U/L (ref 55–135)
ALT SERPL W/O P-5'-P-CCNC: 14 U/L (ref 10–44)
ANION GAP SERPL CALC-SCNC: 12 MMOL/L (ref 8–16)
AST SERPL-CCNC: 19 U/L (ref 10–40)
BASOPHILS # BLD AUTO: 0.07 K/UL (ref 0–0.2)
BASOPHILS NFR BLD: 0.8 % (ref 0–1.9)
BILIRUB SERPL-MCNC: 0.3 MG/DL (ref 0.1–1)
BUN SERPL-MCNC: 14 MG/DL (ref 8–23)
CALCIUM SERPL-MCNC: 8.9 MG/DL (ref 8.7–10.5)
CHLORIDE SERPL-SCNC: 104 MMOL/L (ref 95–110)
CO2 SERPL-SCNC: 28 MMOL/L (ref 23–29)
CREAT SERPL-MCNC: 0.9 MG/DL (ref 0.5–1.4)
DIFFERENTIAL METHOD: ABNORMAL
EOSINOPHIL # BLD AUTO: 0.2 K/UL (ref 0–0.5)
EOSINOPHIL NFR BLD: 1.8 % (ref 0–8)
ERYTHROCYTE [DISTWIDTH] IN BLOOD BY AUTOMATED COUNT: 14.6 % (ref 11.5–14.5)
EST. GFR  (AFRICAN AMERICAN): >60 ML/MIN/1.73 M^2
EST. GFR  (NON AFRICAN AMERICAN): >60 ML/MIN/1.73 M^2
ESTIMATED AVG GLUCOSE: 108 MG/DL (ref 68–131)
GLUCOSE SERPL-MCNC: 101 MG/DL (ref 70–110)
HBA1C MFR BLD: 5.4 % (ref 4–5.6)
HCT VFR BLD AUTO: 40.1 % (ref 37–48.5)
HGB BLD-MCNC: 12.3 G/DL (ref 12–16)
IMM GRANULOCYTES # BLD AUTO: 0.02 K/UL (ref 0–0.04)
IMM GRANULOCYTES NFR BLD AUTO: 0.2 % (ref 0–0.5)
LYMPHOCYTES # BLD AUTO: 3.8 K/UL (ref 1–4.8)
LYMPHOCYTES NFR BLD: 42.4 % (ref 18–48)
MCH RBC QN AUTO: 27.3 PG (ref 27–31)
MCHC RBC AUTO-ENTMCNC: 30.7 G/DL (ref 32–36)
MCV RBC AUTO: 89 FL (ref 82–98)
MONOCYTES # BLD AUTO: 0.8 K/UL (ref 0.3–1)
MONOCYTES NFR BLD: 9.2 % (ref 4–15)
NEUTROPHILS # BLD AUTO: 4.1 K/UL (ref 1.8–7.7)
NEUTROPHILS NFR BLD: 45.6 % (ref 38–73)
NRBC BLD-RTO: 0 /100 WBC
PLATELET # BLD AUTO: 308 K/UL (ref 150–450)
PMV BLD AUTO: 10.9 FL (ref 9.2–12.9)
POTASSIUM SERPL-SCNC: 3.8 MMOL/L (ref 3.5–5.1)
PROT SERPL-MCNC: 7.5 G/DL (ref 6–8.4)
RBC # BLD AUTO: 4.51 M/UL (ref 4–5.4)
SARS-COV-2 IGG SERPL IA-ACNC: <50 AU/ML
SARS-COV-2 IGG SERPL QL IA: NEGATIVE
SODIUM SERPL-SCNC: 144 MMOL/L (ref 136–145)
WBC # BLD AUTO: 8.94 K/UL (ref 3.9–12.7)

## 2021-12-08 PROCEDURE — 85025 COMPLETE CBC W/AUTO DIFF WBC: CPT | Mod: HCNC | Performed by: INTERNAL MEDICINE

## 2021-12-08 PROCEDURE — 36415 COLL VENOUS BLD VENIPUNCTURE: CPT | Mod: HCNC | Performed by: INTERNAL MEDICINE

## 2021-12-08 PROCEDURE — 86769 SARS-COV-2 COVID-19 ANTIBODY: CPT | Mod: HCNC | Performed by: INTERNAL MEDICINE

## 2021-12-08 PROCEDURE — 83036 HEMOGLOBIN GLYCOSYLATED A1C: CPT | Mod: HCNC | Performed by: INTERNAL MEDICINE

## 2021-12-08 PROCEDURE — 80053 COMPREHEN METABOLIC PANEL: CPT | Mod: HCNC | Performed by: INTERNAL MEDICINE

## 2021-12-13 ENCOUNTER — TELEPHONE (OUTPATIENT)
Dept: INTERNAL MEDICINE | Facility: CLINIC | Age: 71
End: 2021-12-13
Payer: MEDICARE

## 2021-12-13 ENCOUNTER — PATIENT OUTREACH (OUTPATIENT)
Dept: ADMINISTRATIVE | Facility: HOSPITAL | Age: 71
End: 2021-12-13
Payer: MEDICARE

## 2021-12-14 ENCOUNTER — TELEPHONE (OUTPATIENT)
Dept: INTERNAL MEDICINE | Facility: CLINIC | Age: 71
End: 2021-12-14
Payer: MEDICARE

## 2021-12-15 ENCOUNTER — TELEPHONE (OUTPATIENT)
Dept: INTERNAL MEDICINE | Facility: CLINIC | Age: 71
End: 2021-12-15
Payer: MEDICARE

## 2021-12-15 NOTE — TELEPHONE ENCOUNTER
----- Message from Anali Pineda sent at 12/15/2021 12:43 PM CST -----  Contact: Self   Pt is requesting a call to schedule swallow test. Pt would like to be schedule this week around 1pm or after. Please advise

## 2021-12-16 ENCOUNTER — TELEPHONE (OUTPATIENT)
Dept: INTERNAL MEDICINE | Facility: CLINIC | Age: 71
End: 2021-12-16
Payer: MEDICARE

## 2021-12-17 ENCOUNTER — HOSPITAL ENCOUNTER (OUTPATIENT)
Dept: PULMONOLOGY | Facility: CLINIC | Age: 71
Discharge: HOME | End: 2021-12-17
Payer: MEDICARE

## 2021-12-17 DIAGNOSIS — Z01.818 PRE-PROCEDURAL EXAMINATION: Primary | ICD-10-CM

## 2021-12-17 DIAGNOSIS — R06.09 DOE (DYSPNEA ON EXERTION): ICD-10-CM

## 2021-12-17 LAB — SARS-COV-2 RDRP RESP QL NAA+PROBE: NEGATIVE

## 2021-12-17 PROCEDURE — U0002 COVID-19 LAB TEST NON-CDC: HCPCS | Mod: HCNC | Performed by: INTERNAL MEDICINE

## 2021-12-20 ENCOUNTER — HOSPITAL ENCOUNTER (OUTPATIENT)
Dept: PULMONOLOGY | Facility: CLINIC | Age: 71
Discharge: HOME | End: 2021-12-20
Payer: MEDICARE

## 2021-12-20 DIAGNOSIS — R05.9 COUGH: ICD-10-CM

## 2021-12-20 DIAGNOSIS — E66.2 MORBID (SEVERE) OBESITY WITH ALVEOLAR HYPOVENTILATION: ICD-10-CM

## 2021-12-20 PROCEDURE — 94729 PR C02/MEMBANE DIFFUSE CAPACITY: ICD-10-PCS | Mod: HCNC,S$GLB,, | Performed by: INTERNAL MEDICINE

## 2021-12-20 PROCEDURE — 94729 DIFFUSING CAPACITY: CPT | Mod: HCNC,S$GLB,, | Performed by: INTERNAL MEDICINE

## 2021-12-20 PROCEDURE — 94060 EVALUATION OF WHEEZING: CPT | Mod: HCNC,S$GLB,, | Performed by: INTERNAL MEDICINE

## 2021-12-20 PROCEDURE — 94727 GAS DIL/WSHOT DETER LNG VOL: CPT | Mod: HCNC,S$GLB,, | Performed by: INTERNAL MEDICINE

## 2021-12-20 PROCEDURE — 94060 PR EVAL OF BRONCHOSPASM: ICD-10-PCS | Mod: HCNC,S$GLB,, | Performed by: INTERNAL MEDICINE

## 2021-12-20 PROCEDURE — 94727 PR PULM FUNCTION TEST BY GAS: ICD-10-PCS | Mod: HCNC,S$GLB,, | Performed by: INTERNAL MEDICINE

## 2021-12-30 ENCOUNTER — HOSPITAL ENCOUNTER (OUTPATIENT)
Dept: RADIOLOGY | Facility: HOSPITAL | Age: 71
Discharge: HOME | End: 2021-12-30
Attending: INTERNAL MEDICINE
Payer: MEDICARE

## 2021-12-30 DIAGNOSIS — R13.10 DYSPHAGIA, UNSPECIFIED TYPE: ICD-10-CM

## 2021-12-30 PROCEDURE — A9698 NON-RAD CONTRAST MATERIALNOC: HCPCS | Mod: HCNC | Performed by: INTERNAL MEDICINE

## 2021-12-30 PROCEDURE — 74220 X-RAY XM ESOPHAGUS 1CNTRST: CPT | Mod: 26,HCNC,, | Performed by: RADIOLOGY

## 2021-12-30 PROCEDURE — 74220 FL ESOPHAGRAM COMPLETE: ICD-10-PCS | Mod: 26,HCNC,, | Performed by: RADIOLOGY

## 2021-12-30 PROCEDURE — 25500020 PHARM REV CODE 255: Mod: HCNC | Performed by: INTERNAL MEDICINE

## 2021-12-30 PROCEDURE — 74220 X-RAY XM ESOPHAGUS 1CNTRST: CPT | Mod: TC,HCNC

## 2021-12-30 RX ADMIN — BARIUM SULFATE 280 ML: 0.6 SUSPENSION ORAL at 01:12

## 2022-01-04 ENCOUNTER — HOSPITAL ENCOUNTER (OUTPATIENT)
Dept: RADIOLOGY | Facility: HOSPITAL | Age: 72
Discharge: HOME | End: 2022-01-04
Attending: INTERNAL MEDICINE
Payer: MEDICARE

## 2022-01-04 DIAGNOSIS — Z12.31 ENCOUNTER FOR SCREENING MAMMOGRAM FOR MALIGNANT NEOPLASM OF BREAST: ICD-10-CM

## 2022-01-04 PROCEDURE — 77063 MAMMO DIGITAL SCREENING BILAT WITH TOMO: ICD-10-PCS | Mod: 26,,, | Performed by: RADIOLOGY

## 2022-01-04 PROCEDURE — 77063 BREAST TOMOSYNTHESIS BI: CPT | Mod: TC,PO

## 2022-01-04 PROCEDURE — 77067 MAMMO DIGITAL SCREENING BILAT WITH TOMO: ICD-10-PCS | Mod: 26,,, | Performed by: RADIOLOGY

## 2022-01-04 PROCEDURE — 77067 SCR MAMMO BI INCL CAD: CPT | Mod: TC,PO

## 2022-01-04 PROCEDURE — 77067 SCR MAMMO BI INCL CAD: CPT | Mod: 26,,, | Performed by: RADIOLOGY

## 2022-01-04 PROCEDURE — 77063 BREAST TOMOSYNTHESIS BI: CPT | Mod: 26,,, | Performed by: RADIOLOGY

## 2022-01-18 DIAGNOSIS — I10 ESSENTIAL HYPERTENSION, BENIGN: ICD-10-CM

## 2022-01-18 NOTE — TELEPHONE ENCOUNTER
No new care gaps identified.  Powered by [employer]. Reference number: [id number].   1/18/2022 5:41:41 PM CST

## 2022-01-18 NOTE — TELEPHONE ENCOUNTER
----- Message from Flory Burt sent at 1/18/2022 10:13 AM CST -----  Contact: Patient [phone]  Requesting an RX refill or new RX.  Is this a refill or new RX:   RX name and strength losartan-hydrochlorothiazide 50-12.5 mg (HYZAAR) 50-12.5 mg per tablet  Is this a 30 day or 90 day RX:   Patient advised that in the future they can use their [facility] account to request a refill?:  yes  Patient advised that RX refills can take up to 72 hours?:  yes  Pharmacy name and phone # (copy/paste from chart):[facility] - [place], [place] - [address] [facility], [address]    Comments:

## 2022-01-19 RX ORDER — LOSARTAN POTASSIUM AND HYDROCHLOROTHIAZIDE 12.5; 5 MG/1; MG/1
1 TABLET ORAL DAILY
Qty: 90 TABLET | Refills: 1 | Status: SHIPPED | OUTPATIENT
Start: 2022-01-19 | End: 2022-01-20 | Stop reason: SDUPTHER

## 2022-01-20 DIAGNOSIS — I10 ESSENTIAL HYPERTENSION, BENIGN: ICD-10-CM

## 2022-01-20 RX ORDER — LOSARTAN POTASSIUM AND HYDROCHLOROTHIAZIDE 12.5; 5 MG/1; MG/1
1 TABLET ORAL DAILY
Qty: 90 TABLET | Refills: 1 | Status: SHIPPED | OUTPATIENT
Start: 2022-01-20 | End: 2022-09-16

## 2022-01-20 NOTE — TELEPHONE ENCOUNTER
No new care gaps identified.  Powered by [employer]. Reference number: [id number].   1/20/2022 11:20:53 AM CST

## 2022-01-20 NOTE — TELEPHONE ENCOUNTER
----- Message from Javier Turner sent at 1/20/2022 10:56 AM CST -----  Contact: [phone] pt  Requesting an RX refill or new RX.  Is this a refill or new RX: new  RX name and strength (copy/paste from chart):  losartan-hydrochlorothiazide 50-12.5 mg (HYZAAR) 50-12.5 mg per tablet 90 tablet   Is this a 30 day or 90 day RX: 90  Patient advised that in the future they can use their [facility] account to request a refill?:  call back  Patient advised that RX refills can take up to 72 hours?:  yes  Pharmacy name and phone # (copy/paste from chart):  [id number], [address] [place] [address]  Comments: Please Advise

## 2022-01-20 NOTE — TELEPHONE ENCOUNTER
Pt want medication to go to [facility] and not [facility].  [id number], [address] [place] [address]    Refill Request.

## 2022-01-24 DIAGNOSIS — K21.9 GASTROESOPHAGEAL REFLUX DISEASE WITHOUT ESOPHAGITIS: ICD-10-CM

## 2022-01-24 NOTE — TELEPHONE ENCOUNTER
No new care gaps identified.  Powered by [employer] Reference number: [id number].   1/24/2022 8:37:38 AM CST

## 2022-01-24 NOTE — TELEPHONE ENCOUNTER
----- Message from Aleyda Casanova sent at 1/24/2022  8:23 AM CST -----  Contact: Ni   Requesting an RX refill or new RX.  Is this a refill or new RX: refill   RX name and strength (copy/paste from chart):  Omeprazole 40 mg   Is this a 30 day or 90 day RX: 90 day   Patient advised that in the future they can use their [facility] account to request a refill?:yes   Patient advised that RX refills can take up to 72 hours?: Yes   Pharmacy name and phone # (copy/paste from chart):  [facility]  Comments:

## 2022-02-09 RX ORDER — OMEPRAZOLE 40 MG/1
40 CAPSULE, DELAYED RELEASE ORAL DAILY
Qty: 90 CAPSULE | Refills: 3 | Status: SHIPPED | OUTPATIENT
Start: 2022-02-09 | End: 2023-04-12 | Stop reason: SDUPTHER

## 2022-02-09 NOTE — TELEPHONE ENCOUNTER
Refill Authorization Note   Ni Montejo  is requesting a refill authorization.  Brief Assessment and Rationale for Refill:  Approve     Medication Therapy Plan:       Medication Reconciliation Completed: No   Comments:   --->Care Gap information included below if applicable.       Requested Prescriptions   Pending Prescriptions Disp Refills    omeprazole (PRILOSEC) 40 MG capsule 90 capsule 3     Sig: Take 1 capsule (40 mg total) by mouth once daily.       Gastroenterology: Proton Pump Inhibitors Passed - 1/24/2022  8:37 AM        Passed - Patient is at least 18 years old        Passed - Osteoporosis is not on problem list        Passed - Plavix is not on active medication list        Passed - An appropriate indication is on the problem list        Passed - Valid encounter within last 15 months     Recent Visits  Date Type Provider Dept   12/07/21 Office Visit Giovanni Chi MD [facility] Internal Medicine   10/15/21 Office Visit Giovanni Chi MD [facility] Internal Medicine   06/18/21 Office Visit Giovanni Chi MD [facility] Internal [facility]   03/18/21 Office Visit Giovanni Chi MD [facility] Internal [facility]   12/29/20 Office Visit Giovanni Chi MD [facility]   12/21/20 Office Visit Giovanni Chi MD [facility] Internal Medicine   08/24/20 Office Visit Giovanni Chi MD [facility] Internal [facility]   08/06/20 Office Visit Giovanni Chi MD [facility] Internal Medicine   Showing recent visits within past 720 days and meeting all other requirements  Future Appointments  No visits were found meeting these conditions.  Showing future appointments within next 150 days and meeting all other requirements                    Appointments  past 12m or future 3m with PCP    Date Provider   Last Visit   12/7/2021 Giovanni Chi MD   Next Visit   Visit date not found Giovanni Chi MD   ED visits in past 90 days: 0     Note composed:5:41 AM 02/09/2022

## 2022-03-21 ENCOUNTER — TELEPHONE (OUTPATIENT)
Dept: SLEEP MEDICINE | Facility: CLINIC | Age: 72
End: 2022-03-21
Payer: MEDICARE

## 2022-03-21 NOTE — TELEPHONE ENCOUNTER
----- Message from Ewelina Kaiser MA sent at 3/18/2022  4:51 PM CDT -----  Regarding: FW: Pt Advice  Contact: GIOVANNA LUNDBERG [[id number]]    ----- Message -----  From: Jonny Farnsworth  Sent: 3/18/2022   4:50 PM CDT  To: [facility] Staff  Subject: Pt Advice                                        Name of Who is Calling: GIOVANNA LUNDBERG [[id number]]      What is the request in detail: Would like to speak with staff in regards to a letter that states Dr. Barfield just order a machine. States it was after January, after she changed her insurance to [employer]. Please advise, wants to know why would she order it when she knows she is not her doctor. Also, been had the machine and getting supplies under Dr. Ahuja.       Can the clinic reply by [facility]: no      What Number [facility] Back [facility]: [phone]

## 2022-03-23 DIAGNOSIS — G47.00 INSOMNIA, UNSPECIFIED TYPE: ICD-10-CM

## 2022-03-23 RX ORDER — TRAZODONE HYDROCHLORIDE 50 MG/1
50 TABLET ORAL DAILY
Qty: 90 TABLET | Refills: 0 | Status: SHIPPED | OUTPATIENT
Start: 2022-03-23 | End: 2022-03-28 | Stop reason: SDUPTHER

## 2022-03-23 NOTE — TELEPHONE ENCOUNTER
----- Message from Chente Vidales sent at 3/23/2022 10:00 AM CDT -----  Contact: [phone]  Requesting an RX refill or new RX.  Is this a refill or new RX: refill    RX name and strength (copy/paste from chart):  traZODone (DESYREL) 50 MG tablet    Is this a 30 day or 90 day RX: 90    Pharmacy name and phone # (copy/paste from chart):      [facility] - [address], [address], [address] [address]  Phone: [phone] Fax: [phone]    The doctors have asked that we provide their patients with the following 2 reminders -- prescription refills can take up to 72 hours, and a friendly reminder that in the future you can use your [facility] account to request refills:

## 2022-03-23 NOTE — TELEPHONE ENCOUNTER
No new care gaps identified.  Powered by [employer]. Reference number: [id number].   3/23/2022 10:33:03 AM CDT

## 2022-03-28 DIAGNOSIS — G47.00 INSOMNIA, UNSPECIFIED TYPE: ICD-10-CM

## 2022-03-28 RX ORDER — TRAZODONE HYDROCHLORIDE 50 MG/1
50 TABLET ORAL DAILY
Qty: 90 TABLET | Refills: 0 | Status: SHIPPED | OUTPATIENT
Start: 2022-03-28 | End: 2022-07-07 | Stop reason: SDUPTHER

## 2022-03-28 RX ORDER — ROSUVASTATIN CALCIUM 20 MG/1
20 TABLET, COATED ORAL NIGHTLY
Qty: 90 TABLET | Refills: 3 | Status: SHIPPED | OUTPATIENT
Start: 2022-03-28 | End: 2023-02-02 | Stop reason: SDUPTHER

## 2022-04-04 ENCOUNTER — TELEPHONE (OUTPATIENT)
Dept: SLEEP MEDICINE | Facility: CLINIC | Age: 72
End: 2022-04-04
Payer: MEDICARE

## 2022-04-04 NOTE — TELEPHONE ENCOUNTER
----- Message from Ewelina Kaiser MA sent at 3/31/2022 12:40 PM CDT -----  Regarding: FW: complaint    ----- Message -----  From: Kelly Kendrick  Sent: 3/31/2022   9:37 AM CDT  To: [facility] Staff  Subject: complaint                                        Name of Who is Calling: Dahiana, [facility]           What is the request in detail: Dahiana is requesting a call back in regards to a complaint about the authorization of a CPAP without her knowledge.            Can the clinic reply by [facility]: No           What Number to Call Back if not in [facility]: [phone]

## 2022-06-07 ENCOUNTER — TELEPHONE (OUTPATIENT)
Dept: INTERNAL MEDICINE | Facility: CLINIC | Age: 72
End: 2022-06-07
Payer: MEDICARE

## 2022-06-07 NOTE — TELEPHONE ENCOUNTER
----- Message from Bonita Servin sent at 6/7/2022  8:41 AM CDT -----  Contact: [phone]  Pt states her memory is getting pretty bad and she bought neuriva and is asking if she should take this with all her medications please advise and give return call

## 2022-06-10 ENCOUNTER — TELEPHONE (OUTPATIENT)
Dept: INTERNAL MEDICINE | Facility: CLINIC | Age: 72
End: 2022-06-10
Payer: MEDICARE

## 2022-06-10 NOTE — TELEPHONE ENCOUNTER
----- Message from Stella Pacheco sent at 6/10/2022  9:07 AM CDT -----  Contact: [phone]  Pt called to advise that she sent a message on Tuesday and has yet to receive a response. She is requesting that someone calls her as soon as possible with a response. Pt states her memory is getting pretty bad and she bought neuriva and is asking if she should take this with all her medications please advise and give return call. Pt also would like to get an appointment with the provider for clearance for cataract surgery as well as some information about home testing for C-19. Please Advise

## 2022-06-13 ENCOUNTER — TELEPHONE (OUTPATIENT)
Dept: INTERNAL MEDICINE | Facility: CLINIC | Age: 72
End: 2022-06-13
Payer: MEDICARE

## 2022-06-14 ENCOUNTER — TELEPHONE (OUTPATIENT)
Dept: INTERNAL MEDICINE | Facility: CLINIC | Age: 72
End: 2022-06-14
Payer: MEDICARE

## 2022-06-14 NOTE — TELEPHONE ENCOUNTER
Called and spoke to pt. Pt states this is second message to PCP requesting advice about Neuriva. Pt states her memory has progressively gotten worse and she is trying to be proactive. She wants to add the Neuriva to her daily regimen but want to know if with have any reactions with the medications she is currently on.

## 2022-06-14 NOTE — TELEPHONE ENCOUNTER
----- Message from Jia Haro sent at 6/14/2022  9:42 AM CDT -----  Contact: Ni self  [phone]  1MEDICALADVICE     Patient is calling for Medical Advice regarding:    How long has patient had these symptoms:    Pharmacy name and phone#:    Would like response via Punch[facility]: call back    Comments: Pt is requesting a call back from Anali

## 2022-06-15 NOTE — TELEPHONE ENCOUNTER
Called ans spoke to pt. Relayed following message to pt from PCP:    Supplement she is requesting is a scam.  They were part of a class action lawsuit for falls advertising.  It is essentially B complex vitamin.      No medical contraindication, however, do not suspect any significant clinical efficacy    Pt verbalized understanding. Pt would like to know PCP thoughts on Prevagen or any other medication that can improve pt memory.

## 2022-06-22 RX ORDER — BENZONATATE 100 MG/1
100 CAPSULE ORAL 3 TIMES DAILY PRN
Qty: 30 CAPSULE | Refills: 0 | Status: SHIPPED | OUTPATIENT
Start: 2022-06-22 | End: 2022-10-13 | Stop reason: SDUPTHER

## 2022-06-22 NOTE — TELEPHONE ENCOUNTER
----- Message from Jia Haro sent at 6/22/2022  8:40 AM CDT -----  Contact: Ni self [phone]  Requesting an RX refill or new RX.  Is this a refill or new RX: refill  RX name and strength:  benzonatate (TESSALON) 200 MG capsule  Is this a 30 day or 90 day RX:   Pharmacy name and phone #   [facility] [address] [place] [address]   Phone: [phone]  The doctors have asked that we provide their patients with the following 2 reminders -- prescription refills can take up to 72 hours, and a friendly reminder that in the future you can use your [facility] account to request refills:

## 2022-06-29 ENCOUNTER — TELEPHONE (OUTPATIENT)
Dept: INTERNAL MEDICINE | Facility: CLINIC | Age: 72
End: 2022-06-29
Payer: MEDICARE

## 2022-06-29 NOTE — TELEPHONE ENCOUNTER
Called and spoke to pt. Pt asking for assistance in taking at home covid test. Walked pt through the at home covid test. Awaiting results. Pt states she doesn't have symptoms but did go out in public so wanted to be sure.

## 2022-06-29 NOTE — TELEPHONE ENCOUNTER
----- Message from Tom Hannah sent at 6/29/2022  8:31 AM CDT -----  Contact: [phone]  Pt would like a call back about taking a home test for Covid.

## 2022-07-05 DIAGNOSIS — G47.00 INSOMNIA, UNSPECIFIED TYPE: ICD-10-CM

## 2022-07-05 RX ORDER — TRAZODONE HYDROCHLORIDE 50 MG/1
50 TABLET ORAL DAILY
Qty: 90 TABLET | Refills: 0 | Status: CANCELLED | OUTPATIENT
Start: 2022-07-05

## 2022-07-05 NOTE — TELEPHONE ENCOUNTER
Care Due:                  Date            Visit Type   Department     Provider  --------------------------------------------------------------------------------                                EP -                              PRIMARY      [facility] INTERNAL  Last Visit: 12-      CARE ([facility])   MEDICINE       DAGMAR ABDALLA                              EP -                              PRIMARY      [facility] INTERNAL  Next Visit: 10-      CARE ([facility])   [facility]       DAGMAR ABDALLA                                                            Last  Test          Frequency    Reason                     Performed    Due Date  --------------------------------------------------------------------------------    Lipid Panel.  12 months..  rosuvastatin.............  02- 02-    [employer] Embedded Care Gaps. Reference number: [id number]. 7/05/2022   9:36:47 AM CDT

## 2022-07-05 NOTE — TELEPHONE ENCOUNTER
----- Message from Bonita Servin sent at 7/5/2022  8:16 AM CDT -----  Contact: [phone]  Pt is calling she is needing this and she states she is also needing to speak with the nurse please advise and give return call she is alos stating she is needing her refill right away     Requesting an RX refill or new RX.  Is this a refill or new RX: refill  RX name and strength (copy/paste from chart):  traZODone (DESYREL) 50 MG tablet  Is this a 30 day or 90 day RX: 30  Pharmacy name and phone # (copy/paste from chart):        [id number] [address] AT [facility] [address]  [address]  [facility] [address]  Phone: [phone] Fax: [phone]       The doctors have asked that we provide their patients with the following 2 reminders -- prescription refills can take up to 72 hours, and a friendly reminder that in the future you can use your [facility] account to request refills: yes

## 2022-07-06 DIAGNOSIS — G47.00 INSOMNIA, UNSPECIFIED TYPE: ICD-10-CM

## 2022-07-06 RX ORDER — TRAZODONE HYDROCHLORIDE 50 MG/1
50 TABLET ORAL DAILY
Qty: 90 TABLET | Refills: 0 | Status: CANCELLED | OUTPATIENT
Start: 2022-07-06

## 2022-07-06 NOTE — TELEPHONE ENCOUNTER
No new care gaps identified.  [facility] Embedded Care Gaps. Reference number: [id number]. 7/06/2022   2:27:42 PM CDT

## 2022-07-06 NOTE — TELEPHONE ENCOUNTER
----- Message from Ana Paula Hung sent at 7/6/2022  2:10 PM CDT -----  Contact: [phone]  Requesting an RX refill or new RX.  Is this a refill or new RX: refill 1  RX name and strength (copy/paste from chart):  traZODone (DESYREL) 50 MG tablet  Is this a 30 day or 90 day RX: 30  Pharmacy name and phone # (copy/paste from chart):          [id number] - [place], [place] - [address] AT [facility] [address]  [address]  [address]  [facility] [address]  Phone: [phone] Fax: [phone]

## 2022-07-07 DIAGNOSIS — G47.00 INSOMNIA, UNSPECIFIED TYPE: ICD-10-CM

## 2022-07-07 RX ORDER — TRAZODONE HYDROCHLORIDE 50 MG/1
50 TABLET ORAL DAILY
Qty: 90 TABLET | Refills: 0 | Status: SHIPPED | OUTPATIENT
Start: 2022-07-07 | End: 2022-10-03

## 2022-07-07 NOTE — TELEPHONE ENCOUNTER
No new care gaps identified.  [facility] Embedded Care Gaps. Reference number: [id number]. 7/07/2022   10:36:35 AM RICKYT

## 2022-07-07 NOTE — TELEPHONE ENCOUNTER
----- Message from Bonita Servin sent at 7/7/2022 10:25 AM CDT -----  Contact: [phone]  Pt states she has called 2 times for this and she is completely out she is needing this today please give return call     Requesting an RX refill or new RX.  Is this a refill or new RX: refill  RX name and strength (copy/paste from chart): traZODone (DESYREL) 50 MG tablet   Is this a 30 day or 90 day RX: 30  Pharmacy name and phone # (copy/paste from chart):      [id number] - [place], [place] - [address] AT [facility] [address]  [facility]  [facility] [address]  Phone: [phone] Fax: [phone]    The doctors have asked that we provide their patients with the following 2 reminders -- prescription refills can take up to 72 hours, and a friendly reminder that in the future you can use your [facility] account to request refills: yes

## 2022-08-15 DIAGNOSIS — R05.8 UPPER AIRWAY COUGH SYNDROME: ICD-10-CM

## 2022-08-15 NOTE — TELEPHONE ENCOUNTER
No new care gaps identified.  [facility] Embedded Care Gaps. Reference number: [id number]. 8/15/2022   8:28:05 AM RICKYT

## 2022-08-15 NOTE — TELEPHONE ENCOUNTER
----- Message from Bonita Servin sent at 8/15/2022  8:15 AM CDT -----  Contact: [phone]  Pt is calling for the nurse and she is asking for a call back she is out of this she is out of this please refill she states you do not have to call her back but she is needing this she is completely out     Requesting an RX refill or new RX.  Is this a refill or new RX: refill  RX name and strength (copy/paste from chart):  albuterol (PROVENTIL/VENTOLIN HFA) 90 mcg/actuation inhaler  Is this a 30 day or 90 day RX: 30  Pharmacy name and phone # (copy/paste from chart):  [id number] [address] AT [facility] [address]  [address]  [place] [address]  Phone: [phone] Fax: [phone]       The doctors have asked that we provide their patients with the following 2 reminders -- prescription refills can take up to 72 hours, and a friendly reminder that in the future you can use your [facility] account to request refills: yes

## 2022-08-16 ENCOUNTER — TELEPHONE (OUTPATIENT)
Dept: INTERNAL MEDICINE | Facility: CLINIC | Age: 72
End: 2022-08-16
Payer: MEDICARE

## 2022-08-16 RX ORDER — ALBUTEROL SULFATE 90 UG/1
2 AEROSOL, METERED RESPIRATORY (INHALATION) EVERY 6 HOURS PRN
Qty: 54 G | Refills: 1 | Status: SHIPPED | OUTPATIENT
Start: 2022-08-16 | End: 2024-01-22 | Stop reason: SDUPTHER

## 2022-08-16 NOTE — TELEPHONE ENCOUNTER
Refill Decision Note   Ni Montejo  is requesting a refill authorization.  Brief Assessment and Rationale for Refill:  Approve     Medication Therapy Plan:  FOV;    Medication Reconciliation Completed: No   Comments:     No Care Gaps recommended.     Note composed:9:41 AM 08/16/2022

## 2022-08-16 NOTE — TELEPHONE ENCOUNTER
----- Message from Bonita Servin sent at 8/16/2022  9:18 AM CDT -----  Contact: [phone]  Pt states she called for this yesterday and she is out and needs this please give return call when this has been sent so she knows     Requesting an RX refill or new RX.  Is this a refill or new RX: refill  RX name and strength (copy/paste from chart):  albuterol (PROVENTIL/VENTOLIN HFA) 90 mcg/actuation inhaler  Is this a 30 day or 90 day RX: 30  Pharmacy name and phone # (copy/paste from chart):    [id number] [address] AT [facility] [address]  [address]  [facility] [address]  Phone: [phone] Fax: [phone]     The doctors have asked that we provide their patients with the following 2 reminders -- prescription refills can take up to 72 hours, and a friendly reminder that in the future you can use your [facility] account to request refills:yes

## 2022-08-31 DIAGNOSIS — Z78.0 MENOPAUSE: ICD-10-CM

## 2022-09-20 DIAGNOSIS — I10 ESSENTIAL HYPERTENSION, BENIGN: ICD-10-CM

## 2022-09-20 RX ORDER — LOSARTAN POTASSIUM AND HYDROCHLOROTHIAZIDE 12.5; 5 MG/1; MG/1
TABLET ORAL
Qty: 90 TABLET | Refills: 0 | OUTPATIENT
Start: 2022-09-20

## 2022-09-20 NOTE — TELEPHONE ENCOUNTER
No new care gaps identified.  [facility] Embedded Care Gaps. Reference number: [id number]. 9/20/2022   3:35:36 PM CDT

## 2022-09-20 NOTE — TELEPHONE ENCOUNTER
Quick DC. Request already responded to by other means (e.g. phone or fax)   Refill Authorization Note   Ni Obrienkimo  is requesting a refill authorization.  Brief Assessment and Rationale for Refill:  Quick Discontinue  Medication Therapy Plan:  Signed 9/16/22 by PCP    Medication Reconciliation Completed:  No      Comments:     Note composed:4:20 PM 09/20/2022

## 2022-10-13 ENCOUNTER — OFFICE VISIT (OUTPATIENT)
Dept: INTERNAL MEDICINE | Facility: CLINIC | Age: 72
End: 2022-10-13
Payer: MEDICARE

## 2022-10-13 VITALS
WEIGHT: 288 LBS | HEART RATE: 62 BPM | OXYGEN SATURATION: 97 % | DIASTOLIC BLOOD PRESSURE: 72 MMHG | SYSTOLIC BLOOD PRESSURE: 136 MMHG | BODY MASS INDEX: 53 KG/M2 | HEIGHT: 62 IN

## 2022-10-13 DIAGNOSIS — J30.2 SEASONAL ALLERGIES: ICD-10-CM

## 2022-10-13 DIAGNOSIS — E66.01 CLASS 3 SEVERE OBESITY DUE TO EXCESS CALORIES WITHOUT SERIOUS COMORBIDITY WITH BODY MASS INDEX (BMI) OF 50.0 TO 59.9 IN ADULT: ICD-10-CM

## 2022-10-13 DIAGNOSIS — R05.9 COUGH, UNSPECIFIED TYPE: ICD-10-CM

## 2022-10-13 DIAGNOSIS — Z01.89 PATIENT REQUEST FOR DIAGNOSTIC TESTING: ICD-10-CM

## 2022-10-13 DIAGNOSIS — I10 ESSENTIAL HYPERTENSION, BENIGN: Primary | ICD-10-CM

## 2022-10-13 DIAGNOSIS — G47.00 INSOMNIA, UNSPECIFIED TYPE: ICD-10-CM

## 2022-10-13 PROCEDURE — 1101F PR PT FALLS ASSESS DOC 0-1 FALLS W/OUT INJ PAST YR: ICD-10-PCS | Mod: CPTII,S$GLB,, | Performed by: INTERNAL MEDICINE

## 2022-10-13 PROCEDURE — 99214 PR OFFICE/OUTPT VISIT, EST, LEVL IV, 30-39 MIN: ICD-10-PCS | Mod: S$GLB,,, | Performed by: INTERNAL MEDICINE

## 2022-10-13 PROCEDURE — 3075F SYST BP GE 130 - 139MM HG: CPT | Mod: CPTII,S$GLB,, | Performed by: INTERNAL MEDICINE

## 2022-10-13 PROCEDURE — 3075F PR MOST RECENT SYSTOLIC BLOOD PRESS GE 130-139MM HG: ICD-10-PCS | Mod: CPTII,S$GLB,, | Performed by: INTERNAL MEDICINE

## 2022-10-13 PROCEDURE — 3078F DIAST BP <80 MM HG: CPT | Mod: CPTII,S$GLB,, | Performed by: INTERNAL MEDICINE

## 2022-10-13 PROCEDURE — 3288F FALL RISK ASSESSMENT DOCD: CPT | Mod: CPTII,S$GLB,, | Performed by: INTERNAL MEDICINE

## 2022-10-13 PROCEDURE — 99999 PR PBB SHADOW E&M-EST. PATIENT-LVL IV: ICD-10-PCS | Mod: PBBFAC,,, | Performed by: INTERNAL MEDICINE

## 2022-10-13 PROCEDURE — 1101F PT FALLS ASSESS-DOCD LE1/YR: CPT | Mod: CPTII,S$GLB,, | Performed by: INTERNAL MEDICINE

## 2022-10-13 PROCEDURE — 1126F PR PAIN SEVERITY QUANTIFIED, NO PAIN PRESENT: ICD-10-PCS | Mod: CPTII,S$GLB,, | Performed by: INTERNAL MEDICINE

## 2022-10-13 PROCEDURE — 99499 UNLISTED E&M SERVICE: CPT | Mod: S$GLB,,, | Performed by: INTERNAL MEDICINE

## 2022-10-13 PROCEDURE — 1160F PR REVIEW ALL MEDS BY PRESCRIBER/CLIN PHARMACIST DOCUMENTED: ICD-10-PCS | Mod: CPTII,S$GLB,, | Performed by: INTERNAL MEDICINE

## 2022-10-13 PROCEDURE — 1159F MED LIST DOCD IN RCRD: CPT | Mod: CPTII,S$GLB,, | Performed by: INTERNAL MEDICINE

## 2022-10-13 PROCEDURE — 1159F PR MEDICATION LIST DOCUMENTED IN MEDICAL RECORD: ICD-10-PCS | Mod: CPTII,S$GLB,, | Performed by: INTERNAL MEDICINE

## 2022-10-13 PROCEDURE — 1160F RVW MEDS BY RX/DR IN RCRD: CPT | Mod: CPTII,S$GLB,, | Performed by: INTERNAL MEDICINE

## 2022-10-13 PROCEDURE — 99214 OFFICE O/P EST MOD 30 MIN: CPT | Mod: S$GLB,,, | Performed by: INTERNAL MEDICINE

## 2022-10-13 PROCEDURE — 3288F PR FALLS RISK ASSESSMENT DOCUMENTED: ICD-10-PCS | Mod: CPTII,S$GLB,, | Performed by: INTERNAL MEDICINE

## 2022-10-13 PROCEDURE — 1126F AMNT PAIN NOTED NONE PRSNT: CPT | Mod: CPTII,S$GLB,, | Performed by: INTERNAL MEDICINE

## 2022-10-13 PROCEDURE — 99999 PR PBB SHADOW E&M-EST. PATIENT-LVL IV: CPT | Mod: PBBFAC,,, | Performed by: INTERNAL MEDICINE

## 2022-10-13 PROCEDURE — 99499 RISK ADDL DX/OHS AUDIT: ICD-10-PCS | Mod: S$GLB,,, | Performed by: INTERNAL MEDICINE

## 2022-10-13 PROCEDURE — 3078F PR MOST RECENT DIASTOLIC BLOOD PRESSURE < 80 MM HG: ICD-10-PCS | Mod: CPTII,S$GLB,, | Performed by: INTERNAL MEDICINE

## 2022-10-13 RX ORDER — TRAZODONE HYDROCHLORIDE 50 MG/1
50 TABLET ORAL DAILY
Qty: 90 TABLET | Refills: 3 | Status: SHIPPED | OUTPATIENT
Start: 2022-10-13 | End: 2023-02-02 | Stop reason: SDUPTHER

## 2022-10-13 RX ORDER — BENZONATATE 100 MG/1
100 CAPSULE ORAL 3 TIMES DAILY PRN
Qty: 30 CAPSULE | Refills: 0 | Status: SHIPPED | OUTPATIENT
Start: 2022-10-13 | End: 2022-12-19 | Stop reason: SDUPTHER

## 2022-10-13 RX ORDER — BENZONATATE 100 MG/1
100 CAPSULE ORAL 3 TIMES DAILY PRN
Qty: 90 CAPSULE | Refills: 0 | OUTPATIENT
Start: 2022-10-13 | End: 2022-10-13 | Stop reason: SDUPTHER

## 2022-10-13 RX ORDER — BENZONATATE 100 MG/1
100 CAPSULE ORAL 3 TIMES DAILY PRN
Qty: 90 CAPSULE | Refills: 0 | Status: SHIPPED | OUTPATIENT
Start: 2022-10-13 | End: 2022-10-13

## 2022-10-13 RX ORDER — BENZONATATE 100 MG/1
100 CAPSULE ORAL 3 TIMES DAILY PRN
Qty: 30 CAPSULE | Refills: 0 | Status: SHIPPED | OUTPATIENT
Start: 2022-10-13 | End: 2022-10-13 | Stop reason: SDUPTHER

## 2022-10-13 RX ORDER — HYDROXYZINE HYDROCHLORIDE 25 MG/1
25 TABLET, FILM COATED ORAL 4 TIMES DAILY PRN
Qty: 270 TABLET | Refills: 3 | Status: SHIPPED | OUTPATIENT
Start: 2022-10-13 | End: 2023-01-11 | Stop reason: SDUPTHER

## 2022-10-13 RX ORDER — BENZONATATE 100 MG/1
100 CAPSULE ORAL 3 TIMES DAILY PRN
Qty: 30 CAPSULE | Refills: 3 | Status: SHIPPED | OUTPATIENT
Start: 2022-10-13 | End: 2022-10-23

## 2022-10-13 RX ORDER — HYDROXYZINE HYDROCHLORIDE 25 MG/1
25 TABLET, FILM COATED ORAL 4 TIMES DAILY PRN
Qty: 270 TABLET | Refills: 3 | Status: SHIPPED | OUTPATIENT
Start: 2022-10-13 | End: 2022-10-13 | Stop reason: SDUPTHER

## 2022-10-13 NOTE — PATIENT INSTRUCTIONS
Thank you for your interest in the [facility] Multi Cancer Early Detection test. We are very excited about this clinical trial, the future of early cancer detection, and the overwhelming interest we have received. We have not yet started enrollment for this trial, but are ramping up to do so within the coming month.     Please email [email or web address] for more information and to be added to the list of interested patients.     [facility] is offering this same test for a fee. Please email [email or web address] if you are interested in purchasing.     Thank you for your interest as [facility] strives to improve the health outcomes of our community.

## 2022-10-13 NOTE — PROGRESS NOTES
"    CHIEF COMPLAINT     Chief Complaint   Patient presents with    Annual Exam       HPI     Ni Montejo is a 71 y.o. female w/ hypertension hyperlipidemia anxiety sleep apnea obesity and acid reflux here today for     She is here today with several complaints.  MSK pain bilateral shoulders.  Reports she has itching in her neck hands in between her fingers on the dorsal side.    Also reports episode of gastrointestinal illness that has resolved.'    She has brought a list of supplements that she is taking over-the-counter for various issues.    Personally Reviewed Patient's Medical, surgical, family and social hx. Changes updated in [facility].  Care Team updated in Epic    Review of Systems:  Review of Systems   Gastrointestinal:  Positive for nausea.   Musculoskeletal:  Positive for arthralgias (bilateral shoulder pain).   Skin:         Itching hands     Health Maintenance:   Reviewed with patient  Due for the following:      PHYSICAL EXAM     /72   Pulse 62   Ht 5' 2" (1.575 m)   Wt 130.6 kg (288 lb)   LMP  (LMP Unknown)   SpO2 97%   BMI 52.68 kg/m²     Gen: Well Appearing, NAD obese  HEENT: PERR, EOMI  Neck: FROM, no thyromegaly, no cervical adenopathy  CVD: RRR, no M/R/G  Pulm: Normal work of breathing, CTAB, no wheezing  Abd:  Soft, NT, ND non TTP, no mass  MSK: no LE edema  Neuro: A&Ox3, in wheelchair, speech normal  Mood; Mood normal, behavior normal, thought process linear       LABS     Labs reviewed;   Lab Results   Component Value Date    HGBA1C 5.4 12/08/2021       Chemistry        Component Value Date/Time     10/13/2022 1547    K 4.4 10/13/2022 1547     10/13/2022 1547    CO2 31 (H) 10/13/2022 1547    BUN 14 10/13/2022 1547    CREATININE 1.0 10/13/2022 1547     10/13/2022 1547        Component Value Date/Time    CALCIUM 9.1 10/13/2022 1547    ALKPHOS 87 10/13/2022 1547    AST 23 10/13/2022 1547    ALT 24 10/13/2022 1547    BILITOT 0.4 10/13/2022 1547    ESTGFRAFRICA >60.0 " 12/08/2021 1454    EGFRNONAA >60.0 12/08/2021 1454            ASSESSMENT     1. Essential hypertension, benign  Comprehensive Metabolic Panel      2. Insomnia, unspecified type  traZODone (DESYREL) 50 MG tablet      3. Patient request for diagnostic testing  [facility] Multi-Cancer Screen    CANCELED: [facility] Multi-Cancer Screen      4. Cough, unspecified type  benzonatate (TESSALON) 100 MG capsule    DISCONTINUED: benzonatate (TESSALON) 100 MG capsule    DISCONTINUED: benzonatate (TESSALON) 100 MG capsule    DISCONTINUED: benzonatate (TESSALON) 100 MG capsule      5. Seasonal allergies  hydrOXYzine HCL (ATARAX) 25 MG tablet    DISCONTINUED: hydrOXYzine HCL (ATARAX) 25 MG tablet    DISCONTINUED: hydrOXYzine HCL (ATARAX) 25 MG tablet      6. Class 3 severe obesity due to excess calories without serious comorbidity with body mass index (BMI) of 50.0 to 59.9 in adult                Plan     Ni Montejo is a 71 y.o. female with  hypertension hyperlipidemia anxiety sleep apnea obesity and acid reflux   1. Essential hypertension, benign  Will recheck CMP since she is taking potassium supplement  - Comprehensive Metabolic Panel; Future    2. Insomnia, unspecified type  Refill trazodone  - traZODone (DESYREL) 50 MG tablet; Take 1 tablet (50 mg total) by mouth once daily.  Dispense: 90 tablet; Refill: 3    3. Patient request for diagnostic testing  Patient will see if she wants to pay for this  - [facility] Multi-Cancer Screen; Future    4. Cough, unspecified type  Refilled for episodic cough  - benzonatate (TESSALON) 100 MG capsule; Take 1 capsule (100 mg total) by mouth 3 (three) times daily as needed for Cough.  Dispense: 30 capsule; Refill: 0    5. Seasonal allergies  Refilled  - hydrOXYzine HCL (ATARAX) 25 MG tablet; Take 1 tablet (25 mg total) by mouth 4 (four) times daily as needed for Itching.  Dispense: 270 tablet; Refill: 3    6. Class 3 severe obesity due to excess calories without serious comorbidity with body mass  index (BMI) of 50.0 to 59.9 in adult  Discussed lifestyle interventions for weight loss  Diet: harm reduction strategy: avoid liquid calories, add fruits and veggies to crowd out refined carbs, watch portions, eat at home more often  Activity: activity levelgoal of 150min/week  Sleep: optimize for 8 hours of sleep nightly  Hedonistic eating: avoid eating out of boredom, stress or in front of TV          Giovanni Chi MD

## 2022-10-14 ENCOUNTER — TELEPHONE (OUTPATIENT)
Dept: INTERNAL MEDICINE | Facility: CLINIC | Age: 72
End: 2022-10-14
Payer: MEDICARE

## 2022-10-14 NOTE — TELEPHONE ENCOUNTER
----- Message from Giovanni Chi MD sent at 10/14/2022  9:22 AM CDT -----  Potassium level is okay renal function is normal

## 2022-11-02 ENCOUNTER — TELEPHONE (OUTPATIENT)
Dept: INTERNAL MEDICINE | Facility: CLINIC | Age: 72
End: 2022-11-02
Payer: MEDICARE

## 2022-11-02 NOTE — TELEPHONE ENCOUNTER
Called and spoke to pt. Pt states she started with nasal congestion last week, took covid test and was negative. Pt states she had a small amount of bleeding in nose but that has subsided. Pt inquiring about covid test, answered all of patients questions. Pt has not further needs at this time. Pt denies CP, SPB, fever, body aches HA, and sore throat. Pt will call for appointment if she starts to feel worse.

## 2022-11-02 NOTE — TELEPHONE ENCOUNTER
----- Message from Peggy Bautista sent at 11/2/2022  7:29 AM CDT -----  Contact: Pt Mobile [phone]  Patient would like a call back in regards to her wanting to have a COVID home test please.

## 2022-11-04 DIAGNOSIS — R05.8 UPPER AIRWAY COUGH SYNDROME: ICD-10-CM

## 2022-11-04 RX ORDER — FLUTICASONE PROPIONATE 50 MCG
1 SPRAY, SUSPENSION (ML) NASAL 2 TIMES DAILY
Qty: 16 G | Refills: 11 | Status: SHIPPED | OUTPATIENT
Start: 2022-11-04 | End: 2022-11-08 | Stop reason: SDUPTHER

## 2022-11-04 NOTE — TELEPHONE ENCOUNTER
No new care gaps identified.  [facility] Embedded Care Gaps. Reference number: [id number]. 11/04/2022   11:10:27 AM RICKYT

## 2022-11-04 NOTE — TELEPHONE ENCOUNTER
----- Message from Jelena Grimm sent at 11/4/2022 10:56 AM CDT -----  Contact: [phone]  Requesting an RX refill or new RX.  Is this a refill or new RX: refill  RX name and strength   fluticasone propionate (FLONASE) 50 mcg/actuation nasal spray 16 g   Is this a 30 day or 90 day RX:   Pharmacy name and phone #   [facility][id number] - [place] [address] AT [facility] [address]   Phone:  [phone]  Fax:  [phone]    The doctors have asked that we provide their patients with the following 2 reminders -- prescription refills can take up to 72 hours, and a friendly reminder that in the future you can use your [facility] account to request refills: yes

## 2022-11-07 DIAGNOSIS — R05.8 UPPER AIRWAY COUGH SYNDROME: ICD-10-CM

## 2022-11-07 RX ORDER — FLUTICASONE PROPIONATE 50 MCG
1 SPRAY, SUSPENSION (ML) NASAL 2 TIMES DAILY
Qty: 16 G | Refills: 11 | Status: CANCELLED | OUTPATIENT
Start: 2022-11-07

## 2022-11-07 NOTE — TELEPHONE ENCOUNTER
----- Message from Bonita Servin sent at 11/7/2022  4:30 PM CST -----  Contact: [phone]  Pt is calling she states she put in for this last week and it has not been filled please give her a return call she is asking for Anali       Requesting an RX refill or new RX.  Is this a refill or new RX: refill  RX name and strength (copy/paste from chart):  fluticasone propionate (FLONASE) 50 mcg/actuation nasal spray  Is this a 30 day or 90 day RX: 30  Pharmacy name and phone # (copy/paste from chart):    [id number] [address] AT [facility] [address]  [address]  [facility] [address]  Phone: [phone] Fax: [phone]       The doctors have asked that we provide their patients with the following 2 reminders -- prescription refills can take up to 72 hours, and a friendly reminder that in the future you can use your [facility] account to request refills: yes

## 2022-11-07 NOTE — TELEPHONE ENCOUNTER
No new care gaps identified.  [facility] Embedded Care Gaps. Reference number: [id number]. 11/07/2022   5:04:04 PM CST

## 2022-11-08 DIAGNOSIS — R05.8 UPPER AIRWAY COUGH SYNDROME: ICD-10-CM

## 2022-11-08 RX ORDER — FLUTICASONE PROPIONATE 50 MCG
1 SPRAY, SUSPENSION (ML) NASAL 2 TIMES DAILY
Qty: 16 G | Refills: 11 | Status: SHIPPED | OUTPATIENT
Start: 2022-11-08 | End: 2023-07-11 | Stop reason: SDUPTHER

## 2022-11-08 RX ORDER — FLUTICASONE PROPIONATE 50 MCG
1 SPRAY, SUSPENSION (ML) NASAL 2 TIMES DAILY
Qty: 16 G | Refills: 11 | OUTPATIENT
Start: 2022-11-08 | End: 2022-11-08 | Stop reason: SDUPTHER

## 2022-11-08 NOTE — TELEPHONE ENCOUNTER
----- Message from Ana Paula Hung sent at 11/8/2022  7:54 AM CST -----  Contact: [phone]  Patient called, requested a courtesy call from Dr Chi nurse in regards medication that she asked last week has not been sent yet. Please call and advise. Thank you

## 2022-11-08 NOTE — TELEPHONE ENCOUNTER
No new care gaps identified.  [facility] Embedded Care Gaps. Reference number: [id number]. 11/08/2022   11:18:24 AM CST

## 2022-11-10 ENCOUNTER — PES CALL (OUTPATIENT)
Dept: ADMINISTRATIVE | Facility: CLINIC | Age: 72
End: 2022-11-10
Payer: MEDICARE

## 2022-12-19 DIAGNOSIS — R05.9 COUGH, UNSPECIFIED TYPE: ICD-10-CM

## 2022-12-19 RX ORDER — BENZONATATE 100 MG/1
100 CAPSULE ORAL 3 TIMES DAILY PRN
Qty: 30 CAPSULE | Refills: 0 | Status: SHIPPED | OUTPATIENT
Start: 2022-12-19 | End: 2022-12-20 | Stop reason: SDUPTHER

## 2022-12-19 NOTE — TELEPHONE ENCOUNTER
Refill Routing Note   Medication(s) are not appropriate for processing by [facility] for the following reason(s):      - Outside of protocol    [facility] action(s):  Route          Medication reconciliation completed: No     Appointments  past 12m or future 3m with PCP    Date Provider   Last Visit   10/13/2022 Giovanni Chi MD   Next Visit   Visit date not found Giovanni Chi MD   ED visits in past 90 days: 0        Note composed:9:30 AM 12/19/2022

## 2022-12-19 NOTE — TELEPHONE ENCOUNTER
----- Message from Ana Paula Hung sent at 12/19/2022  8:47 AM CST -----  Contact: [phone] (M)  Requesting an RX refill or new RX.  Is this a refill or new RX: Refill 1  RX name and strength (copy/paste from chart):  benzonatate (TESSALON) 100 MG capsule   Is this a 30 day or 90 day RX:   Pharmacy name and phone # (copy/paste from chart):  [facility] [address] [place] [address] [address] [place] [address]   Phone:  [phone]  Fax:  [phone]        The doctors have asked that we provide their patients with the following 2 reminders -- prescription refills can take up to 72 hours, and a friendly reminder that in the future you can use your [facility] account to request refills:

## 2022-12-20 DIAGNOSIS — R05.9 COUGH, UNSPECIFIED TYPE: ICD-10-CM

## 2022-12-20 RX ORDER — BENZONATATE 100 MG/1
100 CAPSULE ORAL 3 TIMES DAILY PRN
Qty: 30 CAPSULE | Refills: 0 | Status: SHIPPED | OUTPATIENT
Start: 2022-12-20 | End: 2023-03-20

## 2022-12-20 NOTE — TELEPHONE ENCOUNTER
----- Message from Dedra Coe sent at 12/20/2022 11:51 AM CST -----  Contact: [phone]  Looks like script for benzonatate (TESSALON) 100 MG capsule was printed. She is requesting it be sent to [facility] b/c it is free for her to get there. Can you please send?        [facility] [address] [place] [address], [place] [address]  [address]  [place] [address]  Phone: [phone] Fax: [phone]

## 2022-12-27 ENCOUNTER — TELEPHONE (OUTPATIENT)
Dept: INTERNAL MEDICINE | Facility: CLINIC | Age: 72
End: 2022-12-27
Payer: MEDICARE

## 2022-12-27 NOTE — TELEPHONE ENCOUNTER
Tested positive for covid today .    Stated her symptoms stated 4 days ago but she thought it was bronchitis.    Declined chest pains, difficulty breathing, fever.    Current symptoms are SOB but nothing out of her usual, cough, thick yellow phlegm.     She is inquiring about getting a rx for paxlovid and a refill on her benzonatate.    Also advised her of the insolation guideline and provided on call nursing number if symptoms become severe.   Please advise

## 2022-12-27 NOTE — TELEPHONE ENCOUNTER
----- Message from Elaine Ge MA sent at 12/27/2022  1:56 PM CST -----  Contact: [phone]    ----- Message -----  From: Stella Pacheco  Sent: 12/27/2022   1:53 PM CST  To: [facility] Staff    Pt called to advise that she tested positive for C-19. She would like to speak to the provider or nurse in regards to treatment options. Pt says symptoms started about 4 days ago. Pt says she didn't test until now because she thought it was bronchitis. Please Advise         [id number] - [place], [place] - [address] AT [facility] [address]  [facility] [place] [place]  [facility] [address]  Phone: [phone] Fax: [phone]    [address] [place] [address]  [facility] [place] [address]  Phone: [phone] Fax: [phone]

## 2022-12-28 ENCOUNTER — TELEPHONE (OUTPATIENT)
Dept: INTERNAL MEDICINE | Facility: CLINIC | Age: 72
End: 2022-12-28
Payer: MEDICARE

## 2022-12-28 NOTE — TELEPHONE ENCOUNTER
----- Message from Bonita Servin sent at 12/28/2022  8:30 AM CST -----  Contact: [phone]  Pt is calling she states she called yesterday in regards to having covid and is asking if the medication has been sent in for her please give return call

## 2022-12-29 ENCOUNTER — TELEPHONE (OUTPATIENT)
Dept: INTERNAL MEDICINE | Facility: CLINIC | Age: 72
End: 2022-12-29
Payer: MEDICARE

## 2022-12-29 NOTE — TELEPHONE ENCOUNTER
----- Message from Renata Espino sent at 12/29/2022 12:24 PM CST -----  Patient is returning a call from this office. Stated that she spoke to the pharmacy and they advised her to stop the rosuvastatin for 1 week while taking paxlovid. She has stopped.    Thank you

## 2023-01-11 ENCOUNTER — TELEPHONE (OUTPATIENT)
Dept: INTERNAL MEDICINE | Facility: CLINIC | Age: 73
End: 2023-01-11
Payer: MEDICARE

## 2023-01-11 DIAGNOSIS — J30.2 SEASONAL ALLERGIES: ICD-10-CM

## 2023-01-11 DIAGNOSIS — I10 ESSENTIAL HYPERTENSION, BENIGN: ICD-10-CM

## 2023-01-11 RX ORDER — LOSARTAN POTASSIUM AND HYDROCHLOROTHIAZIDE 12.5; 5 MG/1; MG/1
1 TABLET ORAL DAILY
Qty: 90 TABLET | Refills: 3 | Status: SHIPPED | OUTPATIENT
Start: 2023-01-11 | End: 2023-07-11 | Stop reason: SDUPTHER

## 2023-01-11 NOTE — TELEPHONE ENCOUNTER
----- Message from Ana Paula Hung sent at 1/11/2023  2:20 PM CST -----  Contact: [phone]  Requesting an RX refill or new RX.  Is this a refill or new RX: Refill 1  RX name and strength (copy/paste from chart):  hydrOXYzine HCL (ATARAX) 25 MG tablet  Is this a 30 day or 90 day RX:   Pharmacy name and phone # (copy/paste from chart): [facility] [address] [place] [address]  Phone:  [phone]  Fax:  [phone]          The doctors have asked that we provide their patients with the following 2 reminders -- prescription refills can take up to 72 hours, and a friendly reminder that in the future you can use your [facility] account to request refills:

## 2023-01-11 NOTE — TELEPHONE ENCOUNTER
Provider Staff- Action is required     If a refill request needs assessment, Please pend appropriate medication(s) for patient and route the refill request to the Centralized Refill Staff Pool for assessment.     If medication is already pended in a previous encounter, please add any call/ encounter notes to that previous encounter and route that encounter to the [facility] staff pool High Priority.     If medication is not pended as a Refill Request the data required for the Refill Center to assess will not generate and the medications will not be able to be assessed properly by the Refill Center.     Thank you for your assistance!   [facility]     Note composed:2:33 PM 01/11/2023

## 2023-01-11 NOTE — TELEPHONE ENCOUNTER
Care Due:                  Date            Visit Type   Department     Provider  --------------------------------------------------------------------------------                                EP -                              PRIMARY      [facility]  Last Visit: 10-      CARE ([facility])   MEDICINE       [facility]AG[facility] ABDALLA  Next Visit: None Scheduled  None         None Found                                                            Last  Test          Frequency    Reason                     Performed    Due Date  --------------------------------------------------------------------------------    Lipid Panel.  12 months..  rosuvastatin.............  02- 02-    [facility] Embedded Care Gaps. Reference number: [id number]. 1/11/2023   2:29:27 PM CST

## 2023-01-11 NOTE — TELEPHONE ENCOUNTER
Refill Decision Note   Ni Montejo  is requesting a refill authorization.  Brief Assessment and Rationale for Refill:  Approve    -Medication-Related Problems Identified: Requires labs  Medication Therapy Plan:  LIPID PANEL    Medication Reconciliation Completed: No   Comments:     Provider Staff:     Action is required for this patient.   Please see care gap opportunities below in Care Due Message.     Thanks!  [facility]     Appointments      Date Provider   Last Visit   10/13/2022 Giovanni Chi MD   Next Visit   1/11/2023 Giovanni Chi MD     Note composed:2:41 PM 01/11/2023           Note composed:2:41 PM 01/11/2023

## 2023-01-11 NOTE — TELEPHONE ENCOUNTER
Refill Routing Note   Medication(s) are not appropriate for processing by [facility] for the following reason(s):      - Outside of protocol    [facility] action(s):  Route          Medication reconciliation completed: No     Appointments  past 12m or future 3m with PCP    Date Provider   Last Visit   10/13/2022 Giovanni Chi MD   Next Visit   Visit date not found Giovanni Chi MD   ED visits in past 90 days: 0        Note composed:2:47 PM 01/11/2023

## 2023-01-11 NOTE — TELEPHONE ENCOUNTER
----- Message from Ana Paula Hung sent at 1/11/2023  2:19 PM CST -----  Requesting an RX refill or new RX.  Is this a refill or new RX: Refill 1  RX name and strength (copy/paste from chart):  losartan-hydrochlorothiazide 50-12.5 mg (HYZAAR) 50-12.5 mg per tablet  Is this a 30 day or 90 day RX:   Pharmacy name and phone # (copy/paste from chart):  [id number] [address] AT [facility] [address]   Phone:  [phone]  Fax:  [phone]          The doctors have asked that we provide their patients with the following 2 reminders -- prescription refills can take up to 72 hours, and a friendly reminder that in the future you can use your [facility] account to request refills:

## 2023-01-11 NOTE — TELEPHONE ENCOUNTER
----- Message from Ana Paula Hung sent at 1/11/2023  2:20 PM CST -----  Contact: [phone]  Requesting an RX refill or new RX.  Is this a refill or new RX: Refill 1  RX name and strength (copy/paste from chart):  hydrOXYzine HCL (ATARAX) 25 MG tablet  Is this a 30 day or 90 day RX:   Pharmacy name and phone # (copy/paste from chart): [facility] [address] [place] [address]  Phone:  [phone]  Fax:  [phone]          The doctors have asked that we provide their patients with the following 2 reminders -- prescription refills can take up to 72 hours, and a friendly reminder that in the future you can use your [facility] account to request refills:

## 2023-01-12 RX ORDER — HYDROXYZINE HYDROCHLORIDE 25 MG/1
25 TABLET, FILM COATED ORAL 4 TIMES DAILY PRN
Qty: 270 TABLET | Refills: 3 | Status: SHIPPED | OUTPATIENT
Start: 2023-01-12 | End: 2024-01-22 | Stop reason: SDUPTHER

## 2023-02-02 DIAGNOSIS — G47.00 INSOMNIA, UNSPECIFIED TYPE: ICD-10-CM

## 2023-02-02 RX ORDER — TRAZODONE HYDROCHLORIDE 50 MG/1
50 TABLET ORAL DAILY
Qty: 90 TABLET | Refills: 1 | Status: SHIPPED | OUTPATIENT
Start: 2023-02-02

## 2023-02-02 NOTE — TELEPHONE ENCOUNTER
No new care gaps identified.  [facility] Embedded Care Gaps. Reference number: [id number]. 2/02/2023   4:20:29 PM CST

## 2023-02-03 RX ORDER — ROSUVASTATIN CALCIUM 20 MG/1
20 TABLET, COATED ORAL NIGHTLY
Qty: 90 TABLET | Refills: 3 | Status: SHIPPED | OUTPATIENT
Start: 2023-02-03 | End: 2024-01-22 | Stop reason: SDUPTHER

## 2023-02-03 NOTE — TELEPHONE ENCOUNTER
Refill Routing Note   Medication(s) are not appropriate for processing by [facility] for the following reason(s):       Requires lab    [facility] action(s):  Defer  Approve      Alert overridden per protocol: Yes            Medication Therapy Plan: defer crestor- labs. approve trazodone- sent rx to pharmacy in rx request message      Appointments  past 12m or future 3m with PCP    Date Provider   Last Visit   10/13/2022 Giovanni Chi MD   Next Visit   Visit date not found Giovanni Chi MD   ED visits in past 90 days: 0        Note composed:7:09 PM 02/02/2023

## 2023-02-15 ENCOUNTER — TELEPHONE (OUTPATIENT)
Dept: SLEEP MEDICINE | Facility: CLINIC | Age: 73
End: 2023-02-15
Payer: MEDICARE

## 2023-02-15 NOTE — TELEPHONE ENCOUNTER
----- Message from Catia Castaneda sent at 2/14/2023 11:19 AM CST -----  Type: Patient Call Back    Who called:self    What is the request in detail:pt switched insurance and needs authorization sent to [facility] for medical necessity for supplies. Please call    Can the clinic reply by [facility]?    Would the patient rather a call back or a response via [facility]? call    Best call back number:[phone] (home)

## 2023-02-16 ENCOUNTER — TELEPHONE (OUTPATIENT)
Dept: SLEEP MEDICINE | Facility: CLINIC | Age: 73
End: 2023-02-16
Payer: MEDICARE

## 2023-02-16 DIAGNOSIS — G47.33 OSA (OBSTRUCTIVE SLEEP APNEA): Primary | ICD-10-CM

## 2023-02-16 NOTE — TELEPHONE ENCOUNTER
----- Message from Natasha Benton sent at 2/16/2023  3:33 PM CST -----  Regarding: [id number]  Type: Patient Call Back    Who called:self    What is the request in detail: Pt states she will like a carley back from he nurse she last spoke with. She states she was calling to apologize.    Can the clinic reply by [facility]?no    Would the patient rather a call back or a response via [facility]? Call back    Best call back number:[phone]      Additional Information:

## 2023-02-23 ENCOUNTER — TELEPHONE (OUTPATIENT)
Dept: SLEEP MEDICINE | Facility: CLINIC | Age: 73
End: 2023-02-23
Payer: MEDICARE

## 2023-02-23 NOTE — TELEPHONE ENCOUNTER
----- Message from Teresa Arrieta sent at 2/23/2023 11:36 AM CST -----  Regarding: pt  [phone]  Type: Patient Call Back    Who called:pt     What is the request in detail:pt is requesting a authorization form to be sent to her insurance  for her cpap machine    Can the clinic reply by [facility]?no     Would the patient rather a call back or a response via [facility]?call back     Best call back number: [phone]      Additional Information: [employer] phone: [phone]

## 2023-04-12 DIAGNOSIS — K21.9 GASTROESOPHAGEAL REFLUX DISEASE WITHOUT ESOPHAGITIS: ICD-10-CM

## 2023-04-12 RX ORDER — OMEPRAZOLE 40 MG/1
40 CAPSULE, DELAYED RELEASE ORAL DAILY
Qty: 90 CAPSULE | Refills: 1 | Status: SHIPPED | OUTPATIENT
Start: 2023-04-12 | End: 2023-09-07

## 2023-04-12 NOTE — TELEPHONE ENCOUNTER
----- Message from Peggy Bautista sent at 4/12/2023  2:56 PM CDT -----  Contact: Pt Mobile [phone]  Patient would like to know why her order for Omeprazole that was sent to [facility] was denied?

## 2023-04-12 NOTE — TELEPHONE ENCOUNTER
No new care gaps identified.  [facility] Embedded Care Gaps. Reference number: [id number]. 4/12/2023   2:59:29 PM CDT

## 2023-04-12 NOTE — TELEPHONE ENCOUNTER
Refill Decision Note   Niabby Obrienkimo  is requesting a refill authorization.  Brief Assessment and Rationale for Refill:  Approve     Medication Therapy Plan:         Comments:     No Care Gaps recommended.     Note composed:3:54 PM 04/12/2023

## 2023-05-13 ENCOUNTER — HOSPITAL ENCOUNTER (EMERGENCY)
Facility: HOSPITAL | Age: 73
Discharge: HOME | End: 2023-05-13
Attending: EMERGENCY MEDICINE
Payer: MEDICARE

## 2023-05-13 VITALS
SYSTOLIC BLOOD PRESSURE: 158 MMHG | DIASTOLIC BLOOD PRESSURE: 73 MMHG | TEMPERATURE: 98 F | OXYGEN SATURATION: 98 % | WEIGHT: 288 LBS | HEART RATE: 58 BPM | RESPIRATION RATE: 15 BRPM | BODY MASS INDEX: 52.68 KG/M2

## 2023-05-13 DIAGNOSIS — R07.9 CHEST PAIN: ICD-10-CM

## 2023-05-13 DIAGNOSIS — M25.521 RIGHT ELBOW PAIN: Primary | ICD-10-CM

## 2023-05-13 LAB
ALBUMIN SERPL-MCNC: 3.4 G/DL (ref 3.3–5.5)
ALP SERPL-CCNC: 94 U/L (ref 42–141)
BILIRUB SERPL-MCNC: 0.6 MG/DL (ref 0.2–1.6)
BILIRUBIN, POC UA: NEGATIVE
BLOOD, POC UA: ABNORMAL
BUN SERPL-MCNC: 9 MG/DL (ref 7–22)
CALCIUM SERPL-MCNC: 9.2 MG/DL (ref 8–10.3)
CHLORIDE SERPL-SCNC: 102 MMOL/L (ref 98–108)
CLARITY, POC UA: ABNORMAL
COLOR, POC UA: ABNORMAL
CREAT SERPL-MCNC: 1 MG/DL (ref 0.6–1.2)
GLUCOSE SERPL-MCNC: 128 MG/DL (ref 73–118)
GLUCOSE, POC UA: NEGATIVE
KETONES, POC UA: NEGATIVE
LEUKOCYTE EST, POC UA: NEGATIVE
NITRITE, POC UA: NEGATIVE
PH UR STRIP: 6.5 [PH]
POC ALT (SGPT): 14 U/L (ref 10–47)
POC AST (SGOT): 28 U/L (ref 11–38)
POC B-TYPE NATRIURETIC PEPTIDE: 24 PG/ML (ref 0–100)
POC CARDIAC TROPONIN I: 0 NG/ML (ref 0–0.08)
POC PTINR: 1.1 (ref 0.9–1.2)
POC PTWBT: 13.5 SEC (ref 9.7–14.3)
POC TCO2: 32 MMOL/L (ref 18–33)
POTASSIUM BLD-SCNC: 3.8 MMOL/L (ref 3.6–5.1)
PROTEIN, POC UA: ABNORMAL
PROTEIN, POC: 7.5 G/DL (ref 6.4–8.1)
SAMPLE: NORMAL
SAMPLE: NORMAL
SODIUM BLD-SCNC: 145 MMOL/L (ref 128–145)
SPECIFIC GRAVITY, POC UA: >=1.03
URATE SERPL-MCNC: 4.9 MG/DL (ref 2.4–5.7)
UROBILINOGEN, POC UA: 0.2 E.U./DL

## 2023-05-13 PROCEDURE — 84550 ASSAY OF BLOOD/URIC ACID: CPT | Performed by: EMERGENCY MEDICINE

## 2023-05-13 PROCEDURE — 25000242 PHARM REV CODE 250 ALT 637 W/ HCPCS: Mod: ER | Performed by: EMERGENCY MEDICINE

## 2023-05-13 PROCEDURE — 85610 PROTHROMBIN TIME: CPT | Mod: ER

## 2023-05-13 PROCEDURE — 80053 COMPREHEN METABOLIC PANEL: CPT | Mod: ER

## 2023-05-13 PROCEDURE — 93010 EKG 12-LEAD: ICD-10-PCS | Mod: ,,, | Performed by: INTERNAL MEDICINE

## 2023-05-13 PROCEDURE — 85025 COMPLETE CBC W/AUTO DIFF WBC: CPT | Mod: ER

## 2023-05-13 PROCEDURE — 25000003 PHARM REV CODE 250: Mod: ER | Performed by: EMERGENCY MEDICINE

## 2023-05-13 PROCEDURE — 84484 ASSAY OF TROPONIN QUANT: CPT | Mod: ER

## 2023-05-13 PROCEDURE — 96374 THER/PROPH/DIAG INJ IV PUSH: CPT | Mod: ER

## 2023-05-13 PROCEDURE — 63600175 PHARM REV CODE 636 W HCPCS: Mod: ER | Performed by: EMERGENCY MEDICINE

## 2023-05-13 PROCEDURE — 99285 EMERGENCY DEPT VISIT HI MDM: CPT | Mod: 25,ER

## 2023-05-13 PROCEDURE — 83880 ASSAY OF NATRIURETIC PEPTIDE: CPT | Mod: ER

## 2023-05-13 PROCEDURE — 81003 URINALYSIS AUTO W/O SCOPE: CPT | Mod: ER

## 2023-05-13 PROCEDURE — 93010 ELECTROCARDIOGRAM REPORT: CPT | Mod: ,,, | Performed by: INTERNAL MEDICINE

## 2023-05-13 PROCEDURE — 93005 ELECTROCARDIOGRAM TRACING: CPT | Mod: ER

## 2023-05-13 RX ORDER — IBUPROFEN 600 MG/1
600 TABLET ORAL EVERY 6 HOURS PRN
Qty: 20 TABLET | Refills: 0 | Status: SHIPPED | OUTPATIENT
Start: 2023-05-13

## 2023-05-13 RX ORDER — ACETAMINOPHEN 500 MG
500 TABLET ORAL EVERY 6 HOURS PRN
Qty: 30 TABLET | Refills: 0 | Status: SHIPPED | OUTPATIENT
Start: 2023-05-13

## 2023-05-13 RX ORDER — NITROGLYCERIN 0.4 MG/1
0.4 TABLET SUBLINGUAL EVERY 5 MIN PRN
Status: DISCONTINUED | OUTPATIENT
Start: 2023-05-13 | End: 2023-05-13 | Stop reason: HOSPADM

## 2023-05-13 RX ORDER — DICLOFENAC SODIUM 10 MG/G
2 GEL TOPICAL 4 TIMES DAILY PRN
Qty: 200 G | Refills: 0 | Status: SHIPPED | OUTPATIENT
Start: 2023-05-13

## 2023-05-13 RX ORDER — KETOROLAC TROMETHAMINE 30 MG/ML
15 INJECTION, SOLUTION INTRAMUSCULAR; INTRAVENOUS
Status: COMPLETED | OUTPATIENT
Start: 2023-05-13 | End: 2023-05-13

## 2023-05-13 RX ORDER — METHOCARBAMOL 500 MG/1
1000 TABLET, FILM COATED ORAL 3 TIMES DAILY
Qty: 30 TABLET | Refills: 0 | Status: SHIPPED | OUTPATIENT
Start: 2023-05-13 | End: 2023-05-18

## 2023-05-13 RX ORDER — ASPIRIN 325 MG
325 TABLET ORAL
Status: COMPLETED | OUTPATIENT
Start: 2023-05-13 | End: 2023-05-13

## 2023-05-13 RX ADMIN — KETOROLAC TROMETHAMINE 15 MG: 30 INJECTION, SOLUTION INTRAMUSCULAR; INTRAVENOUS at 10:05

## 2023-05-13 RX ADMIN — ASPIRIN 325 MG ORAL TABLET 325 MG: 325 PILL ORAL at 10:05

## 2023-05-13 NOTE — DISCHARGE INSTRUCTIONS
Your uric acid results are pending.  They will result on the [facility] my chart.  [facility] or you can follow-up with your primary care for results.
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
## 2023-05-22 ENCOUNTER — TELEPHONE (OUTPATIENT)
Dept: INTERNAL MEDICINE | Facility: CLINIC | Age: 73
End: 2023-05-22
Payer: MEDICARE

## 2023-05-22 NOTE — TELEPHONE ENCOUNTER
----- Message from Giovanni Chi MD sent at 5/22/2023 11:40 AM CDT -----  Regarding: RE: patient is wanting to speak with you said they had audio only  Contact: Pt Mobile [phone]  I am not doing audio visits so I'm not sure how it got scheduled. Will have her rescheduled.      Giovanni Chi    ----- Message -----  From: Della Ramirez MA  Sent: 5/22/2023  10:42 AM CDT  To: Giovanni Chi MD  Subject: patient is wanting to speak with you said th#    Hey I was not sure if you know this was an audio only appt. Please advise-Della     ----- Message -----  From: Peggy Bautista  Sent: 5/22/2023   8:05 AM CDT  To: [facility] Staff    Patient is calling in regards to her saying that she have not gotten a call on this morning, patient had an Audio Visit that was scheduled for seven thirty on this morning with you and she would like for you to give her a call please.

## 2023-07-11 DIAGNOSIS — I10 ESSENTIAL HYPERTENSION, BENIGN: ICD-10-CM

## 2023-07-11 DIAGNOSIS — R05.8 UPPER AIRWAY COUGH SYNDROME: ICD-10-CM

## 2023-07-11 RX ORDER — LOSARTAN POTASSIUM AND HYDROCHLOROTHIAZIDE 12.5; 5 MG/1; MG/1
1 TABLET ORAL DAILY
Qty: 90 TABLET | Refills: 3 | Status: SHIPPED | OUTPATIENT
Start: 2023-07-11 | End: 2023-07-11 | Stop reason: SDUPTHER

## 2023-07-11 RX ORDER — FLUTICASONE PROPIONATE 50 MCG
1 SPRAY, SUSPENSION (ML) NASAL 2 TIMES DAILY
Qty: 16 G | Refills: 11 | Status: SHIPPED | OUTPATIENT
Start: 2023-07-11

## 2023-07-11 RX ORDER — FLUTICASONE PROPIONATE 50 MCG
1 SPRAY, SUSPENSION (ML) NASAL 2 TIMES DAILY
Qty: 16 G | Refills: 11 | Status: SHIPPED | OUTPATIENT
Start: 2023-07-11 | End: 2023-07-11 | Stop reason: SDUPTHER

## 2023-07-11 RX ORDER — LOSARTAN POTASSIUM AND HYDROCHLOROTHIAZIDE 12.5; 5 MG/1; MG/1
1 TABLET ORAL DAILY
Qty: 90 TABLET | Refills: 3 | Status: SHIPPED | OUTPATIENT
Start: 2023-07-11 | End: 2024-01-05 | Stop reason: SDUPTHER

## 2023-08-22 ENCOUNTER — TELEPHONE (OUTPATIENT)
Dept: INTERNAL MEDICINE | Facility: CLINIC | Age: 73
End: 2023-08-22
Payer: MEDICARE

## 2023-08-22 DIAGNOSIS — Z12.31 VISIT FOR SCREENING MAMMOGRAM: Primary | ICD-10-CM

## 2023-08-22 NOTE — TELEPHONE ENCOUNTER
----- Message from Shelbie Aguilar sent at 8/22/2023  9:47 AM CDT -----  Contact: [phone] Patient  Caller is requesting an earlier appointment then we can schedule.  Caller is requesting a message be sent to the provider.  If this is for urgent care symptoms, did you offer other providers at this location, providers at other locations, or [facility]? (yes, no, n/a):  no  If this is for the patients physical, did you offer to schedule next available and put on wait list, or to see NP or PA for their physical?  (yes, no, n/a):  yes  When is the next available appointment with their provider:  10/30/23  Reason for the appointment:  pre op clearance for cataract surgery on 10/05/2023  Patient preference of timeframe to be scheduled:  end of August or beginning of Sept.   Would the patient like a call back, or a response through their [facility] portal?:   call back  Comments:   Pt also states she would like to have a chest and lung xray & ECHO stress test.

## 2023-08-22 NOTE — TELEPHONE ENCOUNTER
Attempted to contact pt to schedule appt's for Pre-Op for Cataract Surgery Procedure date 10-05/2023  Annual exam appt need to be scheduled on or after October 13, 2023 since last annual exam was on October 13, 2022. The two visits can not be done as 1 appt as a Pre-Op exam and an Annual exam need to be 2 separate visits.

## 2023-08-22 NOTE — TELEPHONE ENCOUNTER
----- Message from Shelbie Aguilar sent at 8/22/2023  9:50 AM CDT -----  Contact: [phone] Patient  Caller is requesting to schedule their annual screening mammogram appointment. Order is not listed in Epic.  Please enter order and contact patient to schedule.  Would the patient like a call back, or a response through their [facility] portal?:   call back

## 2023-09-01 DIAGNOSIS — G47.33 OSA (OBSTRUCTIVE SLEEP APNEA): Primary | ICD-10-CM

## 2023-09-15 ENCOUNTER — TELEPHONE (OUTPATIENT)
Dept: INTERNAL MEDICINE | Facility: CLINIC | Age: 73
End: 2023-09-15
Payer: MEDICARE

## 2023-09-15 NOTE — TELEPHONE ENCOUNTER
----- Message from Alexa Preciado sent at 9/15/2023  3:55 PM CDT -----  Contact: [phone]  [email or web address]     Patient is calling for Medical Advice regarding: Pre op clearance for cataract surgery , pt states her surgery is 10/05/2023 . Please call and advise. Thanks     Would like response via [employer]:  call back     Comments:

## 2023-09-15 NOTE — TELEPHONE ENCOUNTER
Patient states she was told by pcp she doesn't need an appt for clearance. Patient states her clearance was form was dropped off on Monday 9-11

## 2023-09-19 ENCOUNTER — TELEPHONE (OUTPATIENT)
Dept: INTERNAL MEDICINE | Facility: CLINIC | Age: 73
End: 2023-09-19
Payer: MEDICARE

## 2023-09-19 NOTE — TELEPHONE ENCOUNTER
----- Message from Holly Grimm sent at 9/19/2023  9:53 AM CDT -----  Contact: [phone]  Pt is checking on the status of her paperwork for her Ophthalmologist. Per pt, she dropped off the paperwork on 09/11. Pt would like to know if it has been sent over or can she pick it up. Please call with an update.            Thank you

## 2023-09-19 NOTE — TELEPHONE ENCOUNTER
----- Message from Holly Grimm sent at 9/19/2023  9:53 AM CDT -----  Contact: [phone]  Pt is checking on the status of her paperwork for her Ophthalmologist. Per pt, she dropped off the paperwork on 09/11. Pt would like to know if it has been sent over or can she pick it up. Please call with an update.            Thank you

## 2023-09-19 NOTE — TELEPHONE ENCOUNTER
----- Message from Alexa Preciado sent at 9/19/2023  1:24 PM CDT -----  Contact: [phone]  Patient is returning a phone call.  Who left a message for the patient: Linda Ge MA  Does patient know what this is regarding:    Would you like a call back, or a response through your [facility] portal?:   call back   Comments:

## 2023-09-20 DIAGNOSIS — Z78.0 MENOPAUSE: ICD-10-CM

## 2023-12-01 ENCOUNTER — TELEPHONE (OUTPATIENT)
Dept: INTERNAL MEDICINE | Facility: CLINIC | Age: 73
End: 2023-12-01
Payer: MEDICARE

## 2023-12-01 VITALS — SYSTOLIC BLOOD PRESSURE: 134 MMHG | DIASTOLIC BLOOD PRESSURE: 73 MMHG

## 2024-01-05 DIAGNOSIS — I10 ESSENTIAL HYPERTENSION, BENIGN: ICD-10-CM

## 2024-01-05 NOTE — TELEPHONE ENCOUNTER
----- Message from Jessica Taoerson sent at 1/5/2024  9:43 AM CST -----  Contact: LIMA [phone]--  Requesting an RX refill or new RX.    Is this a refill or new RX: --Refill--    RX name and strength (copy/paste from chart):    1.losartan-hydrochlorothiazide 50-12.5 mg (HYZAAR) 50-12.5 mg per tablet    Is this a 30 day or 90 day RX: --30--days with refills --    Pharmacy name and phone # (copy/paste from chart):  [facility] [place] - [address]  [address] [address]  Phone: [phone] Fax: [phone]     Comment: PT states that she requested the medication listed above 2-days ago and the pharmacy still has not received it . She would like it to be sent today, because she only has 3 pills left. Please call to advise.

## 2024-01-05 NOTE — TELEPHONE ENCOUNTER
Refill Routing Note   Medication(s) are not appropriate for processing by [facility] for the following reason(s):        Required labs outdated  ED/Hospital Visit since last OV with provider    [facility] action(s):  Defer   Requires labs : Yes     Requires appointment : Yes             Appointments  past 12m or future 3m with PCP    Date Provider   Last Visit   10/13/2022 Giovanni Chi MD   Next Visit   Visit date not found Giovanni Chi MD   ED visits in past 90 days: 0        Note composed:5:37 PM 01/05/2024

## 2024-01-05 NOTE — TELEPHONE ENCOUNTER
Care Due:                  Date            Visit Type   Department     Provider  --------------------------------------------------------------------------------                                EP -                              PRIMARY      [facility]  Last Visit: 10-      CARE ([facility])   MEDICINE       DAGMAR ABDALLA  Next Visit: None Scheduled  None         None Found                                                            Last  Test          Frequency    Reason                     Performed    Due Date  --------------------------------------------------------------------------------    Office Visit  15 months..  albuterol,                 10-   01-                             losartan-hydrochlorothiaz                             myah, omeprazole,                             rosuvastatin, traZODone..    Lipid Panel.  12 months..  rosuvastatin.............  Not Found    Overdue    [employer] Embedded Care Due Messages. Reference number: [id number].   1/05/2024 3:24:19 PM CST

## 2024-01-08 RX ORDER — LOSARTAN POTASSIUM AND HYDROCHLOROTHIAZIDE 12.5; 5 MG/1; MG/1
1 TABLET ORAL DAILY
Qty: 90 TABLET | Refills: 3 | Status: SHIPPED | OUTPATIENT
Start: 2024-01-08

## 2024-01-22 DIAGNOSIS — J30.2 SEASONAL ALLERGIES: ICD-10-CM

## 2024-01-22 DIAGNOSIS — K21.9 GASTROESOPHAGEAL REFLUX DISEASE WITHOUT ESOPHAGITIS: ICD-10-CM

## 2024-01-22 DIAGNOSIS — R05.8 UPPER AIRWAY COUGH SYNDROME: ICD-10-CM

## 2024-01-22 NOTE — TELEPHONE ENCOUNTER
----- Message from Sierra Mendez sent at 1/22/2024  9:33 AM CST -----  Contact: Self/[phone]  Requesting an RX refill or new RX.  Is this a refill or new RX: New  RX name and strength :  albuterol (PROVENTIL/VENTOLIN HFA) 90 mcg/actuation inhaler  Is this a 30 day or 90 day RX: 90  Pharmacy name and phone # :   [address] [place] - [address] [facility]  [address] [facility] [address]  Phone: [phone] Fax: [phone]    The doctors have asked that we provide their patients with the following 2 reminders -- prescription refills can take up to 72 hours, and a friendly reminder that in the future you can use your [facility] account to request refills: yes      Requesting an RX refill or new RX.  Is this a refill or new RX: New  RX name and strength :  rosuvastatin (CRESTOR) 20 MG tablet  Is this a 30 day or 90 day RX: 90  Pharmacy name and phone #:  [address] - [address] [facility]  [address] [facility] [address]  Phone: [phone] Fax: [phone]     The doctors have asked that we provide their patients with the following 2 reminders -- prescription refills can take up to 72 hours, and a friendly reminder that in the future you can use your [facility] account to request refills: yes

## 2024-01-23 RX ORDER — HYDROXYZINE HYDROCHLORIDE 25 MG/1
25 TABLET, FILM COATED ORAL 4 TIMES DAILY PRN
Qty: 270 TABLET | Refills: 3 | Status: SHIPPED | OUTPATIENT
Start: 2024-01-23

## 2024-01-23 RX ORDER — ALBUTEROL SULFATE 90 UG/1
2 AEROSOL, METERED RESPIRATORY (INHALATION) EVERY 6 HOURS PRN
Qty: 54 G | Refills: 0 | Status: SHIPPED | OUTPATIENT
Start: 2024-01-23

## 2024-01-23 RX ORDER — OMEPRAZOLE 40 MG/1
40 CAPSULE, DELAYED RELEASE ORAL EVERY MORNING
Qty: 90 CAPSULE | Refills: 0 | Status: SHIPPED | OUTPATIENT
Start: 2024-01-23

## 2024-01-23 RX ORDER — ROSUVASTATIN CALCIUM 20 MG/1
20 TABLET, COATED ORAL NIGHTLY
Qty: 90 TABLET | Refills: 0 | Status: SHIPPED | OUTPATIENT
Start: 2024-01-23 | End: 2025-01-22

## 2024-01-23 NOTE — TELEPHONE ENCOUNTER
Refill Routing Note   Medication(s) are not appropriate for processing by [facility] for the following reason(s):        Outside of protocol  Patient not seen by provider within 15 months  Required labs outdated  ED/Hospital Visit since last OV with provider    [facility] action(s):  Route  Defer               Appointments  past 12m or future 3m with PCP    Date Provider   Last Visit   10/13/2022 Giovanni Chi MD   Next Visit   Visit date not found Giovanni Chi MD   ED visits in past 90 days: 0        Note composed:7:00 AM 01/23/2024

## 2024-04-03 DIAGNOSIS — I10 HYPERTENSION: ICD-10-CM

## 2024-04-11 ENCOUNTER — OFFICE VISIT (OUTPATIENT)
Dept: SLEEP MEDICINE | Facility: CLINIC | Age: 74
End: 2024-04-11
Attending: PSYCHIATRY & NEUROLOGY
Payer: MEDICARE

## 2024-04-11 DIAGNOSIS — G47.33 OSA (OBSTRUCTIVE SLEEP APNEA): Primary | ICD-10-CM

## 2024-04-11 PROCEDURE — 99214 OFFICE O/P EST MOD 30 MIN: CPT | Mod: 95,,, | Performed by: PSYCHIATRY & NEUROLOGY

## 2024-04-11 NOTE — PROGRESS NOTES
The patient location is: home  The chief complaint leading to consultation is: sleep disorder  Visit type: audiovisual - was started initially -switched to audio later on due to technical limitations  Each patient to whom he or she provides medical services by telemedicine is:  (1) informed of the relationship between the physician and patient and the respective role of any other health care provider with respect to management of the patient; and (2) notified that he or she may decline to receive medical services by telemedicine and may withdraw from such care at any time.  31 minutes of total time spent on the encounter, which includes face to face time and non-face to face time preparing to see the patient (eg, review of tests), Obtaining and/or reviewing separately obtained history, Documenting clinical information in the electronic or other health record, Independently interpreting results (not separately reported) and communicating results to the patient/family/caregiver, or Care coordination (not separately reported).           10/26/2021     2:39 PM 3/30/2021    11:00 AM   EPWORTH SLEEPINESS SCALE TOTAL SCORE    Total score 10 12     Reports interrupted sleep; nasal congestion - got a heated hose; initially the hose felt too hot. Set for 64 - > really comfortable at her current settings.  Wants to get a heated hose and the  full face mask with her supplies.  Needed this visit to get new supplies.    Difficulty falling and staying asleep; feels her ability to sleep goes around the clock. Only getting 3-4 hrs sleep at night; then tries to compensate during the day - often times takes 25 mg Hydroxizine for a longer nap.      Bedtime 6 PM-> if not sleepy - will get up to do paper work;    Finds Atarax 1 pill ( 25 mg)  helpful -> makes her sleepy 3 hrs later; at times - takes it in AM to sleep some more. Even when she is able to fall asleep - only sleeps for 3-4 hours. Can not return to sleep after taking a  bathroom break.  Trazodone (previously prescribed at 50 mg) - stolpped after failing 50 mg dose, never tried to increase it    Nikia Ni 2024 - 04/10/2024 Patient ID: [id number] : 1950 Age: 73 years [facility] [address], [address] Compliance Report Compliance Payor Standard Usage 2024 - 04/10/2024 Usage days 30/30 days (100%) >= 4 hours 28 days (93%) < 4 hours 2 days (7%) Usage hours 197 hours 53 minutes Average usage (total days) 6 hours 36 minutes Average usage (days used) 6 hours 36 minutes Median usage (days used) 6 hours 51 minutes Total used hours (value since last reset - 04/10/2024) 6,350 hours AirSense 10 AutoSet Serial number [id number] Mode AutoSet Min Pressure 10 cmH2O Max Pressure 14 cmH2O EPR Fulltime EPR level 3 Response Standard Therapy Pressure - cmH2O Median: 10.8 95th percentile: 12.9 Maximum: 13.6 Leaks - L/min Median: 0.0 95th percentile: 0.0 Maximum: 2.1 Events per hour AI: 0.6 HI: 0.0 AHI: 0.6 Apnea Index Central: 0.1 Obstructive: 0.4 Unknown: 0.0 RERA Index 0.0 Cheyne-Banerjee respiration (average duration per night) 0 minutes (0%      Sleep Studies: .PSG 3/21: Significant Obstructive sleep apnea (OZIEL) with AHI (apnea hypopnea Index) of AH I 38.5 and RDI 49.1 and SaO2 of 83% (weight  291 lbs).    Bedtime: 5 PM  Sleep latency: 1-3 hrs  Awakenings: usually at 12-2 AM-> starts her day.        Assessment:   OZIEL, severe. Benefiting from CPAP use in terms of sleep continuity and daytime sleepiness. Needs new supplies  Insomnia NEC. 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  Recoememnded to call [facility]BT[facility] - and will send the referral to [facility]BT[facility] team  If Hydroxyzine fails, consider re-trial of Trazodone at higher doses.   Avoid blue light at night.      No using portal, will mail aortic stenosis to the patient,.      Follow up: me after 31 days  of PAP use.  31-minute visit. >50% spent counseling patient and coordination of care.

## 2024-04-11 NOTE — PATIENT INSTRUCTIONS
CBTi - Cognitive Behavior Therapy for insomnia      Plese call  [phone] to inquire      CBTI:     Treatment Information:  Insomnia-focused.  This treatment specifically focuses primarily on insomnia.  Therapy only.  Medication management is not included in this treatment.  Please discuss your medications with your referring provider.  Time-limited. This means that services with Dr. Greenwood are concluded when treatment ends.    CBT-i group will run for 6 weekly sessions.  Structured. CBT-i involves manualized treatment with structured and pre-determined topics, discussions, and practice assignments tailored to treat insomnia.  Active Treatment. Once admitted to CBT-i, treatment requires your consistent attendance and commitment to daily practice assignments.  Practice assignments include daily sleep diaries and changes in thoughts and behaviors related to sleep.      If you would like more detailed information about CBT-i, please visit the following websites:  https://www.acponline.org/acp-newsroom/efh-dozvuxstuc-xyblmthmw-behavioral-therapy-as-initial-treatment-for-chronic-insomnia  https://www.sleepfoundation.org/articles/cognitive-behavioral-therapy-insomnia      How to Join CBT-i:  If you are interested in attending CBT-i, please call the Department of Psychiatry appointment line ([phone] ) and request to speak to someone about CBTI.      Billing & Insurance:  Please check with your insurance about coverage for the group therapy sessions.  The CPT code is 09636 for a group session.  You can call your insurance directly or contact our patient , Mildred West, at [phone].      Location:  CBT-i will be located on the 4th floor of the [facility] in the Department of Psychiatry at [facility] on [facility] and telemedicine and individual options are now available.   For the first session, Dr. Greenwood will escort you from the waiting room to the group therapy  room.  For subsequent sessions, you may walk to the group therapy room on your own.  Please check in at the  before you walk back to room #456.  When you exit the waiting room, you will take your second right past the soda machine through the fire door (you may open this).  You will cross the hallway above the atrium and open the door to a hallway with elevators.  Past the elevators to the left is a glass door with an intercom on the left.  Press the button so the  can buzz you in.  Room #456 is located in the back of the long hallway on the right.  _____________      Otherwise,    Continue APAP at current settings; will fax CPAP supplies rx to [facility] - [facility] [employer] [facility][phone] to request supplies  Recommended to avoid daytime naps, or set up alarm to 20 minutes  Stop taking hydroxyzine during the day, instead, consider increasing nigh time dose from 25 mg to 50-75 mg - can split - 2 hrs before bed and then at bedtime.  If Hydroxyzine fails, consider re-trial of Trazodone at higher doses.   Avoid blue/white  light at night. Instead, use warmer yellow light sources.

## 2024-04-11 NOTE — Clinical Note
Please set on 12 months recall Please the prescription for CPAP supplies to [facility].  Thank you!

## 2024-04-16 ENCOUNTER — PATIENT MESSAGE (OUTPATIENT)
Dept: PSYCHIATRY | Facility: CLINIC | Age: 74
End: 2024-04-16
Payer: MEDICARE

## 2024-04-26 ENCOUNTER — TELEPHONE (OUTPATIENT)
Dept: SLEEP MEDICINE | Facility: CLINIC | Age: 74
End: 2024-04-26
Payer: MEDICARE

## 2024-04-26 NOTE — TELEPHONE ENCOUNTER
----- Message from Magaly Sparrow sent at 4/26/2024 12:12 PM CDT -----  Regarding: rx  Name of caller: Tania       What is the requesting detail: [facility] refused to refill her Rx. pt is requesting to have the orders for cpap equipment sent to [facility]  fax number [phone] rosalina marie       Can the clinic reply by [facility]:       What number to call back: [phone]

## 2024-04-30 ENCOUNTER — TELEPHONE (OUTPATIENT)
Dept: SLEEP MEDICINE | Facility: CLINIC | Age: 74
End: 2024-04-30
Payer: MEDICARE

## 2024-04-30 NOTE — TELEPHONE ENCOUNTER
----- Message from Violet Tim MA sent at 4/30/2024  3:09 PM CDT -----  Name of Who is Calling:GIOVANNA LUNDBERG [[id number]]                What is the request in detail: Pt is checking to see if the paper work was sent to [facility] for her CPAP supplies. Please assist.                Can the clinic reply by [facility]: No                What Number to Call Back if not in [facility]: [phone]

## 2024-05-22 DIAGNOSIS — G47.33 OSA (OBSTRUCTIVE SLEEP APNEA): Primary | ICD-10-CM

## 2024-05-27 ENCOUNTER — TELEPHONE (OUTPATIENT)
Dept: SLEEP MEDICINE | Facility: CLINIC | Age: 74
End: 2024-05-27
Payer: MEDICARE

## 2024-05-27 NOTE — TELEPHONE ENCOUNTER
----- Message from Alejandra Paula sent at 5/27/2024  9:16 AM CDT -----  Name of Who is Calling:    GIOVANNA LUNDBERG [[id number]]                 What is the request in detail: Pt is requesting a call back regarding her cpap supplies. Pt would like a copy of orders that was sent yo N. Please assist.                      Can the clinic reply by [facility]: No                    What Number to Call Back if not in [facility]:  [phone]

## 2024-06-10 ENCOUNTER — PATIENT MESSAGE (OUTPATIENT)
Dept: INTERNAL MEDICINE | Facility: CLINIC | Age: 74
End: 2024-06-10
Payer: MEDICARE

## 2024-07-25 ENCOUNTER — OFFICE VISIT (OUTPATIENT)
Dept: INTERNAL MEDICINE | Facility: CLINIC | Age: 74
End: 2024-07-25
Payer: MEDICARE

## 2024-07-25 VITALS
BODY MASS INDEX: 53.92 KG/M2 | HEIGHT: 62 IN | OXYGEN SATURATION: 95 % | SYSTOLIC BLOOD PRESSURE: 144 MMHG | WEIGHT: 293 LBS | DIASTOLIC BLOOD PRESSURE: 78 MMHG | HEART RATE: 65 BPM

## 2024-07-25 DIAGNOSIS — R10.13 EPIGASTRIC PAIN: Primary | ICD-10-CM

## 2024-07-25 DIAGNOSIS — R68.83 CHILLS: ICD-10-CM

## 2024-07-25 LAB
CTP QC/QA: YES
SARS-COV-2 RDRP RESP QL NAA+PROBE: NEGATIVE

## 2024-07-25 PROCEDURE — 99999 PR PBB SHADOW E&M-EST. PATIENT-LVL IV: CPT | Mod: PBBFAC,,, | Performed by: STUDENT IN AN ORGANIZED HEALTH CARE EDUCATION/TRAINING PROGRAM

## 2024-07-25 RX ORDER — DICYCLOMINE HYDROCHLORIDE 20 MG/1
20 TABLET ORAL 4 TIMES DAILY PRN
Qty: 120 TABLET | Refills: 0 | Status: SHIPPED | OUTPATIENT
Start: 2024-07-25 | End: 2024-08-24

## 2024-07-25 NOTE — PROGRESS NOTES
[facility] SAME DAY VISIT      CHIEF COMPLAINT:   Chief Complaint   Patient presents with    Back Pain       HISTORY OF PRESENT ILLNESS: Ni Montejo is a 73 y.o. female who presents here today for pain underneath left breast and left side of back. She feels this pain may be related to gas/bloating. She feels she has to burp but can't. Symptoms started yesterday and kept her awake overnight. She denies any recent injury to back or ribs. Denies bowel or bladder dysfunction, saddle anesthesia, neurologic symptoms, rashes/lesions. Detailed ROS as below. Patient has a history of acid reflux for which she take omeprazole. She is unsure if current symptoms are related to this.     During ROS patient does report subjective fever and chills last night in addition to chronic cough, sputum production, and shortness of breath. This makes her wonder if she possibly as COVID so would like to be tested for this too.    /80 today. Improved to 144/78 on repeat. She does not check BP at home. She forgot to take her blood pressure medicine this morning.        REVIEW OF SYSTEMS:    Review of Systems   Constitutional:  Positive for chills and fever.   HENT:  Positive for congestion. Negative for sore throat.    Respiratory:  Positive for cough, sputum production and shortness of breath. Negative for hemoptysis and wheezing.    Gastrointestinal:  Negative for heartburn, nausea and vomiting.   Musculoskeletal:  Positive for back pain.   Neurological:  Negative for dizziness, tingling, sensory change, focal weakness, weakness and headaches.         MEDICAL HISTORY:    Past Medical History:   Diagnosis Date    Allergies     Cataract     GERD (gastroesophageal reflux disease)     Hypertension     Memory changes     Palpitations        MEDICATIONS:    Current Outpatient Medications on File Prior to Visit   Medication Sig Dispense Refill    albuterol (PROVENTIL/VENTOLIN HFA) 90 mcg/actuation inhaler Inhale 2 puffs into the  "lungs every 6 (six) hours as needed for Wheezing or Shortness of Breath (And cough). Use with spacer Dispense with 1 spacer 54 g 0    aspirin (ECOTRIN) 81 MG EC tablet Take 81 mg by mouth once daily.      cyanocobalamin, vitamin B-12, 1,000 mcg Subl Place 5,000 mcg under the tongue once daily.       fluticasone propionate (FLONASE) 50 mcg/actuation nasal spray 1 spray (50 mcg total) by Each Nostril route 2 (two) times daily. 16 g 11    hydrOXYzine HCL (ATARAX) 25 MG tablet Take 1 tablet (25 mg total) by mouth 4 (four) times daily as needed for Itching. 270 tablet 3    LINOLEIC ACID, BULK, MISC 400 mg by Misc.(Non-Drug; Combo Route) route.      losartan-hydrochlorothiazide 50-12.5 mg (HYZAAR) 50-12.5 mg per tablet Take 1 tablet by mouth once daily. 90 tablet 3    multivitamin capsule Take 1 capsule by mouth once daily.      omeprazole (PRILOSEC) 40 MG capsule Take 1 capsule (40 mg total) by mouth every morning. 90 capsule 0    PHYTONADIONE, VIT K1, (VITAMIN K1 MISC) by Misc.(Non-Drug; Combo Route) route. Super K with advanced K2 complex      potassium 99 mg Tab Take by mouth once.      rosuvastatin (CRESTOR) 20 MG tablet Take 1 tablet (20 mg total) by mouth every evening. 90 tablet 0    acetaminophen (TYLENOL) 500 MG tablet Take 1 tablet (500 mg total) by mouth every 6 (six) hours as needed for Pain (As needed for mild-to-moderate pain). 30 tablet 0    diclofenac sodium (VOLTAREN) 1 % Gel Apply 2 g topically 4 (four) times daily as needed (Apply to painful area 4 times a day as needed for pain). 200 g 0    ibuprofen (ADVIL,MOTRIN) 600 MG tablet Take 1 tablet (600 mg total) by mouth every 6 (six) hours as needed for Pain (Take with food as needed for mild-to-moderate pain). 20 tablet 0    traZODone (DESYREL) 50 MG tablet Take 1 tablet (50 mg total) by mouth once daily. 90 tablet 1     No current facility-administered medications on file prior to visit.         PHYSICAL EXAM:    BP (!) 144/78   Pulse 65   Ht 5' 2" " (1.575 m)   Wt 135 kg (297 lb 9.9 oz)   LMP  (LMP Unknown)   SpO2 95%   BMI 54.44 kg/m²     Physical Exam  Vitals and nursing note reviewed.   Constitutional:       General: She is not in acute distress.     Appearance: Normal appearance. She is obese. She is not ill-appearing, toxic-appearing or diaphoretic.   HENT:      Head: Normocephalic and atraumatic.      Nose: Nose normal.   Eyes:      Extraocular Movements: Extraocular movements intact.      Conjunctiva/sclera: Conjunctivae normal.      Pupils: Pupils are equal, round, and reactive to light.   Cardiovascular:      Rate and Rhythm: Normal rate and regular rhythm.      Heart sounds: Normal heart sounds. No murmur heard.  Pulmonary:      Effort: Pulmonary effort is normal. No respiratory distress.      Breath sounds: Normal breath sounds. No wheezing.   Chest:       Abdominal:      Tenderness: There is abdominal tenderness in the epigastric area and left upper quadrant. There is no guarding or rebound.   Musculoskeletal:         General: No deformity. Normal range of motion.      Cervical back: No bony tenderness.      Thoracic back: Tenderness present. No swelling, deformity or bony tenderness.      Lumbar back: No bony tenderness.        Back:    Skin:     Findings: No lesion or rash.   Neurological:      General: No focal deficit present.      Mental Status: She is alert.      Motor: No weakness.      Gait: Gait normal.   Psychiatric:         Mood and Affect: Mood normal.         Behavior: Behavior normal.         Thought Content: Thought content normal.         Judgment: Judgment normal.             LABS:    Results for orders placed or performed in visit on 07/25/24   POCT COVID-19 Rapid Screening   Result Value Ref Range    POC Rapid COVID Negative Negative     Acceptable Yes            ASSESSMENT & PLAN:    Ni was seen today for back pain.    Diagnoses and all orders for this visit:    Epigastric pain  Patient presenting with pain  under left breast radiating around to left side of upper back x 1 day. No alarm s/s. No preceding injury or event.   Hx relevant for acid reflux, on omeprazole, occasionally misses doses.   On exam, abdomen is soft. Tenderness to palpation of epigastric region and LUQ. No guarding or rebound. Pain under left breast/LUQ but no rib/chest wall pain. Pain left mid back. No bony tenderness. No rash/lesion.   Ddx: GERD, H pylori, PUD, gastritis, gastroparesis, pancreatitis, cholecystitis, hepatitis, liver disease  Labs as below to evaluate further.  Bentyl PRN for pain relief.   Continue Omeprazole as prescribed.   ER/return precautions discussed.  -     Amylase; Future  -     H. pylori Antibody, IgG; Future  -     Hepatitis Panel, Acute; Future  -     Lipase; Future  -     Comprehensive Metabolic Panel; Future  -     dicyclomine (BENTYL) 20 mg tablet; Take 1 tablet (20 mg total) by mouth 4 (four) times daily as needed (abdominal pain).    Chills  During ROS patient does report subjective fever and chills last night in addition to chronic cough, sputum production, and shortness of breath.   COVID test negative in office today.   Symptoms appear to be chronic and VSS - no further evaluation/treatment at this time.   -     POCT COVID-19 Rapid Screening              Shirley Carranza MD  [facility]

## 2024-07-25 NOTE — PATIENT INSTRUCTIONS
Labs have been ordered. Please have completed at your earliest convenience.     Dicyclomine (Bentyl) has been prescribed for pain relief - take as needed up to 4 times per day.     Continue Omeprazole (Prilosec) as been prescribed.    Please go to the ER if you experience any worsening or severe symptoms.

## 2024-10-27 DIAGNOSIS — R05.8 UPPER AIRWAY COUGH SYNDROME: ICD-10-CM

## 2024-10-28 RX ORDER — FLUTICASONE PROPIONATE 50 MCG
1 SPRAY, SUSPENSION (ML) NASAL 2 TIMES DAILY
Qty: 48 G | Refills: 0 | Status: SHIPPED | OUTPATIENT
Start: 2024-10-28

## 2024-12-30 DIAGNOSIS — K21.9 GASTROESOPHAGEAL REFLUX DISEASE WITHOUT ESOPHAGITIS: ICD-10-CM

## 2024-12-30 DIAGNOSIS — R10.13 EPIGASTRIC PAIN: ICD-10-CM

## 2024-12-30 DIAGNOSIS — I10 ESSENTIAL HYPERTENSION, BENIGN: ICD-10-CM

## 2024-12-30 DIAGNOSIS — J30.2 SEASONAL ALLERGIES: ICD-10-CM

## 2024-12-30 NOTE — TELEPHONE ENCOUNTER
No care due was identified.  Health [facility] Embedded Care Due Messages. Reference number: [id number].   12/30/2024 12:52:23 PM CST

## 2024-12-30 NOTE — TELEPHONE ENCOUNTER
Care Due:                  Date            Visit Type   Department     Provider  --------------------------------------------------------------------------------                                EP -                              PRIMARY      [facility] INTER[facility]  Last Visit: 07-      CARE ([facility])   MEDICINE       Shirley Carranza  Next Visit: None Scheduled  None         None Found                                                            Last  Test          Frequency    Reason                     Performed    Due Date  --------------------------------------------------------------------------------    CMP.........  12 months..  losartan-hydrochlorothiaz  Not Found    Overdue                             myah, rosuvastatin........    Lipid Panel.  12 months..  rosuvastatin.............  02- 02-    Health [facility] Embedded Care Due Messages. Reference number: [id number].   12/30/2024 12:52:05 PM CST

## 2024-12-30 NOTE — TELEPHONE ENCOUNTER
----- Message from Dedra sent at 12/30/2024  8:23 AM CST -----  Contact: [phone]  Requesting an RX refill or new RX.    Is this a refill or new RX: refill    RX name and strength (copy/paste from chart):  omeprazole (PRILOSEC) 40 MG capsule     Is this a 30 day or 90 day RX: 90    Pharmacy name and phone # (copy/paste from chart):    [address] [address]  Phone: [phone] Fax: [phone]    The doctors have asked that we provide their patients with the following 2 reminders -- prescription refills can take up to 72 hours, and a friendly reminder that in the future you can use your [facility] account to request refills: yes    Requesting an RX refill or new RX.    Is this a refill or new RX: refill    RX name and strength (copy/paste from chart):  losartan-hydrochlorothiazide 50-12.5 mg (HYZAAR) 50-12.5 mg per tablet     Is this a 30 day or 90 day RX: 90    Pharmacy name and phone # (copy/paste from chart):    [address] [address]  Phone: [phone] Fax: [phone]    The doctors have asked that we provide their patients with the following 2 reminders -- prescription refills can take up to 72 hours, and a friendly reminder that in the future you can use your [facility] account to request refills: yes    Requesting an RX refill or new RX.    Is this a refill or new RX: refill    RX name and strength (copy/paste from chart):  Dicyclomine 20 MG    Is this a 30 day or 90 day RX: 90    Pharmacy name and phone # (copy/paste from chart):    [address] [address]  Phone: [phone] Fax: [phone]    The doctors have asked that we provide their patients with the following 2 reminders -- prescription refills can take up to 72 hours, and a friendly reminder that in the future you can use your  [facility] account to request refills: yes      Pt states she is completely out of medications and requesting this be called in urgently. Please call her with an update to let her know

## 2024-12-31 DIAGNOSIS — K21.9 GASTROESOPHAGEAL REFLUX DISEASE WITHOUT ESOPHAGITIS: ICD-10-CM

## 2024-12-31 RX ORDER — DICYCLOMINE HYDROCHLORIDE 20 MG/1
TABLET ORAL
Qty: 120 TABLET | Refills: 0 | Status: SHIPPED | OUTPATIENT
Start: 2024-12-31

## 2024-12-31 RX ORDER — LOSARTAN POTASSIUM AND HYDROCHLOROTHIAZIDE 12.5; 5 MG/1; MG/1
1 TABLET ORAL DAILY
Qty: 90 TABLET | Refills: 0 | Status: SHIPPED | OUTPATIENT
Start: 2024-12-31

## 2024-12-31 RX ORDER — OMEPRAZOLE 40 MG/1
40 CAPSULE, DELAYED RELEASE ORAL EVERY MORNING
Qty: 90 CAPSULE | Refills: 0 | OUTPATIENT
Start: 2024-12-31

## 2024-12-31 RX ORDER — HYDROXYZINE HYDROCHLORIDE 25 MG/1
TABLET, FILM COATED ORAL
Qty: 270 TABLET | Refills: 0 | Status: SHIPPED | OUTPATIENT
Start: 2024-12-31

## 2024-12-31 RX ORDER — OMEPRAZOLE 40 MG/1
40 CAPSULE, DELAYED RELEASE ORAL EVERY MORNING
Qty: 90 CAPSULE | Refills: 0 | Status: SHIPPED | OUTPATIENT
Start: 2024-12-31

## 2024-12-31 NOTE — TELEPHONE ENCOUNTER
Refill Routing Note   Medication(s) are not appropriate for processing by [facility] for the following reason(s):        Required vitals abnormal  Required labs outdated  Patient not seen by provider within 15 months  ED/Hospital Visit since last OV with provider    [facility] action(s):  Defer   Requires labs : Yes             Appointments  past 12m or future 3m with PCP    Date Provider   Last Visit   10/13/2022 Giovanni Chi MD   Next Visit   12/30/2024 Giovanni Chi MD   ED visits in past 90 days: 0        Note composed:8:51 AM 12/31/2024

## 2024-12-31 NOTE — TELEPHONE ENCOUNTER
Pt is humberto to dr. Carranza on 1/2, she is very frustrated regarding her blood pressure medication, will you be able to send a prescription over to her pharmacy to cover she cannot wait and also requesting another medication

## 2024-12-31 NOTE — TELEPHONE ENCOUNTER
Refill Routing Note   Medication(s) are not appropriate for processing by [facility] for the following reason(s):        Outside of protocol    [facility] action(s):  Route             Appointments  past 12m or future 3m with PCP    Date Provider   Last Visit   10/13/2022 Giovanni Chi MD   Next Visit   Visit date not found Giovanni Chi MD   ED visits in past 90 days: 0        Note composed:8:34 AM 12/31/2024

## 2024-12-31 NOTE — TELEPHONE ENCOUNTER
Refill Routing Note   Medication(s) are not appropriate for processing by [facility] for the following reason(s):        Outside of protocol    [facility] action(s):  Route               Appointments  past 12m or future 3m with PCP    Date Provider   Last Visit   10/13/2022 Giovanni Chi MD   Next Visit   12/30/2024 Giovanni Chi MD   ED visits in past 90 days: 0        Note composed:6:52 PM 12/30/2024

## 2024-12-31 NOTE — TELEPHONE ENCOUNTER
----- Message from Peggy sent at 12/31/2024  9:50 AM CST -----  Contact: Mobile  [phone]  Patient said that she put in an order for medications on December thirtieth and she would liek to know what is the status on her order?

## 2024-12-31 NOTE — TELEPHONE ENCOUNTER
Refill Decision Note   Ni Montejo  is requesting a refill authorization.  Brief Assessment and Rationale for Refill:  Quick Discontinue     Medication Therapy Plan:  Receipt confirmed by pharmacy (12/31/2024  9:27 AM CST)      Comments:     Note composed:4:24 PM 12/31/2024

## 2024-12-31 NOTE — TELEPHONE ENCOUNTER
----- Message from Peggy sent at 12/31/2024  9:50 AM CST -----  Contact: Mobile  [phone]  Patient said that she put in an order for medications on December thirtieth and she would liek to know what is the status on her order?

## 2024-12-31 NOTE — TELEPHONE ENCOUNTER
The patient is requesting for the following medications to be sent over to her pharmacy in regards she is out of the medications . The patient is requesting for Omeprazole, Dicyclomine, Losartan and Hydroxyzine. The patient is frustrated in regards to she states she needs to the medications for her blood pressure.

## 2024-12-31 NOTE — TELEPHONE ENCOUNTER
No care due was identified.  [facility] Embedded Care Due Messages. Reference number: [id number].   12/31/2024 10:24:11 AM CST

## 2025-01-04 ENCOUNTER — HOSPITAL ENCOUNTER (OUTPATIENT)
Facility: HOSPITAL | Age: 75
Discharge: HOME | End: 2025-01-05
Attending: EMERGENCY MEDICINE | Admitting: HOSPITALIST
Payer: MEDICARE

## 2025-01-04 DIAGNOSIS — E87.6 HYPOKALEMIA: Primary | ICD-10-CM

## 2025-01-04 DIAGNOSIS — R07.9 CHEST PAIN: ICD-10-CM

## 2025-01-04 DIAGNOSIS — M79.89 PAIN AND SWELLING OF LOWER LEG: ICD-10-CM

## 2025-01-04 DIAGNOSIS — N39.0 ACUTE UTI: ICD-10-CM

## 2025-01-04 DIAGNOSIS — M79.669 PAIN AND SWELLING OF LOWER LEG: ICD-10-CM

## 2025-01-04 PROBLEM — R00.1 BRADYCARDIA: Status: ACTIVE | Noted: 2025-01-04

## 2025-01-04 LAB
ALBUMIN SERPL-MCNC: 3.7 G/DL (ref 3.3–5.5)
ALP SERPL-CCNC: 79 U/L (ref 42–141)
ANION GAP SERPL CALC-SCNC: 12 MMOL/L (ref 8–16)
BILIRUB SERPL-MCNC: 0.8 MG/DL (ref 0.2–1.6)
BILIRUBIN, POC UA: NEGATIVE
BLOOD, POC UA: NEGATIVE
BUN SERPL-MCNC: 7 MG/DL (ref 8–23)
BUN SERPL-MCNC: 9 MG/DL (ref 7–22)
CALCIUM SERPL-MCNC: 8.3 MG/DL (ref 8.7–10.5)
CALCIUM SERPL-MCNC: 9.4 MG/DL (ref 8–10.3)
CHLORIDE SERPL-SCNC: 100 MMOL/L (ref 98–108)
CHLORIDE SERPL-SCNC: 102 MMOL/L (ref 95–110)
CHOLEST SERPL-MCNC: 202 MG/DL (ref 120–199)
CHOLEST/HDLC SERPL: 3.8 {RATIO} (ref 2–5)
CLARITY, UA: CLEAR
CO2 SERPL-SCNC: 28 MMOL/L (ref 23–29)
COLOR, UA: YELLOW
CREAT SERPL-MCNC: 0.8 MG/DL (ref 0.5–1.4)
CREAT SERPL-MCNC: 0.8 MG/DL (ref 0.6–1.2)
EST. GFR  (NO RACE VARIABLE): >60 ML/MIN/1.73 M^2
GLUCOSE SERPL-MCNC: 102 MG/DL (ref 73–118)
GLUCOSE SERPL-MCNC: 90 MG/DL (ref 70–110)
GLUCOSE, POC UA: NEGATIVE
HCT, POC: NORMAL
HDLC SERPL-MCNC: 53 MG/DL (ref 40–75)
HDLC SERPL: 26.2 % (ref 20–50)
HGB, POC: NORMAL (ref 14–18)
KETONES, POC UA: NEGATIVE
LDLC SERPL CALC-MCNC: 125.8 MG/DL (ref 63–159)
LEUKOCYTE EST, POC UA: ABNORMAL
MAGNESIUM SERPL-MCNC: 1.9 MG/DL (ref 1.6–2.6)
MCH, POC: NORMAL
MCHC, POC: NORMAL
MCV, POC: NORMAL
MPV, POC: NORMAL
NITRITE, POC UA: NEGATIVE
NONHDLC SERPL-MCNC: 149 MG/DL
PH UR STRIP: 6.5 [PH] (ref 5–8)
POC ALT (SGPT): 14 U/L (ref 10–47)
POC AST (SGOT): 25 U/L (ref 11–38)
POC B-TYPE NATRIURETIC PEPTIDE: 112 PG/ML (ref 0–100)
POC CARDIAC TROPONIN I: 0 NG/ML (ref 0–0.08)
POC PLATELET COUNT: NORMAL
POC PTINR: 1.1 (ref 0.9–1.2)
POC PTWBT: 11.4 SEC (ref 9.7–14.3)
POC TCO2: 32 MMOL/L (ref 18–33)
POTASSIUM BLD-SCNC: 2.8 MMOL/L (ref 3.6–5.1)
POTASSIUM SERPL-SCNC: 3.2 MMOL/L (ref 3.5–5.1)
POTASSIUM SERPL-SCNC: 3.2 MMOL/L (ref 3.5–5.1)
PROTEIN, POC UA: ABNORMAL
PROTEIN, POC: 8.4 G/DL (ref 6.4–8.1)
RBC, POC: NORMAL
RDW, POC: NORMAL
SAMPLE: NORMAL
SAMPLE: NORMAL
SODIUM BLD-SCNC: 134 MMOL/L (ref 128–145)
SODIUM SERPL-SCNC: 142 MMOL/L (ref 136–145)
SPECIFIC GRAVITY, POC UA: 1.02 (ref 1–1.03)
TRIGL SERPL-MCNC: 116 MG/DL (ref 30–150)
TROPONIN I SERPL DL<=0.01 NG/ML-MCNC: 0.01 NG/ML (ref 0–0.03)
TROPONIN I SERPL DL<=0.01 NG/ML-MCNC: <0.006 NG/ML (ref 0–0.03)
TROPONIN I SERPL DL<=0.01 NG/ML-MCNC: <0.006 NG/ML (ref 0–0.03)
UROBILINOGEN, POC UA: 0.2 E.U./DL
WBC, POC: NORMAL

## 2025-01-04 PROCEDURE — 96365 THER/PROPH/DIAG IV INF INIT: CPT

## 2025-01-04 PROCEDURE — 99900035 HC TECH TIME PER 15 MIN (STAT)

## 2025-01-04 PROCEDURE — 80048 BASIC METABOLIC PNL TOTAL CA: CPT | Performed by: NURSE PRACTITIONER

## 2025-01-04 PROCEDURE — 96367 TX/PROPH/DG ADDL SEQ IV INF: CPT

## 2025-01-04 PROCEDURE — 93005 ELECTROCARDIOGRAM TRACING: CPT

## 2025-01-04 PROCEDURE — 25000242 PHARM REV CODE 250 ALT 637 W/ HCPCS: Mod: ER | Performed by: EMERGENCY MEDICINE

## 2025-01-04 PROCEDURE — 93005 ELECTROCARDIOGRAM TRACING: CPT | Mod: ER

## 2025-01-04 PROCEDURE — 87086 URINE CULTURE/COLONY COUNT: CPT | Performed by: EMERGENCY MEDICINE

## 2025-01-04 PROCEDURE — 63600175 PHARM REV CODE 636 W HCPCS: Performed by: NURSE PRACTITIONER

## 2025-01-04 PROCEDURE — 63600175 PHARM REV CODE 636 W HCPCS: Mod: ER | Performed by: EMERGENCY MEDICINE

## 2025-01-04 PROCEDURE — 25000003 PHARM REV CODE 250: Performed by: NURSE PRACTITIONER

## 2025-01-04 PROCEDURE — 84484 ASSAY OF TROPONIN QUANT: CPT | Mod: 91 | Performed by: NURSE PRACTITIONER

## 2025-01-04 PROCEDURE — 83735 ASSAY OF MAGNESIUM: CPT | Performed by: NURSE PRACTITIONER

## 2025-01-04 PROCEDURE — 94660 CPAP INITIATION&MGMT: CPT

## 2025-01-04 PROCEDURE — 25000003 PHARM REV CODE 250: Mod: ER | Performed by: EMERGENCY MEDICINE

## 2025-01-04 PROCEDURE — G0378 HOSPITAL OBSERVATION PER HR: HCPCS | Mod: ER

## 2025-01-04 PROCEDURE — 80061 LIPID PANEL: CPT | Performed by: NURSE PRACTITIONER

## 2025-01-04 PROCEDURE — 85610 PROTHROMBIN TIME: CPT

## 2025-01-04 PROCEDURE — 93010 ELECTROCARDIOGRAM REPORT: CPT | Mod: ,,, | Performed by: INTERNAL MEDICINE

## 2025-01-04 PROCEDURE — G0378 HOSPITAL OBSERVATION PER HR: HCPCS

## 2025-01-04 PROCEDURE — 25000242 PHARM REV CODE 250 ALT 637 W/ HCPCS: Performed by: NURSE PRACTITIONER

## 2025-01-04 PROCEDURE — 96375 TX/PRO/DX INJ NEW DRUG ADDON: CPT

## 2025-01-04 PROCEDURE — 99285 EMERGENCY DEPT VISIT HI MDM: CPT | Mod: 25

## 2025-01-04 PROCEDURE — 27000190 HC CPAP FULL FACE MASK W/VALVE

## 2025-01-04 PROCEDURE — 96372 THER/PROPH/DIAG INJ SC/IM: CPT | Performed by: NURSE PRACTITIONER

## 2025-01-04 RX ORDER — CEFTRIAXONE 1 G/1
1 INJECTION, POWDER, FOR SOLUTION INTRAMUSCULAR; INTRAVENOUS
Status: COMPLETED | OUTPATIENT
Start: 2025-01-04 | End: 2025-01-04

## 2025-01-04 RX ORDER — ALBUTEROL SULFATE 90 UG/1
2 INHALANT RESPIRATORY (INHALATION) EVERY 6 HOURS PRN
Status: DISCONTINUED | OUTPATIENT
Start: 2025-01-04 | End: 2025-01-04 | Stop reason: SDUPTHER

## 2025-01-04 RX ORDER — NITROGLYCERIN 0.4 MG/1
0.4 TABLET SUBLINGUAL EVERY 5 MIN PRN
Status: DISCONTINUED | OUTPATIENT
Start: 2025-01-04 | End: 2025-01-04

## 2025-01-04 RX ORDER — HYDROXYZINE HYDROCHLORIDE 25 MG/1
25 TABLET, FILM COATED ORAL 4 TIMES DAILY PRN
Status: DISCONTINUED | OUTPATIENT
Start: 2025-01-04 | End: 2025-01-05 | Stop reason: HOSPADM

## 2025-01-04 RX ORDER — ALBUTEROL SULFATE 1.25 MG/3ML
2.5 SOLUTION RESPIRATORY (INHALATION) EVERY 6 HOURS PRN
Status: DISCONTINUED | OUTPATIENT
Start: 2025-01-04 | End: 2025-01-05 | Stop reason: HOSPADM

## 2025-01-04 RX ORDER — HYDROXYZINE HYDROCHLORIDE 25 MG/1
25 TABLET, FILM COATED ORAL 4 TIMES DAILY PRN
Status: DISCONTINUED | OUTPATIENT
Start: 2025-01-04 | End: 2025-01-04

## 2025-01-04 RX ORDER — ASPIRIN 325 MG
325 TABLET ORAL
Status: COMPLETED | OUTPATIENT
Start: 2025-01-04 | End: 2025-01-04

## 2025-01-04 RX ORDER — ACETAMINOPHEN 325 MG/1
650 TABLET ORAL
Status: COMPLETED | OUTPATIENT
Start: 2025-01-04 | End: 2025-01-04

## 2025-01-04 RX ORDER — ASPIRIN 81 MG/1
81 TABLET ORAL DAILY
Status: DISCONTINUED | OUTPATIENT
Start: 2025-01-05 | End: 2025-01-05 | Stop reason: HOSPADM

## 2025-01-04 RX ORDER — MORPHINE SULFATE 4 MG/ML
2 INJECTION, SOLUTION INTRAMUSCULAR; INTRAVENOUS EVERY 4 HOURS PRN
Status: DISCONTINUED | OUTPATIENT
Start: 2025-01-04 | End: 2025-01-05 | Stop reason: HOSPADM

## 2025-01-04 RX ORDER — MAGNESIUM SULFATE 1 G/100ML
1 INJECTION INTRAVENOUS
Status: COMPLETED | OUTPATIENT
Start: 2025-01-04 | End: 2025-01-04

## 2025-01-04 RX ORDER — LOSARTAN POTASSIUM AND HYDROCHLOROTHIAZIDE 12.5; 5 MG/1; MG/1
1 TABLET ORAL DAILY
Status: DISCONTINUED | OUTPATIENT
Start: 2025-01-04 | End: 2025-01-05

## 2025-01-04 RX ORDER — CEFTRIAXONE 1 G/1
1 INJECTION, POWDER, FOR SOLUTION INTRAMUSCULAR; INTRAVENOUS
Status: DISCONTINUED | OUTPATIENT
Start: 2025-01-05 | End: 2025-01-04

## 2025-01-04 RX ORDER — DICYCLOMINE HYDROCHLORIDE 10 MG/1
20 CAPSULE ORAL 4 TIMES DAILY PRN
Status: DISCONTINUED | OUTPATIENT
Start: 2025-01-04 | End: 2025-01-05 | Stop reason: HOSPADM

## 2025-01-04 RX ORDER — SODIUM CHLORIDE 0.9 % (FLUSH) 0.9 %
10 SYRINGE (ML) INJECTION
Status: DISCONTINUED | OUTPATIENT
Start: 2025-01-04 | End: 2025-01-05 | Stop reason: HOSPADM

## 2025-01-04 RX ORDER — ENOXAPARIN SODIUM 100 MG/ML
40 INJECTION SUBCUTANEOUS EVERY 24 HOURS
Status: DISCONTINUED | OUTPATIENT
Start: 2025-01-04 | End: 2025-01-05 | Stop reason: HOSPADM

## 2025-01-04 RX ORDER — PANTOPRAZOLE SODIUM 40 MG/1
40 TABLET, DELAYED RELEASE ORAL DAILY
Status: DISCONTINUED | OUTPATIENT
Start: 2025-01-04 | End: 2025-01-05 | Stop reason: HOSPADM

## 2025-01-04 RX ORDER — HYDRALAZINE HYDROCHLORIDE 20 MG/ML
10 INJECTION INTRAMUSCULAR; INTRAVENOUS EVERY 6 HOURS PRN
Status: DISCONTINUED | OUTPATIENT
Start: 2025-01-04 | End: 2025-01-05 | Stop reason: HOSPADM

## 2025-01-04 RX ORDER — ATORVASTATIN CALCIUM 40 MG/1
80 TABLET, FILM COATED ORAL NIGHTLY
Status: DISCONTINUED | OUTPATIENT
Start: 2025-01-04 | End: 2025-01-05

## 2025-01-04 RX ORDER — POTASSIUM CHLORIDE 7.45 MG/ML
10 INJECTION INTRAVENOUS
Status: COMPLETED | OUTPATIENT
Start: 2025-01-04 | End: 2025-01-04

## 2025-01-04 RX ORDER — NITROGLYCERIN 0.4 MG/1
0.4 TABLET SUBLINGUAL
Status: COMPLETED | OUTPATIENT
Start: 2025-01-04 | End: 2025-01-04

## 2025-01-04 RX ORDER — ACETAMINOPHEN 325 MG/1
650 TABLET ORAL EVERY 6 HOURS PRN
Status: DISCONTINUED | OUTPATIENT
Start: 2025-01-04 | End: 2025-01-05 | Stop reason: HOSPADM

## 2025-01-04 RX ORDER — ONDANSETRON HYDROCHLORIDE 2 MG/ML
4 INJECTION, SOLUTION INTRAVENOUS EVERY 6 HOURS PRN
Status: DISCONTINUED | OUTPATIENT
Start: 2025-01-04 | End: 2025-01-05 | Stop reason: HOSPADM

## 2025-01-04 RX ORDER — POTASSIUM CHLORIDE 20 MEQ/1
40 TABLET, EXTENDED RELEASE ORAL ONCE
Status: COMPLETED | OUTPATIENT
Start: 2025-01-04 | End: 2025-01-04

## 2025-01-04 RX ORDER — FLUTICASONE PROPIONATE 50 MCG
1 SPRAY, SUSPENSION (ML) NASAL 2 TIMES DAILY
Status: DISCONTINUED | OUTPATIENT
Start: 2025-01-04 | End: 2025-01-05 | Stop reason: HOSPADM

## 2025-01-04 RX ADMIN — NITROGLYCERIN 0.4 MG: 0.4 TABLET, ORALLY DISINTEGRATING SUBLINGUAL at 08:01

## 2025-01-04 RX ADMIN — POTASSIUM BICARBONATE 25 MEQ: 977.5 TABLET, EFFERVESCENT ORAL at 07:01

## 2025-01-04 RX ADMIN — MORPHINE SULFATE 2 MG: 4 INJECTION INTRAVENOUS at 02:01

## 2025-01-04 RX ADMIN — HYDROXYZINE HYDROCHLORIDE 25 MG: 25 TABLET ORAL at 06:01

## 2025-01-04 RX ADMIN — ASPIRIN 325 MG ORAL TABLET 325 MG: 325 PILL ORAL at 06:01

## 2025-01-04 RX ADMIN — MAGNESIUM SULFATE IN DEXTROSE 1 G: 10 INJECTION, SOLUTION INTRAVENOUS at 09:01

## 2025-01-04 RX ADMIN — ATORVASTATIN CALCIUM 80 MG: 40 TABLET, FILM COATED ORAL at 08:01

## 2025-01-04 RX ADMIN — SODIUM CHLORIDE 250 ML: 0.9 INJECTION, SOLUTION INTRAVENOUS at 07:01

## 2025-01-04 RX ADMIN — POTASSIUM CHLORIDE 40 MEQ: 1500 TABLET, EXTENDED RELEASE ORAL at 01:01

## 2025-01-04 RX ADMIN — ACETAMINOPHEN 650 MG: 325 TABLET ORAL at 09:01

## 2025-01-04 RX ADMIN — POTASSIUM CHLORIDE 10 MEQ: 7.46 INJECTION, SOLUTION INTRAVENOUS at 07:01

## 2025-01-04 RX ADMIN — CEFTRIAXONE 1 G: 1 INJECTION, POWDER, FOR SOLUTION INTRAMUSCULAR; INTRAVENOUS at 08:01

## 2025-01-04 RX ADMIN — LOSARTAN POTASSIUM AND HYDROCHLOROTHIAZIDE 1 TABLET: 12.5; 5 TABLET ORAL at 12:01

## 2025-01-04 RX ADMIN — ENOXAPARIN SODIUM 40 MG: 40 INJECTION SUBCUTANEOUS at 03:01

## 2025-01-04 RX ADMIN — FLUTICASONE PROPIONATE 50 MCG: 50 SPRAY, METERED NASAL at 08:01

## 2025-01-04 RX ADMIN — PANTOPRAZOLE SODIUM 40 MG: 40 TABLET, DELAYED RELEASE ORAL at 12:01

## 2025-01-04 NOTE — Clinical Note
Diagnosis: Hypokalemia [409246]   Future Attending Provider: WAYLON MUELLER [[id number]]   Special Needs:: Fall Risk [15]
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
## 2025-01-04 NOTE — ED NOTES
"Pt reports chest pain "feels a bit better" approx 5 min. After nitro SL administration. Pt now complaining of a HA and states that nitro was burning her tongue. MD notified.  "

## 2025-01-04 NOTE — ED PROVIDER NOTES
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 01/05/2025 3.6  cm Final    TV resting pulmonary artery pressu* 01/05/2025 53  mmHg Final    RV TB RVSP 01/05/2025 7  mmHg Final    Est. RA pres 01/05/2025 3  mmHg Final    Cholesterol 01/04/2025 202 (H)  120 - 199 mg/dL Final    Comment: The National Cholesterol Education Program (NCEP) has set the  following guidelines (reference ranges) for Cholesterol:  Optimal.....................<200 mg/dL  Borderline High.............200-239 mg/dL  High........................> or = 240 mg/dL      Triglycerides 01/04/2025 116  30 - 150 mg/dL Final    Comment: The National Cholesterol Education Program (NCEP) has set the  following guidelines (reference values) for triglycerides:  Normal......................<150 mg/dL  Borderline High.............150-199 mg/dL  High........................200-499 mg/dL      HDL 01/04/2025 53  40 - 75 mg/dL Final    Comment: The National Cholesterol Education Program (NCEP) has set the  following guidelines (reference values) for HDL Cholesterol:  Low...............<40 mg/dL  Optimal...........>60 mg/dL      LDL Cholesterol 01/04/2025 125.8  63.0 - 159.0 mg/dL Final    Comment: The National Cholesterol Education Program (NCEP) has set the  following guidelines (reference values) for LDL Cholesterol:  Optimal.......................<130 mg/dL  Borderline  High...............130-159 mg/dL  High..........................160-189 mg/dL  Very High.....................>190 mg/dL      HDL/Cholesterol Ratio 01/04/2025 26.2  20.0 - 50.0 % Final    Total Cholesterol/HDL Ratio 01/04/2025 3.8  2.0 - 5.0 Final    Non-HDL Cholesterol 01/04/2025 149  mg/dL Final    Comment: Risk category and Non-HDL cholesterol goals:  Coronary heart disease (CHD)or equivalent (10-year risk of CHD >20%):  Non-HDL cholesterol goal     <130 mg/dL  Two or more CHD risk factors and 10-year risk of CHD <= 20%:  Non-HDL cholesterol goal     <160 mg/dL  0 to 1 CHD risk factor:  Non-HDL cholesterol goal     <190 mg/dL      Sodium 01/04/2025 142  136 - 145 mmol/L Final    Potassium 01/04/2025 3.2 (L)  3.5 - 5.1 mmol/L Final    Chloride 01/04/2025 102  95 - 110 mmol/L Final    CO2 01/04/2025 28  23 - 29 mmol/L Final    Glucose 01/04/2025 90  70 - 110 mg/dL Final    BUN 01/04/2025 7 (L)  8 - 23 mg/dL Final    Creatinine 01/04/2025 0.8  0.5 - 1.4 mg/dL Final    Calcium 01/04/2025 8.3 (L)  8.7 - 10.5 mg/dL Final    Anion Gap 01/04/2025 12  8 - 16 mmol/L Final    eGFR 01/04/2025 >60  >60 mL/min/1.73 m^2 Final    Troponin I 01/04/2025 0.008  0.000 - 0.026 ng/mL Final    Comment: The reference interval for Troponin I represents the 99th percentile   cutoff   for our facility and is consistent with 3rd generation assay   performance.      Troponin I 01/04/2025 <0.006  0.000 - 0.026 ng/mL Final    Comment: The reference interval for Troponin I represents the 99th percentile   cutoff   for our facility and is consistent with 3rd generation assay   performance.      Troponin I 01/04/2025 <0.006  0.000 - 0.026 ng/mL Final    Comment: The reference interval for Troponin I represents the 99th percentile   cutoff   for our facility and is consistent with 3rd generation assay   performance.      Sodium 01/05/2025 141  136 - 145 mmol/L Final    Potassium 01/05/2025 3.4 (L)  3.5 - 5.1 mmol/L Final    Chloride 01/05/2025 105   95 - 110 mmol/L Final    CO2 01/05/2025 27  23 - 29 mmol/L Final    Glucose 01/05/2025 98  70 - 110 mg/dL Final    BUN 01/05/2025 9  8 - 23 mg/dL Final    Creatinine 01/05/2025 0.9  0.5 - 1.4 mg/dL Final    Calcium 01/05/2025 8.3 (L)  8.7 - 10.5 mg/dL Final    Anion Gap 01/05/2025 9  8 - 16 mmol/L Final    eGFR 01/05/2025 >60  >60 mL/min/1.73 m^2 Final    Magnesium 01/05/2025 1.8  1.6 - 2.6 mg/dL Final    Phosphorus 01/05/2025 3.3  2.7 - 4.5 mg/dL Final    WBC 01/05/2025 7.19  3.90 - 12.70 K/uL Final    RBC 01/05/2025 4.51  4.00 - 5.40 M/uL Final    Hemoglobin 01/05/2025 12.7  12.0 - 16.0 g/dL Final    Hematocrit 01/05/2025 39.3  37.0 - 48.5 % Final    MCV 01/05/2025 87  82 - 98 fL Final    MCH 01/05/2025 28.2  27.0 - 31.0 pg Final    MCHC 01/05/2025 32.3  32.0 - 36.0 g/dL Final    RDW 01/05/2025 15.6 (H)  11.5 - 14.5 % Final    Platelets 01/05/2025 270  150 - 450 K/uL Final    MPV 01/05/2025 10.7  9.2 - 12.9 fL Final    Immature Granulocytes 01/05/2025 0.3  0.0 - 0.5 % Final    Gran # (ANC) 01/05/2025 2.8  1.8 - 7.7 K/uL Final    Immature Grans (Abs) 01/05/2025 0.02  0.00 - 0.04 K/uL Final    Comment: Mild elevation in immature granulocytes is non specific and   can be seen in a variety of conditions including stress response,   acute inflammation, trauma and pregnancy. Correlation with other   laboratory and clinical findings is essential.      Lymph # 01/05/2025 3.5  1.0 - 4.8 K/uL Final    Mono # 01/05/2025 0.6  0.3 - 1.0 K/uL Final    Eos # 01/05/2025 0.2  0.0 - 0.5 K/uL Final    Baso # 01/05/2025 0.08  0.00 - 0.20 K/uL Final    nRBC 01/05/2025 0  0 /100 WBC Final    Gran % 01/05/2025 38.8  38.0 - 73.0 % Final    Lymph % 01/05/2025 48.4 (H)  18.0 - 48.0 % Final    Mono % 01/05/2025 8.5  4.0 - 15.0 % Final    Eosinophil % 01/05/2025 2.9  0.0 - 8.0 % Final    Basophil % 01/05/2025 1.1  0.0 - 1.9 % Final    Differential Method 01/05/2025 Automated   Final    Troponin I 01/05/2025 <0.006  0.000 - 0.026 ng/mL Final     Comment: The reference interval for Troponin I represents the 99th percentile   cutoff   for our facility and is consistent with 3rd generation assay   performance.      Potassium 01/05/2025 4.6  3.5 - 5.1 mmol/L Final    Specimen moderately hemolyzed        Imaging Results              US Lower Extremity Veins Bilateral (Final result)  Result time 01/04/25 10:46:20      Final result by Tom Adams MD (01/04/25 10:46:20)                   Impression:      No definite evidence of deep venous thrombosis in either lower extremity noting markedly limited examination due to patient body habitus..      Electronically signed by: Tom Adams MD  Date:    01/04/2025  Time:    10:46               Narrative:    EXAMINATION:  US LOWER EXTREMITY VEINS BILATERAL    CLINICAL HISTORY:  Pain in unspecified lower leg    TECHNIQUE:  Duplex and color flow Doppler and dynamic compression was performed of the bilateral lower extremity veins was performed.    COMPARISON:  None    FINDINGS:  Secondly limited examination due to patient body habitus.    Right thigh veins: The common femoral, femoral, popliteal, upper greater saphenous, and deep femoral veins are patent and free of thrombus noting the mid and distal portion of the femoral vein were not well visualized..  The veins are normally compressible and have normal phasic flow and augmentation response.    Right calf veins: The visualized calf veins are patent.    Left thigh veins: The common femoral, femoral, popliteal, upper greater saphenous, and deep femoral veins are patent and free of thrombus. The veins are normally compressible and have normal phasic flow and augmentation response.    Left calf veins: The visualized calf veins are patent noting the anterior tibial vein not well visualized..    Miscellaneous: None                                       X-Ray Chest AP Portable (Final result)  Result time 01/04/25 06:50:09      Final result by Олег Love MD  (01/04/25 06:50:09)                   Impression:      1. Findings may reflect edema or other pneumonitis noting accentuation by habitus.  No large focal consolidation.      Electronically signed by: Олег Love MD  Date:    01/04/2025  Time:    06:50               Narrative:    EXAMINATION:  XR CHEST AP PORTABLE    CLINICAL HISTORY:  Chest Pain;    TECHNIQUE:  Single frontal view of the chest was performed.    COMPARISON:  05/13/2023    FINDINGS:  The cardiomediastinal silhouette is prominent, magnified by technique noting calcification of the aorta..  There is no pleural effusion.  The trachea is midline.  The lungs are symmetrically expanded bilaterally with coarse interstitial attenuation, accentuated by habitus.  There may be a few scattered calcified granuloma..  No large focal consolidation seen.  There is no pneumothorax.  The osseous structures are remarkable for degenerative change..                                                 I, Dr. Hemalatha Ohara, personally performed the services described in this documentation. This document was produced by a scribe under my direction and in my presence. All medical record entries made by the scribe were at my direction and in my presence.  I have reviewed the chart and agree that the record reflects my personal performance and is accurate and complete. Hemalatha Ohara DO.     01/08/2025 6:45 AM       Clinical Impression:  Final diagnoses:  [R07.9] Chest pain  [M79.669, M79.89] Pain and swelling of lower leg  [E87.6] Hypokalemia (Primary)  [N39.0] Acute UTI          ED Disposition Condition    Observation Stable                Hemalatha Ohara DO  01/08/25 0635

## 2025-01-04 NOTE — ED NOTES
KAJAL Gonzalez NP notified that patient requesting kidney, liver, pancreas testing as it runs in her family.

## 2025-01-04 NOTE — ED NOTES
Pt upset of quality of cardiac diet.  Reports she does not like what was presented before her and that she wasn't given a choice of food.  Dietary at bedside.  Explained cooks are no longer here today.  Offered to bring her a sandwhich.  Pt also expressed displeasure of not being in a private room.  Explained hospital is full and currently holding in ed.  Pt remains upset despite therapuetic communication and apology her meal was not to her satisfaction.
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
## 2025-01-04 NOTE — ASSESSMENT & PLAN NOTE
Patient's most recent potassium results are listed below.   Recent Labs     01/04/25  1157   K 3.2*  3.2*     Plan  - Replete potassium per protocol  - Monitor potassium Daily  - Patient's hypokalemia is stable

## 2025-01-04 NOTE — ASSESSMENT & PLAN NOTE
Patient's blood pressure range in the last 24 hours was: BP  Min: 127/69  Max: 186/77.The patient's inpatient anti-hypertensive regimen is listed below:  Current Antihypertensives  losartan-hydrochlorothiazide 50-12.5 mg per tablet 1 tablet, Daily, Oral  hydrALAZINE injection 10 mg, Every 6 hours PRN, Intravenous    Plan  - uncontrolled, resume home meds   - hydralazine as needed  - monitor BP

## 2025-01-04 NOTE — HPI
74 year old female with past medical history of hypertension, obesity present to [facility] ED for chest tightness onset 1 day ago. Patient reports constant chest tightness to her right chest that worsens with movement. Patient reports that her current pain is a 4/10. Patient reports that her pain initially started as chest pain but states that it would completely alleviate when she would belch. Chest pain resolved with dose of nitroglycerin. Patient K level found to be 2.8. which she received potassium replacement. She currently denies any chest pain, shortness of breath, nausea, vomiting, or abdominal pain. She was transferred to [facility] for further evaluation by cardiologist.

## 2025-01-04 NOTE — ASSESSMENT & PLAN NOTE
Body mass index is 54.87 kg/m². Morbid obesity complicates all aspects of disease management from diagnostic modalities to treatment. Weight loss encouraged and health benefits explained to patient.

## 2025-01-05 VITALS
TEMPERATURE: 98 F | HEART RATE: 58 BPM | DIASTOLIC BLOOD PRESSURE: 74 MMHG | SYSTOLIC BLOOD PRESSURE: 142 MMHG | HEIGHT: 62 IN | BODY MASS INDEX: 53.92 KG/M2 | OXYGEN SATURATION: 98 % | RESPIRATION RATE: 22 BRPM | WEIGHT: 293 LBS

## 2025-01-05 PROBLEM — E78.49 OTHER HYPERLIPIDEMIA: Status: ACTIVE | Noted: 2025-01-05

## 2025-01-05 LAB
ANION GAP SERPL CALC-SCNC: 9 MMOL/L (ref 8–16)
ASCENDING AORTA: 2.96 CM
AV INDEX (PROSTH): 0.8
AV MEAN GRADIENT: 4.6 MMHG
AV PEAK GRADIENT: 9 MMHG
AV VALVE AREA BY VELOCITY RATIO: 2.5 CM²
AV VALVE AREA: 2.8 CM²
AV VELOCITY RATIO: 0.73
BASOPHILS # BLD AUTO: 0.08 K/UL (ref 0–0.2)
BASOPHILS NFR BLD: 1.1 % (ref 0–1.9)
BSA FOR ECHO PROCEDURE: 2.44 M2
BUN SERPL-MCNC: 9 MG/DL (ref 8–23)
CALCIUM SERPL-MCNC: 8.3 MG/DL (ref 8.7–10.5)
CHLORIDE SERPL-SCNC: 105 MMOL/L (ref 95–110)
CO2 SERPL-SCNC: 27 MMOL/L (ref 23–29)
CREAT SERPL-MCNC: 0.9 MG/DL (ref 0.5–1.4)
CV ECHO LV RWT: 0.47 CM
DIFFERENTIAL METHOD BLD: ABNORMAL
DOP CALC AO PEAK VEL: 1.5 M/S
DOP CALC AO VTI: 35 CM
DOP CALC LVOT AREA: 3.5 CM2
DOP CALC LVOT DIAMETER: 2.1 CM
DOP CALC LVOT PEAK VEL: 1.1 M/S
DOP CALC LVOT STROKE VOLUME: 96.6 CM3
DOP CALCLVOT PEAK VEL VTI: 27.9 CM
E WAVE DECELERATION TIME: 216.18 MSEC
E/A RATIO: 1.2
E/E' RATIO: 10 M/S
ECHO LV POSTERIOR WALL: 1 CM (ref 0.6–1.1)
EOSINOPHIL # BLD AUTO: 0.2 K/UL (ref 0–0.5)
EOSINOPHIL NFR BLD: 2.9 % (ref 0–8)
ERYTHROCYTE [DISTWIDTH] IN BLOOD BY AUTOMATED COUNT: 15.6 % (ref 11.5–14.5)
EST. GFR  (NO RACE VARIABLE): >60 ML/MIN/1.73 M^2
FRACTIONAL SHORTENING: 41.9 % (ref 28–44)
GLUCOSE SERPL-MCNC: 98 MG/DL (ref 70–110)
HCT VFR BLD AUTO: 39.3 % (ref 37–48.5)
HGB BLD-MCNC: 12.7 G/DL (ref 12–16)
IMM GRANULOCYTES # BLD AUTO: 0.02 K/UL (ref 0–0.04)
IMM GRANULOCYTES NFR BLD AUTO: 0.3 % (ref 0–0.5)
INTERVENTRICULAR SEPTUM: 1 CM (ref 0.6–1.1)
IVC DIAMETER: 1.49 CM
IVRT: 94.2 MSEC
LA MAJOR: 5.86 CM
LA MINOR: 5.8 CM
LA WIDTH: 4.5 CM
LEFT ATRIUM SIZE: 5.04 CM
LEFT ATRIUM VOLUME INDEX: 49.5 ML/M2
LEFT ATRIUM VOLUME: 112.39 CM3
LEFT INTERNAL DIMENSION IN SYSTOLE: 2.5 CM (ref 2.1–4)
LEFT VENTRICLE DIASTOLIC VOLUME INDEX: 37.24 ML/M2
LEFT VENTRICLE DIASTOLIC VOLUME: 84.53 ML
LEFT VENTRICLE MASS INDEX: 62.8 G/M2
LEFT VENTRICLE SYSTOLIC VOLUME INDEX: 10.1 ML/M2
LEFT VENTRICLE SYSTOLIC VOLUME: 23.03 ML
LEFT VENTRICULAR INTERNAL DIMENSION IN DIASTOLE: 4.3 CM (ref 3.5–6)
LEFT VENTRICULAR MASS: 142.5 G
LV LATERAL E/E' RATIO: 8.18 M/S
LV SEPTAL E/E' RATIO: 12.86 M/S
LVED V (TEICH): 84.53 ML
LVES V (TEICH): 23.03 ML
LVOT MG: 2.55 MMHG
LVOT MV: 0.76 CM/S
LYMPHOCYTES # BLD AUTO: 3.5 K/UL (ref 1–4.8)
LYMPHOCYTES NFR BLD: 48.4 % (ref 18–48)
MAGNESIUM SERPL-MCNC: 1.8 MG/DL (ref 1.6–2.6)
MCH RBC QN AUTO: 28.2 PG (ref 27–31)
MCHC RBC AUTO-ENTMCNC: 32.3 G/DL (ref 32–36)
MCV RBC AUTO: 87 FL (ref 82–98)
MONOCYTES # BLD AUTO: 0.6 K/UL (ref 0.3–1)
MONOCYTES NFR BLD: 8.5 % (ref 4–15)
MV PEAK A VEL: 0.75 M/S
MV PEAK E VEL: 0.9 M/S
MV STENOSIS PRESSURE HALF TIME: 62.69 MS
MV VALVE AREA P 1/2 METHOD: 3.51 CM2
NEUTROPHILS # BLD AUTO: 2.8 K/UL (ref 1.8–7.7)
NEUTROPHILS NFR BLD: 38.8 % (ref 38–73)
NRBC BLD-RTO: 0 /100 WBC
OHS CV RV/LV RATIO: 0.86 CM
OHS QRS DURATION: 102 MS
OHS QTC CALCULATION: 465 MS
PHOSPHATE SERPL-MCNC: 3.3 MG/DL (ref 2.7–4.5)
PISA TR MAX VEL: 3.55 M/S
PLATELET # BLD AUTO: 270 K/UL (ref 150–450)
PMV BLD AUTO: 10.7 FL (ref 9.2–12.9)
POTASSIUM SERPL-SCNC: 3.4 MMOL/L (ref 3.5–5.1)
POTASSIUM SERPL-SCNC: 4.6 MMOL/L (ref 3.5–5.1)
PV PEAK GRADIENT: 6 MMHG
PV PEAK VELOCITY: 1.24 M/S
RA MAJOR: 5 CM
RA PRESSURE ESTIMATED: 3 MMHG
RA WIDTH: 3.6 CM
RBC # BLD AUTO: 4.51 M/UL (ref 4–5.4)
RIGHT VENTRICLE DIASTOLIC BASEL DIMENSION: 3.7 CM
RIGHT VENTRICULAR END-DIASTOLIC DIMENSION: 3.68 CM
RV TB RVSP: 7 MMHG
RV TISSUE DOPPLER FREE WALL SYSTOLIC VELOCITY 1 (APICAL 4 CHAMBER VIEW): 14.65 CM/S
SINUS: 3.08 CM
SODIUM SERPL-SCNC: 141 MMOL/L (ref 136–145)
STJ: 2.58 CM
TDI LATERAL: 0.11 M/S
TDI SEPTAL: 0.07 M/S
TDI: 0.09 M/S
TR MAX PG: 50 MMHG
TRICUSPID ANNULAR PLANE SYSTOLIC EXCURSION: 2.36 CM
TROPONIN I SERPL DL<=0.01 NG/ML-MCNC: <0.006 NG/ML (ref 0–0.03)
TROPONIN I SERPL DL<=0.01 NG/ML-MCNC: <0.006 NG/ML (ref 0–0.03)
TV PEAK GRADIENT: 3 MMHG
TV REST PULMONARY ARTERY PRESSURE: 53 MMHG
WBC # BLD AUTO: 7.19 K/UL (ref 3.9–12.7)
Z-SCORE OF LEFT VENTRICULAR DIMENSION IN END DIASTOLE: -6.74
Z-SCORE OF LEFT VENTRICULAR DIMENSION IN END SYSTOLE: -5.64

## 2025-01-05 PROCEDURE — 99214 OFFICE O/P EST MOD 30 MIN: CPT | Mod: 25,,, | Performed by: INTERNAL MEDICINE

## 2025-01-05 PROCEDURE — 84484 ASSAY OF TROPONIN QUANT: CPT | Performed by: NURSE PRACTITIONER

## 2025-01-05 PROCEDURE — G0378 HOSPITAL OBSERVATION PER HR: HCPCS

## 2025-01-05 PROCEDURE — 25000003 PHARM REV CODE 250: Performed by: NURSE PRACTITIONER

## 2025-01-05 PROCEDURE — 85025 COMPLETE CBC W/AUTO DIFF WBC: CPT | Performed by: NURSE PRACTITIONER

## 2025-01-05 PROCEDURE — 80048 BASIC METABOLIC PNL TOTAL CA: CPT | Performed by: NURSE PRACTITIONER

## 2025-01-05 PROCEDURE — 84132 ASSAY OF SERUM POTASSIUM: CPT | Performed by: NURSE PRACTITIONER

## 2025-01-05 PROCEDURE — 94761 N-INVAS EAR/PLS OXIMETRY MLT: CPT

## 2025-01-05 PROCEDURE — 84100 ASSAY OF PHOSPHORUS: CPT | Performed by: NURSE PRACTITIONER

## 2025-01-05 PROCEDURE — 83735 ASSAY OF MAGNESIUM: CPT | Performed by: NURSE PRACTITIONER

## 2025-01-05 RX ORDER — ATORVASTATIN CALCIUM 20 MG/1
20 TABLET, FILM COATED ORAL NIGHTLY
Qty: 90 TABLET | Refills: 3 | Status: SHIPPED | OUTPATIENT
Start: 2025-01-05 | End: 2026-01-05

## 2025-01-05 RX ORDER — LOSARTAN POTASSIUM AND HYDROCHLOROTHIAZIDE 12.5; 5 MG/1; MG/1
2 TABLET ORAL DAILY
Status: DISCONTINUED | OUTPATIENT
Start: 2025-01-06 | End: 2025-01-05 | Stop reason: HOSPADM

## 2025-01-05 RX ORDER — LOSARTAN POTASSIUM AND HYDROCHLOROTHIAZIDE 12.5; 5 MG/1; MG/1
2 TABLET ORAL DAILY
Qty: 180 TABLET | Refills: 3 | Status: SHIPPED | OUTPATIENT
Start: 2025-01-06 | End: 2026-01-06

## 2025-01-05 RX ORDER — LOSARTAN POTASSIUM AND HYDROCHLOROTHIAZIDE 12.5; 5 MG/1; MG/1
1 TABLET ORAL ONCE
Status: DISCONTINUED | OUTPATIENT
Start: 2025-01-05 | End: 2025-01-05

## 2025-01-05 RX ORDER — POTASSIUM CHLORIDE 20 MEQ/1
40 TABLET, EXTENDED RELEASE ORAL ONCE
Status: COMPLETED | OUTPATIENT
Start: 2025-01-05 | End: 2025-01-05

## 2025-01-05 RX ORDER — LOSARTAN POTASSIUM AND HYDROCHLOROTHIAZIDE 12.5; 5 MG/1; MG/1
1 TABLET ORAL ONCE
Status: COMPLETED | OUTPATIENT
Start: 2025-01-05 | End: 2025-01-05

## 2025-01-05 RX ORDER — ATORVASTATIN CALCIUM 10 MG/1
20 TABLET, FILM COATED ORAL NIGHTLY
Status: DISCONTINUED | OUTPATIENT
Start: 2025-01-05 | End: 2025-01-05 | Stop reason: HOSPADM

## 2025-01-05 RX ADMIN — FLUTICASONE PROPIONATE 50 MCG: 50 SPRAY, METERED NASAL at 08:01

## 2025-01-05 RX ADMIN — LOSARTAN POTASSIUM AND HYDROCHLOROTHIAZIDE 1 TABLET: 12.5; 5 TABLET ORAL at 08:01

## 2025-01-05 RX ADMIN — ASPIRIN 81 MG: 81 TABLET, COATED ORAL at 08:01

## 2025-01-05 RX ADMIN — LOSARTAN POTASSIUM AND HYDROCHLOROTHIAZIDE 1 TABLET: 12.5; 5 TABLET ORAL at 12:01

## 2025-01-05 RX ADMIN — PANTOPRAZOLE SODIUM 40 MG: 40 TABLET, DELAYED RELEASE ORAL at 08:01

## 2025-01-05 RX ADMIN — POTASSIUM CHLORIDE 40 MEQ: 1500 TABLET, EXTENDED RELEASE ORAL at 09:01

## 2025-01-05 NOTE — ASSESSMENT & PLAN NOTE
Sinus bradycardia  No associated symptoms  Avoid AV anna blockers  No advanced AV node or SA node exit block noted on telemetry    Outpatient follow up

## 2025-01-05 NOTE — ASSESSMENT & PLAN NOTE
Atypical chest pain  EKG nonischemic  Troponin negative    Doubt acute coronary syndrome  Check echocardiogram  Outpatient stress test recommended

## 2025-01-05 NOTE — ASSESSMENT & PLAN NOTE
Blood pressure needs to be better controlled  Increase losartan HCTZ to 110-25 daily  May need to add low-dose amlodipine if blood pressure remains elevated

## 2025-01-05 NOTE — ASSESSMENT & PLAN NOTE
Start atorvastatin 20 mg daily      The 10-year ASCVD risk score (Keron BLOUNT, et al., 2019) is: 18.7%    Values used to calculate the score:      Age: 74 years      Sex: Female      Is Non- : Yes      Diabetic: No      Tobacco smoker: No      Systolic Blood Pressure: 158 mmHg      Is BP treated: Yes      HDL Cholesterol: 53 mg/dL      Total Cholesterol: 202 mg/dL
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
## 2025-01-05 NOTE — HOSPITAL COURSE
Admitted for chest pain and elevated blood pressure. Atypical chest pain. EKG nonischemic. Troponin negative. Cardiology consulted with recs: Doubt acute coronary syndrome. Outpatient stress test recommended.  Echo showed Normal systolic function with no wall motion abnormalities. No hemodynamically significant valvular pathologies.She does have indeterminate diastolic function and elevated pulmonary artery systolic pressure for which she needs to follow with cardiology. She can be discharged from cardiac perspective. Follow with Cardiology within a month.Hypertension: Blood pressure needs to be better controlled. Increase losartan HCTZ to 110-25 daily. May need to add low-dose amlodipine if blood pressure remains elevated. Hyperlipidemia: Start atorvastatin 20 mg daily. Manual /78. replaced K level as needed.

## 2025-01-05 NOTE — HPI
Ms. Swanson is a 74-year-old female with past medical history of hypertension who was admitted to the hospital for evaluation of chest tightness going on for the last couple of days.    Cardiology is consulted for further management.    She said that her chest pain started couple of days ago that got worse with body movement and deep breaths.  No association with exertion.  No associated shortness of breath.  No orthopnea or PND.  No lower extremity edema.  Never had this chest pain before.    When I saw her in the ED she was hypertensive and bradycardic but she denied chest pain or dizziness.    She does not smoke or drink.    No family history of premature CAD.

## 2025-01-05 NOTE — DISCHARGE SUMMARY
[facility] Emergency Dept  Hospital Medicine  Discharge Summary      Patient Name: Ni Montejo  MRN: [id number]  PRISCILA: [id number]  Patient Class: OP- Observation  Admission Date: 1/4/2025  Hospital Length of Stay: 0 days  Discharge Date and Time:  01/05/2025 2:18 PM  Attending Physician: Rigoberto Garcia, *   Discharging Provider: Sandra Gonzalez NP  Primary Care Provider: Giovanni Chi MD    Primary Care Team:  Hosp Med CYNDI 2    HPI:   74 year old female with past medical history of hypertension, obesity present to [facility] ED for chest tightness onset 1 day ago. Patient reports constant chest tightness to her right chest that worsens with movement. Patient reports that her current pain is a 4/10. Patient reports that her pain initially started as chest pain but states that it would completely alleviate when she would belch. Chest pain resolved with dose of nitroglycerin. Patient K level found to be 2.8. which she received potassium replacement. She currently denies any chest pain, shortness of breath, nausea, vomiting, or abdominal pain. She was transferred to [facility] for further evaluation by cardiologist.       * No surgery found *      Hospital Course:   Admitted for chest pain and elevated blood pressure. Atypical chest pain. EKG nonischemic. Troponin negative. Cardiology consulted with recs: Doubt acute coronary syndrome. Outpatient stress test recommended.  Echo showed Normal systolic function with no wall motion abnormalities. No hemodynamically significant valvular pathologies.She does have indeterminate diastolic function and elevated pulmonary artery systolic pressure for which she needs to follow with cardiology. She can be discharged from cardiac perspective. Follow with Cardiology within a month.Hypertension: Blood pressure needs to be better controlled. Increase losartan HCTZ to 110-25 daily. May need to add low-dose amlodipine if blood pressure remains elevated. Hyperlipidemia:  Start atorvastatin 20 mg daily. Manual /78. replaced K level as needed.      Goals of Care Treatment Preferences:  Code Status: Full Code         Consults:   Consults (From admission, onward)          Status Ordering Provider     Inpatient consult to Cardiology  Once        Provider:  Chantal Tafoya MD    Completed ASHLEY CASAS            * Atypical chest pain  Consulted cards  Echo pending  Trend troponin  Tele monitoring   EKG as needed       Bradycardia  Secondary to nitroglycerin  See above       Hypokalemia  Patient's most recent potassium results are listed below.   Recent Labs     01/04/25  1157   K 3.2*  3.2*     Plan  - Replete potassium per protocol  - Monitor potassium Daily  - Patient's hypokalemia is stable      OZIEL (obstructive sleep apnea)  CPAP at bedtime      GERD (gastroesophageal reflux disease)  PPI      Cardiac murmur  History noted       Class 3 severe obesity due to excess calories without serious comorbidity with body mass index (BMI) of 50.0 to 59.9 in adult  Body mass index is 54.87 kg/m². Morbid obesity complicates all aspects of disease management from diagnostic modalities to treatment. Weight loss encouraged and health benefits explained to patient.         Hypertension  Patient's blood pressure range in the last 24 hours was: BP  Min: 127/69  Max: 186/77.The patient's inpatient anti-hypertensive regimen is listed below:  Current Antihypertensives  losartan-hydrochlorothiazide 50-12.5 mg per tablet 1 tablet, Daily, Oral  hydrALAZINE injection 10 mg, Every 6 hours PRN, Intravenous    Plan  - uncontrolled, resume home meds   - hydralazine as needed  - monitor BP      Final Active Diagnoses:    Diagnosis Date Noted POA    PRINCIPAL PROBLEM:  Atypical chest pain [R07.89] 07/24/2017 Yes    Other hyperlipidemia [E78.49] 01/05/2025 Yes    Hypokalemia [E87.6] 01/04/2025 Unknown    Bradycardia [R00.1] 01/04/2025 Unknown    OZIEL (obstructive sleep apnea) [G47.33]  Yes    GERD  (gastroesophageal reflux disease) [K21.9] 09/13/2017 Yes    Cardiac murmur [R01.1] 07/24/2017 Yes    Class 3 severe obesity due to excess calories without serious comorbidity with body mass index (BMI) of 50.0 to 59.9 in adult [E66.813, Z68.43, E66.01] 07/24/2017 Not Applicable    Hypertension [I10]  Yes      Problems Resolved During this Admission:       Discharged Condition: stable    Disposition: Home or Self Care    Follow Up:   Follow-up Information       Giovanni Chi MD. Schedule an appointment as soon as possible for a visit in 1 week(s).    Specialty: Internal Medicine  Why: If symptoms worsen, As needed  Contact information:  [address] [facility] [address]  [phone]               Chantal Gracia MD Follow up in 1 month(s).    Specialty: Cardiology  Why: If symptoms worsen, As needed  Contact information:  [address] [facility] [address]  [address]  [facility] [id number] [phone]                           Patient Instructions:      Ambulatory referral/consult to Cardiology   Standing Status: Future   Referral Priority: Routine Referral Type: Consultation   Referral Reason: Specialty Services Required   Referred to Provider: CHANTAL GRACIA Requested Specialty: Cardiology   Number of Visits Requested: 1     Nuclear Stress - Cardiology Interpreted   Standing Status: Future Standing Exp. Date: 01/05/26     Order Specific Question Answer Comments   Which stress agent will be used? Pharm    Which medicaton for the stress procedure? Regadenoson    Release to patient Immediate        Significant Diagnostic Studies: Labs: BMP:   Recent Labs   Lab 01/04/25  1157 01/05/25 0427 01/05/25  1316   GLU 90 98  --     141  --    K 3.2*  3.2* 3.4* 4.6    105  --    CO2 28 27  --    BUN 7* 9  --    CREATININE 0.8 0.9  --    CALCIUM 8.3* 8.3*  --    MG 1.9 1.8  --    , CMP   Recent Labs   Lab 01/04/25  1157 01/05/25 0427 01/05/25  1316    141  --    K 3.2*  3.2* 3.4* 4.6    105  --    CO2  "28 27  --    GLU 90 98  --    BUN 7* 9  --    CREATININE 0.8 0.9  --    CALCIUM 8.3* 8.3*  --    ANIONGAP 12 9  --    , CBC   Recent Labs   Lab 01/05/25 0427   WBC 7.19   HGB 12.7   HCT 39.3      , INR   Lab Results   Component Value Date    INR 1.1 01/04/2025    INR 1.1 05/13/2023    INR 1.1 11/30/2020   , Lipid Panel   Lab Results   Component Value Date    CHOL 202 (H) 01/04/2025    HDL 53 01/04/2025    LDLCALC 125.8 01/04/2025    TRIG 116 01/04/2025    CHOLHDL 26.2 01/04/2025   , Troponin   Recent Labs   Lab 01/04/25  1945 01/04/25  2341 01/05/25 0427   TROPONINI <0.006 <0.006 <0.006   , A1C: No results for input(s): "HGBA1C" in the last 4320 hours., and All labs within the past 24 hours have been reviewed    Pending Diagnostic Studies:       None           Medications:  Reconciled Home Medications:      Medication List        START taking these medications      atorvastatin 20 MG tablet  Commonly known as: LIPITOR  Take 1 tablet (20 mg total) by mouth every evening.  Replaces: rosuvastatin 20 MG tablet            CHANGE how you take these medications      losartan-hydrochlorothiazide 50-12.5 mg 50-12.5 mg per tablet  Commonly known as: HYZAAR  Take 2 tablets by mouth once daily.  Start taking on: January 6, 2025  What changed: how much to take            CONTINUE taking these medications      albuterol 90 mcg/actuation inhaler  Commonly known as: PROVENTIL/VENTOLIN HFA  Inhale 2 puffs into the lungs every 6 (six) hours as needed for Wheezing or Shortness of Breath (And cough). Use with spacer Dispense with 1 spacer     aspirin 81 MG EC tablet  Commonly known as: ECOTRIN  Take 81 mg by mouth once daily.     cyanocobalamin (vitamin B-12) 1,000 mcg Subl  Place 5,000 mcg under the tongue once daily.     dicyclomine 20 mg tablet  Commonly known as: BENTYL  TAKE 1 TABLET BY MOUTH 4 TIMES DAILY AS NEEDED FOR  ABDOMINAL  PAIN     fluticasone propionate 50 mcg/actuation nasal spray  Commonly known as: FLONASE  1 " spray (50 mcg total) by Each Nostril route 2 (two) times daily.     hydrOXYzine HCL 25 MG tablet  Commonly known as: ATARAX  TAKE 1 TABLET BY MOUTH 4 TIMES DAILY AS NEEDED FOR ITCHING     LINOLEIC ACID (BULK) MISC  400 mg by Misc.(Non-Drug; Combo Route) route.     multivitamin capsule  Take 1 capsule by mouth once daily.     omeprazole 40 MG capsule  Commonly known as: PRILOSEC  Take 1 capsule by mouth in the morning     potassium 99 mg Tab  Take by mouth once.     VITAMIN K1 MISC  by Misc.(Non-Drug; Combo Route) route. Super K with advanced K2 complex            STOP taking these medications      rosuvastatin 20 MG tablet  Commonly known as: CRESTOR  Replaced by: atorvastatin 20 MG tablet              Indwelling Lines/Drains at time of discharge:   Lines/Drains/Airways       None                   Time spent on the discharge of patient: greater than 30 minutes         Sandra Gonzalez NP  Department of Hospital Medicine  [facility] [place] - Emergency Dept

## 2025-01-05 NOTE — CARE UPDATE
Brief cardiology note    Echocardiogram reviewed.  Normal systolic function with no wall motion abnormalities.    No hemodynamically significant valvular pathologies.    She does have indeterminate diastolic function and elevated pulmonary artery systolic pressure for which she needs to follow with cardiology.    She can be discharged from cardiac perspective.    Follow with Cardiology within a month.    Cardiology will sign off.        Chantal Tafoya MD  [facility] Cardiology

## 2025-01-05 NOTE — DISCHARGE INSTRUCTIONS
Needs to maintain BP log so that she can bring that to Cardiology office for titration of BP meds
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
## 2025-01-05 NOTE — ED NOTES
Patient requesting an alternate meal tray. Stated that she hasn't gotten the menu options since she has been in ED. Dietary was called and patient given options. Dietary to bring new tray for patient.

## 2025-01-06 LAB
BACTERIA UR CULT: NORMAL
OHS QRS DURATION: 102 MS
OHS QTC CALCULATION: 423 MS

## 2025-01-08 ENCOUNTER — PATIENT OUTREACH (OUTPATIENT)
Dept: ADMINISTRATIVE | Facility: CLINIC | Age: 75
End: 2025-01-08
Payer: MEDICARE

## 2025-01-09 ENCOUNTER — OFFICE VISIT (OUTPATIENT)
Dept: INTERNAL MEDICINE | Facility: CLINIC | Age: 75
End: 2025-01-09
Payer: MEDICARE

## 2025-01-09 VITALS
HEIGHT: 62 IN | HEART RATE: 53 BPM | OXYGEN SATURATION: 97 % | BODY MASS INDEX: 54.88 KG/M2 | TEMPERATURE: 98 F | SYSTOLIC BLOOD PRESSURE: 150 MMHG | DIASTOLIC BLOOD PRESSURE: 60 MMHG | RESPIRATION RATE: 16 BRPM

## 2025-01-09 DIAGNOSIS — R41.3 MEMORY CHANGES: ICD-10-CM

## 2025-01-09 DIAGNOSIS — G47.33 OSA (OBSTRUCTIVE SLEEP APNEA): ICD-10-CM

## 2025-01-09 DIAGNOSIS — E78.49 OTHER HYPERLIPIDEMIA: ICD-10-CM

## 2025-01-09 DIAGNOSIS — I10 PRIMARY HYPERTENSION: Primary | ICD-10-CM

## 2025-01-09 DIAGNOSIS — E66.2 MORBID (SEVERE) OBESITY WITH ALVEOLAR HYPOVENTILATION: ICD-10-CM

## 2025-01-09 DIAGNOSIS — I27.20 PULMONARY HYPERTENSION: ICD-10-CM

## 2025-01-09 DIAGNOSIS — K21.9 GASTROESOPHAGEAL REFLUX DISEASE, UNSPECIFIED WHETHER ESOPHAGITIS PRESENT: ICD-10-CM

## 2025-01-09 DIAGNOSIS — G60.9 IDIOPATHIC PERIPHERAL NEUROPATHY: ICD-10-CM

## 2025-01-09 PROCEDURE — 99999 PR PBB SHADOW E&M-EST. PATIENT-LVL III: CPT | Mod: PBBFAC,,, | Performed by: INTERNAL MEDICINE

## 2025-01-09 RX ORDER — AMLODIPINE BESYLATE 2.5 MG/1
2.5 TABLET ORAL DAILY
Qty: 30 TABLET | Refills: 0 | Status: SHIPPED | OUTPATIENT
Start: 2025-01-09

## 2025-01-09 NOTE — PROGRESS NOTES
Patient ID: Ni Montejo is a 74 y.o. female.    Chief Complaint: Establish Care and Hospital Follow Up    History of Present Illness    CHIEF COMPLAINT:  Ni presents today for follow-up after recent hospital discharge.    RECENT HOSPITALIZATION:  She was recently hospitalized where her blood pressure medication was doubled and she was started on atorvastatin 20 mg daily. Her potassium level was low at 2.8 during hospitalization but has normalized to 4.6 with replacement.    CURRENT SYMPTOMS:  She had right-sided chest tightness with movement which brought her to the ED. It was alleviated by belching. ACS was r/o. She reports recurrence of low back pain since hospital discharge, localized to the lower back without radiation to legs.    GI HISTORY:  She has experienced approximately five episodes throughout her adult life of abdominal pain accompanied by cold sweats, nausea, vomiting, and diarrhea. The most recent episode occurred less than a month ago. She has previously consulted with a gastroenterologist for these symptoms. She continues omeprazole 40 mg daily for reflux.    SLEEP:  She uses CPAP nightly with consistent compliance, except during recent hospitalization.    FAMILY HISTORY:  She has a family history of kidney problems.         Physical Exam    Vitals: Blood pressure is 152/80.  General: No acute distress. Well-developed. Well-nourished.  Eyes: EOMI. Sclerae anicteric.  HENT: Normocephalic. Atraumatic. Nares patent. Moist oral mucosa.  Ears: Bilateral TMs clear. Bilateral EACs clear.  Cardiovascular: Regular rate. Regular rhythm. No murmurs. No rubs. No gallops. Normal S1, S2.  Respiratory: Normal respiratory effort. Clear to auscultation bilaterally. No rales. No rhonchi. No wheezing.  Abdomen: Soft. Non-tender. Non-distended. Normoactive bowel sounds.  Musculoskeletal: No  obvious deformity.  Extremities: No lower extremity edema.  Neurological: Alert & oriented x3. No slurred speech. Normal  gait.  Psychiatric: Normal mood. Normal affect. Good insight. Good judgment.  Skin: Warm. Dry. No rash.         Assessment & Plan    IMPRESSION:  - Adjusted antihypertensive regimen by adding amlodipine 2.5mg to current hydrochlorothiazide regimen for persistently elevated blood pressure  - Considered recent hypokalemia, now resolved; will monitor potassium levels with medication changes  - Continued management of pulmonary hypertension  - Noted intermittent GI symptoms; will consider gastroenterology referral if frequency increases  - Addressed mild sinus bradycardia; awaiting nuclear stress test results for further evaluation  - Continued management of chronic conditions including obstructive sleep apnea, hyperlipidemia, and GERD    HYPERTENSION:  - Monitored the patient's blood pressure, which was elevated at 152/80 during hospital stay and currently at 150/60.  - Evaluated that the systolic pressure remains high while the diastolic has decreased.  - Assessed that blood pressure needs better control to prevent kidney damage.  - Continue hydrochlorothiazide 100-25 mg in the morning.  - Initiated amlodipine 2.5 mg daily in the morning.  - Explained potential for transient dizziness when standing quickly after starting new blood pressure medication.  - Instructed the patient to monitor blood pressure at home, recording readings at various times of day.  - Scheduled follow-up visit with nurse practitioner Joana in 2 weeks for hypertension management and for repeat potassium level.   - Set target blood pressure at less than 130/80.    HYPOKALEMIA:  - resolved with replacement    SLEEP APNEA:  - Confirmed that the patient uses CPAP nightly, except during hospital stay.    HYPERLIPIDEMIA:  - Continued atorvastatin 20 mg daily.  - Planned to repeat lipid panel in 3 months.    ACID REFLUX:  - Continued omeprazole 40 mg daily for reflux management.  - Recommend limiting fruit intake to berries due to acid  reflux.    CARDIAC FUNCTION:  - Monitored echocardiogram results, which showed intermittent diastolic dysfunction with elevated pulmonary artery systolic pressure.  - Noted slightly elevated BNP (brain natriuretic peptide) level at 112.  - Evaluated that there are no signs of heart failure on current exam, and the patient is euvolemic.  - Monitored echocardiogram results, which showed elevated pulmonary artery systolic pressure of 53 mmHg.    BACK PAIN:  - Monitored patient's report of lower back pain that started during hospital stay, resolved, and then recurred.  - Evaluated that the back pain is not radiating to legs and there's no bladder or bowel incontinence or groin numbness.    PROTEINURIA:  - Monitored urinalysis results, which showed 2+ protein, indicating proteinuria.  - Assessed that proteinuria is likely due to hypertension.    HYDRATION:  - Recommend increasing water intake, especially before blood draws.  - Explained importance of proper hydration before blood draws to facilitate easier venous access.    FOLLOW UP:  - Follow up in 6 months.  - Contact office for medication refills when 1 week of medication remains.       This note was generated with the assistance of ambient listening technology. Verbal consent was obtained by the patient and accompanying visitor(s) for the recording of patient appointment to facilitate this note. I attest to having reviewed and edited the generated note for accuracy, though some syntax or spelling errors may persist. Please contact the author of this note for any clarification.

## 2025-01-15 DIAGNOSIS — Z78.0 MENOPAUSE: ICD-10-CM

## 2025-01-17 ENCOUNTER — TELEPHONE (OUTPATIENT)
Dept: INTERNAL MEDICINE | Facility: CLINIC | Age: 75
End: 2025-01-17
Payer: MEDICARE

## 2025-01-17 DIAGNOSIS — E78.49 OTHER HYPERLIPIDEMIA: ICD-10-CM

## 2025-01-17 DIAGNOSIS — R68.83 CHILLS: Primary | ICD-10-CM

## 2025-01-17 DIAGNOSIS — R79.9 ABNORMAL FINDING OF BLOOD CHEMISTRY, UNSPECIFIED: ICD-10-CM

## 2025-01-17 DIAGNOSIS — G60.9 IDIOPATHIC PERIPHERAL NEUROPATHY: ICD-10-CM

## 2025-01-27 ENCOUNTER — HOSPITAL ENCOUNTER (OUTPATIENT)
Dept: RADIOLOGY | Facility: HOSPITAL | Age: 75
Discharge: HOME | End: 2025-01-27
Attending: NURSE PRACTITIONER
Payer: MEDICARE

## 2025-01-27 ENCOUNTER — HOSPITAL ENCOUNTER (OUTPATIENT)
Dept: CARDIOLOGY | Facility: HOSPITAL | Age: 75
Discharge: HOME | End: 2025-01-27
Attending: NURSE PRACTITIONER
Payer: MEDICARE

## 2025-01-27 DIAGNOSIS — R07.9 CHEST PAIN: ICD-10-CM

## 2025-01-27 LAB
CV STRESS BASE HR: 53 BPM
DIASTOLIC BLOOD PRESSURE: 75 MMHG
NUC REST EJECTION FRACTION: 60
OHS CV CPX 85 PERCENT MAX PREDICTED HEART RATE MALE: 126
OHS CV CPX MAX PREDICTED HEART RATE: 148
OHS CV CPX PATIENT IS FEMALE: 1
OHS CV CPX PATIENT IS MALE: 0
OHS CV CPX PEAK DIASTOLIC BLOOD PRESSURE: 66 MMHG
OHS CV CPX PEAK HEAR RATE: 67 BPM
OHS CV CPX PEAK RATE PRESSURE PRODUCT: 7906
OHS CV CPX PEAK SYSTOLIC BLOOD PRESSURE: 118 MMHG
OHS CV CPX PERCENT MAX PREDICTED HEART RATE ACHIEVED: 47
OHS CV CPX RATE PRESSURE PRODUCT PRESENTING: 7897
OHS CV INITIAL DOSE: 10 MCG/KG/MIN
OHS CV PEAK DOSE: 30.4 MCG/KG/MIN
SYSTOLIC BLOOD PRESSURE: 149 MMHG

## 2025-01-27 PROCEDURE — A9502 TC99M TETROFOSMIN: HCPCS | Performed by: NURSE PRACTITIONER

## 2025-01-27 PROCEDURE — 93016 CV STRESS TEST SUPVJ ONLY: CPT | Mod: ,,, | Performed by: INTERNAL MEDICINE

## 2025-01-27 PROCEDURE — 93018 CV STRESS TEST I&R ONLY: CPT | Mod: ,,, | Performed by: INTERNAL MEDICINE

## 2025-01-27 PROCEDURE — 78452 HT MUSCLE IMAGE SPECT MULT: CPT | Mod: 26,,, | Performed by: INTERNAL MEDICINE

## 2025-01-27 PROCEDURE — 63600175 PHARM REV CODE 636 W HCPCS: Performed by: INTERNAL MEDICINE

## 2025-01-27 PROCEDURE — 78452 HT MUSCLE IMAGE SPECT MULT: CPT

## 2025-01-27 PROCEDURE — 93017 CV STRESS TEST TRACING ONLY: CPT

## 2025-01-27 RX ORDER — REGADENOSON 0.08 MG/ML
0.4 INJECTION, SOLUTION INTRAVENOUS ONCE
Status: COMPLETED | OUTPATIENT
Start: 2025-01-27 | End: 2025-01-27

## 2025-01-27 RX ADMIN — REGADENOSON 0.4 MG: 0.08 INJECTION, SOLUTION INTRAVENOUS at 08:01

## 2025-01-27 RX ADMIN — TETROFOSMIN 10 MILLICURIE: 1.38 INJECTION, POWDER, LYOPHILIZED, FOR SOLUTION INTRAVENOUS at 07:01

## 2025-01-27 RX ADMIN — TETROFOSMIN 30.4 MILLICURIE: 1.38 INJECTION, POWDER, LYOPHILIZED, FOR SOLUTION INTRAVENOUS at 08:01

## 2025-01-28 ENCOUNTER — OFFICE VISIT (OUTPATIENT)
Dept: CARDIOLOGY | Facility: CLINIC | Age: 75
End: 2025-01-28
Payer: MEDICARE

## 2025-01-28 VITALS
BODY MASS INDEX: 53.87 KG/M2 | HEART RATE: 62 BPM | RESPIRATION RATE: 15 BRPM | WEIGHT: 292.75 LBS | SYSTOLIC BLOOD PRESSURE: 113 MMHG | DIASTOLIC BLOOD PRESSURE: 64 MMHG | OXYGEN SATURATION: 96 % | HEIGHT: 62 IN

## 2025-01-28 DIAGNOSIS — I10 PRIMARY HYPERTENSION: ICD-10-CM

## 2025-01-28 DIAGNOSIS — R07.9 CHEST PAIN: Primary | ICD-10-CM

## 2025-01-28 PROCEDURE — 99999 PR PBB SHADOW E&M-EST. PATIENT-LVL IV: CPT | Mod: PBBFAC,,, | Performed by: INTERNAL MEDICINE

## 2025-01-28 RX ORDER — LOSARTAN POTASSIUM AND HYDROCHLOROTHIAZIDE 12.5; 5 MG/1; MG/1
2 TABLET ORAL DAILY
Qty: 180 TABLET | Refills: 3 | Status: SHIPPED | OUTPATIENT
Start: 2025-01-28 | End: 2026-01-28

## 2025-01-28 RX ORDER — FOLIC ACID/VIT B COMPLEX AND C 0.8 MG
TABLET ORAL
COMMUNITY

## 2025-01-28 RX ORDER — AMLODIPINE BESYLATE 2.5 MG/1
2.5 TABLET ORAL DAILY
Qty: 30 TABLET | Refills: 0 | Status: SHIPPED | OUTPATIENT
Start: 2025-01-28

## 2025-01-28 RX ORDER — DIPHENHYDRAMINE HCL 50 MG
50 CAPSULE ORAL ONCE
Status: SHIPPED | OUTPATIENT
Start: 2025-01-28

## 2025-01-28 RX ORDER — SODIUM CHLORIDE 0.9 % (FLUSH) 0.9 %
10 SYRINGE (ML) INJECTION
Status: SHIPPED | OUTPATIENT
Start: 2025-01-28

## 2025-01-28 RX ORDER — MV/FA/DHA/EPA/FISH OIL/SAW/GNK 400MCG-200
1 COMBINATION PACKAGE (EA) ORAL DAILY
COMMUNITY
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
## 2025-01-29 ENCOUNTER — TELEPHONE (OUTPATIENT)
Dept: INTERNAL MEDICINE | Facility: CLINIC | Age: 75
End: 2025-01-29
Payer: MEDICARE

## 2025-01-29 NOTE — TELEPHONE ENCOUNTER
----- Message from Ana Paula sent at 1/27/2025  4:05 PM CST -----  Contact: Roshni/[facility]/[phone]  Roshni with [facility] called, stated that they got a results, if nurse can call her back. Roshni/[facility]/[phone].

## 2025-02-03 ENCOUNTER — HOSPITAL ENCOUNTER (OUTPATIENT)
Dept: PREADMISSION TESTING | Facility: HOSPITAL | Age: 75
Discharge: HOME | End: 2025-02-03
Attending: INTERNAL MEDICINE
Payer: MEDICARE

## 2025-02-03 VITALS
BODY MASS INDEX: 53.87 KG/M2 | OXYGEN SATURATION: 95 % | RESPIRATION RATE: 18 BRPM | SYSTOLIC BLOOD PRESSURE: 141 MMHG | HEIGHT: 62 IN | WEIGHT: 292.75 LBS | HEART RATE: 57 BPM | DIASTOLIC BLOOD PRESSURE: 56 MMHG | TEMPERATURE: 98 F

## 2025-02-03 DIAGNOSIS — Z01.818 PRE-OP TESTING: Primary | ICD-10-CM

## 2025-02-03 LAB
ALBUMIN SERPL BCP-MCNC: 3.6 G/DL (ref 3.5–5.2)
ALP SERPL-CCNC: 99 U/L (ref 40–150)
ALT SERPL W/O P-5'-P-CCNC: 15 U/L (ref 10–44)
ANION GAP SERPL CALC-SCNC: 10 MMOL/L (ref 8–16)
AST SERPL-CCNC: 21 U/L (ref 10–40)
BASOPHILS # BLD AUTO: 0.06 K/UL (ref 0–0.2)
BASOPHILS NFR BLD: 0.7 % (ref 0–1.9)
BILIRUB SERPL-MCNC: 0.5 MG/DL (ref 0.1–1)
BUN SERPL-MCNC: 17 MG/DL (ref 8–23)
CALCIUM SERPL-MCNC: 8.7 MG/DL (ref 8.7–10.5)
CHLORIDE SERPL-SCNC: 103 MMOL/L (ref 95–110)
CO2 SERPL-SCNC: 31 MMOL/L (ref 23–29)
CREAT SERPL-MCNC: 1 MG/DL (ref 0.5–1.4)
DIFFERENTIAL METHOD BLD: ABNORMAL
EOSINOPHIL # BLD AUTO: 0.1 K/UL (ref 0–0.5)
EOSINOPHIL NFR BLD: 1.3 % (ref 0–8)
ERYTHROCYTE [DISTWIDTH] IN BLOOD BY AUTOMATED COUNT: 14.9 % (ref 11.5–14.5)
EST. GFR  (NO RACE VARIABLE): 60 ML/MIN/1.73 M^2
GLUCOSE SERPL-MCNC: 100 MG/DL (ref 70–110)
HCT VFR BLD AUTO: 41.9 % (ref 37–48.5)
HGB BLD-MCNC: 13.7 G/DL (ref 12–16)
IMM GRANULOCYTES # BLD AUTO: 0.02 K/UL (ref 0–0.04)
IMM GRANULOCYTES NFR BLD AUTO: 0.2 % (ref 0–0.5)
LYMPHOCYTES # BLD AUTO: 3.8 K/UL (ref 1–4.8)
LYMPHOCYTES NFR BLD: 43.9 % (ref 18–48)
MCH RBC QN AUTO: 28.4 PG (ref 27–31)
MCHC RBC AUTO-ENTMCNC: 32.7 G/DL (ref 32–36)
MCV RBC AUTO: 87 FL (ref 82–98)
MONOCYTES # BLD AUTO: 0.6 K/UL (ref 0.3–1)
MONOCYTES NFR BLD: 6.4 % (ref 4–15)
NEUTROPHILS # BLD AUTO: 4.1 K/UL (ref 1.8–7.7)
NEUTROPHILS NFR BLD: 47.5 % (ref 38–73)
NRBC BLD-RTO: 0 /100 WBC
PLATELET # BLD AUTO: 292 K/UL (ref 150–450)
PMV BLD AUTO: 10.8 FL (ref 9.2–12.9)
POTASSIUM SERPL-SCNC: 3.4 MMOL/L (ref 3.5–5.1)
PROT SERPL-MCNC: 7.7 G/DL (ref 6–8.4)
RBC # BLD AUTO: 4.83 M/UL (ref 4–5.4)
SODIUM SERPL-SCNC: 144 MMOL/L (ref 136–145)
WBC # BLD AUTO: 8.66 K/UL (ref 3.9–12.7)

## 2025-02-03 PROCEDURE — 80053 COMPREHEN METABOLIC PANEL: CPT | Performed by: INTERNAL MEDICINE

## 2025-02-03 PROCEDURE — 85025 COMPLETE CBC W/AUTO DIFF WBC: CPT | Performed by: INTERNAL MEDICINE

## 2025-02-03 NOTE — DISCHARGE INSTRUCTIONS
YOUR PROCEDURE WILL BE AT [facility] at [address] [place] [address]                             Enter through the Main Entrance facing [address].                      Report to the Same Day Surgery Registration Desk in the hallway.(Just beside the Same Day Surgery Unit)      Your procedure  is scheduled for ___2/6/2025______________.    Call [phone] between 2pm and 5pm on ___2/5/2025____to find out your arrival time for the day of surgery.    You may have two visitors.  No children under 12 years old.      You will be going to the Same Day Surgery Unit on the 2nd floor of the hospital.    Important instructions:  Do not eat anything after midnight.  You may have plain water, non carbonated.  You may also have Gatorade or Powerade after midnight.    Stop all fluids 2 hours before your surgery.    It is okay to brush your teeth.  Do not have gum, candy or mints.    Do not take any diabetic medication on the morning of surgery unless instructed to do so by your doctor or pre op nurse.        Metformin, Invokamet and Synjardy must be stopped two days before your procedure.  Do not take on the day of procedure.  Resume when instructed.    Please shower the night before and the morning of your surgery.        Use Chlorhexidine soap as instructed by your pre op nurse.   Please place clean linens on your bed the night before surgery. Please wear fresh clean clothing after each shower.    No shaving of procedural area at least 4-5 days before surgery due to increased risk of skin irritation and/or possible infection.    Contact lenses and removable denture work may not be worn during your procedure.    You may wear deodorant only.     Do not wear powder, body lotion, perfume/cologne or make-up.    Do not wear any jewelry or have any metal on your body.    You will be asked to remove any dentures or partials for the procedure.    If you are going home on the same day  of surgery, you must arrange for a family member or a friend to drive you home.  Public transportation is prohibited.  You will not be able to drive home if you were given anesthesia or sedation.    Please leave money and valuables home.      You may bring your cell phone.    Call the doctor if fever or illness should occur before your surgery.    Call [phone] to contact us here if needed.

## 2025-02-06 ENCOUNTER — HOSPITAL ENCOUNTER (OUTPATIENT)
Facility: HOSPITAL | Age: 75
Discharge: HOME | End: 2025-02-06
Attending: INTERNAL MEDICINE | Admitting: INTERNAL MEDICINE
Payer: MEDICARE

## 2025-02-06 VITALS
HEART RATE: 60 BPM | BODY MASS INDEX: 53.43 KG/M2 | RESPIRATION RATE: 20 BRPM | OXYGEN SATURATION: 94 % | TEMPERATURE: 98 F | WEIGHT: 292.13 LBS | DIASTOLIC BLOOD PRESSURE: 67 MMHG | SYSTOLIC BLOOD PRESSURE: 148 MMHG

## 2025-02-06 DIAGNOSIS — R07.9 CHEST PAIN: Primary | ICD-10-CM

## 2025-02-06 PROCEDURE — C1894 INTRO/SHEATH, NON-LASER: HCPCS | Performed by: INTERNAL MEDICINE

## 2025-02-06 PROCEDURE — 25500020 PHARM REV CODE 255: Performed by: INTERNAL MEDICINE

## 2025-02-06 PROCEDURE — C1887 CATHETER, GUIDING: HCPCS | Performed by: INTERNAL MEDICINE

## 2025-02-06 PROCEDURE — 93458 L HRT ARTERY/VENTRICLE ANGIO: CPT | Performed by: INTERNAL MEDICINE

## 2025-02-06 PROCEDURE — C1769 GUIDE WIRE: HCPCS | Performed by: INTERNAL MEDICINE

## 2025-02-06 PROCEDURE — 63600175 PHARM REV CODE 636 W HCPCS: Performed by: INTERNAL MEDICINE

## 2025-02-06 PROCEDURE — 25000003 PHARM REV CODE 250: Performed by: INTERNAL MEDICINE

## 2025-02-06 RX ORDER — ACETAMINOPHEN 325 MG/1
650 TABLET ORAL EVERY 4 HOURS PRN
Status: DISCONTINUED | OUTPATIENT
Start: 2025-02-06 | End: 2025-02-06 | Stop reason: HOSPADM

## 2025-02-06 RX ORDER — METHYLPREDNISOLONE SOD SUCC 125 MG
VIAL (EA) INJECTION
Status: DISCONTINUED | OUTPATIENT
Start: 2025-02-06 | End: 2025-02-06 | Stop reason: HOSPADM

## 2025-02-06 RX ORDER — SIMETHICONE 80 MG
1 TABLET,CHEWABLE ORAL ONCE
Status: DISCONTINUED | OUTPATIENT
Start: 2025-02-06 | End: 2025-02-06 | Stop reason: HOSPADM

## 2025-02-06 RX ORDER — SODIUM CHLORIDE 9 MG/ML
INJECTION, SOLUTION INTRAVENOUS CONTINUOUS
Status: ACTIVE | OUTPATIENT
Start: 2025-02-06 | End: 2025-02-06

## 2025-02-06 RX ORDER — SODIUM CHLORIDE 0.9 % (FLUSH) 0.9 %
10 SYRINGE (ML) INJECTION
Status: DISCONTINUED | OUTPATIENT
Start: 2025-02-06 | End: 2025-02-06 | Stop reason: HOSPADM

## 2025-02-06 RX ORDER — MIDAZOLAM HYDROCHLORIDE 1 MG/ML
INJECTION INTRAMUSCULAR; INTRAVENOUS
Status: DISCONTINUED | OUTPATIENT
Start: 2025-02-06 | End: 2025-02-06 | Stop reason: HOSPADM

## 2025-02-06 RX ORDER — DIPHENHYDRAMINE HCL 25 MG
50 CAPSULE ORAL ONCE
Status: DISCONTINUED | OUTPATIENT
Start: 2025-02-06 | End: 2025-02-06 | Stop reason: HOSPADM

## 2025-02-06 RX ORDER — LIDOCAINE HYDROCHLORIDE 10 MG/ML
INJECTION, SOLUTION EPIDURAL; INFILTRATION; INTRACAUDAL; PERINEURAL
Status: DISCONTINUED | OUTPATIENT
Start: 2025-02-06 | End: 2025-02-06 | Stop reason: HOSPADM

## 2025-02-06 RX ORDER — HEPARIN SODIUM 1000 [USP'U]/ML
INJECTION, SOLUTION INTRAVENOUS; SUBCUTANEOUS
Status: DISCONTINUED | OUTPATIENT
Start: 2025-02-06 | End: 2025-02-06 | Stop reason: HOSPADM

## 2025-02-06 RX ORDER — METHYLPREDNISOLONE SOD SUCC 125 MG
125 VIAL (EA) INJECTION EVERY 6 HOURS
Status: DISCONTINUED | OUTPATIENT
Start: 2025-02-06 | End: 2025-02-06

## 2025-02-06 RX ORDER — ONDANSETRON 8 MG/1
8 TABLET, ORALLY DISINTEGRATING ORAL EVERY 8 HOURS PRN
Status: DISCONTINUED | OUTPATIENT
Start: 2025-02-06 | End: 2025-02-06 | Stop reason: HOSPADM

## 2025-02-06 RX ORDER — FENTANYL CITRATE 50 UG/ML
INJECTION, SOLUTION INTRAMUSCULAR; INTRAVENOUS
Status: DISCONTINUED | OUTPATIENT
Start: 2025-02-06 | End: 2025-02-06 | Stop reason: HOSPADM

## 2025-02-06 RX ADMIN — SODIUM CHLORIDE: 9 INJECTION, SOLUTION INTRAVENOUS at 10:02

## 2025-02-06 NOTE — Clinical Note
The catheter was inserted into the and was inserted over the wire into the ostium   left main. An angiography was performed of the left coronary arteries. Multiple views were taken. The angiography was performed via power injection.   And removed

## 2025-02-06 NOTE — DISCHARGE INSTRUCTIONS
Limit movement of the wrist.  Do not bend wrist or perform heavy lifting for 24 hours.      NO blood pressure cuff or venipuncture to affected arm for 24 hours.    If bleeding occurs, apply pressure to site for 20 minutes. Apply band aid once bleeding stops.  Call 911 if unable to stop bleeding with pressure.     Keep your follow up appointment.      Do not drive, drink alcohol, or sign legal documents for 24 hours, or if taking narcotic pain medication.Limit movement of the wrist.  Do not bend wrist or perform heavy lifting for 24 hours.      NO blood pressure cuff or venipuncture to affected arm for 24 hours.    If bleeding occurs, apply pressure to site for 20 minutes. Apply band aid once bleeding stops.  Call 911 if unable to stop bleeding with pressure.     Keep your follow up appointment.      Do not drive, drink alcohol, or sign legal documents for 24 hours, or if taking narcotic pain medication.Limit movement of the wrist.  Do not bend wrist or perform heavy lifting for 24 hours.      NO blood pressure cuff or venipuncture to affected arm for 24 hours.    If bleeding occurs, apply pressure to site for 20 minutes. Apply band aid once bleeding stops.  Call 911 if unable to stop bleeding with pressure.     Keep your follow up appointment.      Do not drive, drink alcohol, or sign legal documents for 24 hours, or if taking narcotic pain medication.Drink plenty of fluids for the next 48 hours and follow your doctor's diet orders.    Rest for the next 72 hours.     If your puncture site is on the groin, do not keep the injected leg bent for a long period of time.    Remove the dressing in 24 hours, and you may shower. Clean the area with soap and water; apply an adhesive bandage over the puncture site for the  next 5 days.    No Lifting over 5-10 lbs., that is, not more than 1 gallon of water, or straining for 72 hours.    No driving, no drinking alcohol, and no signing legal documents for the next 24  hours.    Call your doctor for elevated temperature, shortness of breath, chest pain, or cold discolored arm/leg.     If oozing occurs at the injections site, lie down. Apply pressure with a clean wash cloth for 20 to 30 minutes and call  your doctor.    If severe bleeding occurs, lie down, apply pressure. Call 911 and request an ambulance to take you to the nearest  hospital emergency room.    Continue to take your regular medications as instructed.    Follow the instructions in the handout given to you.Drink plenty of fluids for the next 48 hours and follow your doctor's diet orders.    Rest for the next 72 hours.     If your puncture site is on the groin, do not keep the injected leg bent for a long period of time.    Remove the dressing in 24 hours, and you may shower. Clean the area with soap and water; apply an adhesive bandage over the puncture site for the  next 5 days.    No Lifting over 5-10 lbs., that is, not more than 1 gallon of water, or straining for 72 hours.    No driving, no drinking alcohol, and no signing legal documents for the next 24 hours.    Call your doctor for elevated temperature, shortness of breath, chest pain, or cold discolored arm/leg.     If oozing occurs at the injections site, lie down. Apply pressure with a clean wash cloth for 20 to 30 minutes and call  your doctor.    If severe bleeding occurs, lie down, apply pressure. Call 911 and request an ambulance to take you to the nearest  hospital emergency room.    Continue to take your regular medications as instructed.    Follow the instructions in the handout given to you.        Fall Prevention  Millions of people fall every year and injure themselves. You may have had anesthesia or sedation which may increase your risk of falling. You may have health issues that put you at an increased risk of falling.     Here are ways to reduce your risk of falling.    Make your home safe by keeping walkways clear of objects you may trip  over.  Use non-slip pads under rugs. Do not use area rugs or small throw rugs.  Use non-slip mats in bathtubs and showers.  Install handrails and lights on staircases.  Do not walk in poorly lit areas.  Do not stand on chairs or wobbly ladders.  Use caution when reaching overhead or looking upward. This position can cause a loss of balance.  Be sure your shoes fit properly, have non-slip bottoms and are in good condition.   Wear shoes both inside and out. Avoid going barefoot or wearing slippers.  Be cautious when going up and down stairs, curbs, and when walking on uneven sidewalks.  If your balance is poor, consider using a cane or walker.  If your fall was related to alcohol use, stop or limit alcohol intake.   If your fall was related to use of sleeping medicines, talk to your doctor about this. You may need to reduce your dosage at bedtime if you awaken during the night to go to the bathroom.    To reduce the need for nighttime bathroom trips:  Avoid drinking fluids for several hours before going to bed  Empty your bladder before going to bed  Men can keep a urinal at the bedside  Stay as active as you can. Balance, flexibility, strength, and endurance all come from exercise. They all play a role in preventing falls. Ask your healthcare provider which types of activity are right for you.  Get your vision checked on a regular basis.  If you have pets, know where they are before you stand up or walk so you don't trip over them.  Use night lights.

## 2025-02-06 NOTE — Clinical Note
The catheter was inserted into the, was repositioned into the and was inserted over the wire into the ostium   right coronary artery. An angiography was performed of the right coronary arteries. Multiple views were taken. The angiography was performed via power injection.   And removed

## 2025-02-06 NOTE — INTERVAL H&P NOTE
The patient has been examined and the H&P has been reviewed:        Procedure risks, benefits and alternative options discussed and understood by patient/family.          There are no hospital problems to display for this patient.

## 2025-02-21 ENCOUNTER — OFFICE VISIT (OUTPATIENT)
Dept: CARDIOLOGY | Facility: CLINIC | Age: 75
End: 2025-02-21
Payer: MEDICARE

## 2025-02-21 VITALS
HEIGHT: 62 IN | RESPIRATION RATE: 15 BRPM | BODY MASS INDEX: 53.73 KG/M2 | WEIGHT: 292 LBS | HEART RATE: 72 BPM | DIASTOLIC BLOOD PRESSURE: 58 MMHG | SYSTOLIC BLOOD PRESSURE: 112 MMHG | OXYGEN SATURATION: 92 %

## 2025-02-21 DIAGNOSIS — I10 PRIMARY HYPERTENSION: ICD-10-CM

## 2025-02-21 RX ORDER — AMLODIPINE BESYLATE 2.5 MG/1
2.5 TABLET ORAL DAILY
Qty: 30 TABLET | Refills: 3 | Status: SHIPPED | OUTPATIENT
Start: 2025-02-21

## 2025-02-21 RX ORDER — LOSARTAN POTASSIUM AND HYDROCHLOROTHIAZIDE 25; 100 MG/1; MG/1
1 TABLET ORAL DAILY
Qty: 90 TABLET | Refills: 3 | Status: SHIPPED | OUTPATIENT
Start: 2025-02-21
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
## 2025-02-22 DIAGNOSIS — Z00.00 ENCOUNTER FOR MEDICARE ANNUAL WELLNESS EXAM: ICD-10-CM

## 2025-03-04 DIAGNOSIS — J30.2 SEASONAL ALLERGIES: ICD-10-CM

## 2025-03-05 NOTE — TELEPHONE ENCOUNTER
Refill Routing Note   Medication(s) are not appropriate for processing by [facility] for the following reason(s):        Outside of protocol    [facility] action(s):  Route               Appointments  past 12m or future 3m with PCP    Date Provider   Last Visit   7/25/2024 Shirley Carranza MD   Next Visit   Visit date not found Shirley Carranza MD   ED visits in past 90 days: 0        Note composed:11:37 AM 03/05/2025

## 2025-03-06 RX ORDER — HYDROXYZINE HYDROCHLORIDE 25 MG/1
TABLET, FILM COATED ORAL
Qty: 270 TABLET | Refills: 0 | Status: SHIPPED | OUTPATIENT
Start: 2025-03-06

## 2025-03-26 ENCOUNTER — TELEPHONE (OUTPATIENT)
Dept: CARDIOLOGY | Facility: CLINIC | Age: 75
End: 2025-03-26
Payer: MEDICARE

## 2025-03-26 ENCOUNTER — HOSPITAL ENCOUNTER (EMERGENCY)
Facility: HOSPITAL | Age: 75
Discharge: HOME | End: 2025-03-26
Attending: EMERGENCY MEDICINE
Payer: MEDICARE

## 2025-03-26 VITALS
TEMPERATURE: 98 F | HEART RATE: 56 BPM | SYSTOLIC BLOOD PRESSURE: 133 MMHG | OXYGEN SATURATION: 97 % | RESPIRATION RATE: 18 BRPM | DIASTOLIC BLOOD PRESSURE: 70 MMHG

## 2025-03-26 DIAGNOSIS — R07.89 CHEST WALL PAIN: Primary | ICD-10-CM

## 2025-03-26 DIAGNOSIS — M25.512 ACUTE PAIN OF LEFT SHOULDER: ICD-10-CM

## 2025-03-26 LAB
ABSOLUTE EOSINOPHIL (OHS): 0.13 K/UL
ABSOLUTE MONOCYTE (OHS): 0.58 K/UL (ref 0.3–1)
ABSOLUTE NEUTROPHIL COUNT (OHS): 4.18 K/UL (ref 1.8–7.7)
ALBUMIN SERPL BCP-MCNC: 3.5 G/DL (ref 3.5–5.2)
ALP SERPL-CCNC: 106 UNIT/L (ref 40–150)
ALT SERPL W/O P-5'-P-CCNC: 15 UNIT/L (ref 10–44)
ANION GAP (OHS): 10 MMOL/L (ref 8–16)
AST SERPL-CCNC: 17 UNIT/L (ref 11–45)
BASOPHILS # BLD AUTO: 0.06 K/UL
BASOPHILS NFR BLD AUTO: 0.7 %
BILIRUB SERPL-MCNC: 0.4 MG/DL (ref 0.1–1)
BNP SERPL-MCNC: 17 PG/ML (ref 0–99)
BUN SERPL-MCNC: 14 MG/DL (ref 8–23)
CALCIUM SERPL-MCNC: 8.4 MG/DL (ref 8.7–10.5)
CHLORIDE SERPL-SCNC: 105 MMOL/L (ref 95–110)
CO2 SERPL-SCNC: 27 MMOL/L (ref 23–29)
CREAT SERPL-MCNC: 0.9 MG/DL (ref 0.5–1.4)
ERYTHROCYTE [DISTWIDTH] IN BLOOD BY AUTOMATED COUNT: 15 % (ref 11.5–14.5)
GFR SERPLBLD CREATININE-BSD FMLA CKD-EPI: >60 ML/MIN/1.73/M2
GLUCOSE SERPL-MCNC: 108 MG/DL (ref 70–110)
HCT VFR BLD AUTO: 39.4 % (ref 37–48.5)
HGB BLD-MCNC: 13.1 GM/DL (ref 12–16)
IMM GRANULOCYTES # BLD AUTO: 0.02 K/UL (ref 0–0.04)
IMM GRANULOCYTES NFR BLD AUTO: 0.2 % (ref 0–0.5)
LYMPHOCYTES # BLD AUTO: 3.39 K/UL (ref 1–4.8)
MCH RBC QN AUTO: 28.8 PG (ref 27–50)
MCHC RBC AUTO-ENTMCNC: 33.2 G/DL (ref 32–36)
MCV RBC AUTO: 87 FL (ref 82–98)
NUCLEATED RBC (/100WBC) (OHS): 0 /100 WBC
OHS QRS DURATION: 104 MS
OHS QTC CALCULATION: 461 MS
PLATELET # BLD AUTO: 264 K/UL (ref 150–450)
PMV BLD AUTO: 10.3 FL (ref 9.2–12.9)
POTASSIUM SERPL-SCNC: 3.3 MMOL/L (ref 3.5–5.1)
PROT SERPL-MCNC: 7.9 GM/DL (ref 6–8.4)
RBC # BLD AUTO: 4.55 M/UL (ref 4–5.4)
RELATIVE EOSINOPHIL (OHS): 1.6 %
RELATIVE LYMPHOCYTE (OHS): 40.6 % (ref 18–48)
RELATIVE MONOCYTE (OHS): 6.9 % (ref 4–15)
RELATIVE NEUTROPHIL (OHS): 50 % (ref 38–73)
SODIUM SERPL-SCNC: 142 MMOL/L (ref 136–145)
TROPONIN I SERPL DL<=0.01 NG/ML-MCNC: <0.006 NG/ML
WBC # BLD AUTO: 8.36 K/UL (ref 3.9–12.7)

## 2025-03-26 PROCEDURE — 84484 ASSAY OF TROPONIN QUANT: CPT | Performed by: EMERGENCY MEDICINE

## 2025-03-26 PROCEDURE — 82040 ASSAY OF SERUM ALBUMIN: CPT | Performed by: EMERGENCY MEDICINE

## 2025-03-26 PROCEDURE — 93005 ELECTROCARDIOGRAM TRACING: CPT

## 2025-03-26 PROCEDURE — 25000003 PHARM REV CODE 250: Performed by: EMERGENCY MEDICINE

## 2025-03-26 PROCEDURE — 93010 ELECTROCARDIOGRAM REPORT: CPT | Mod: ,,, | Performed by: INTERNAL MEDICINE

## 2025-03-26 PROCEDURE — 83880 ASSAY OF NATRIURETIC PEPTIDE: CPT | Performed by: EMERGENCY MEDICINE

## 2025-03-26 PROCEDURE — 85025 COMPLETE CBC W/AUTO DIFF WBC: CPT | Performed by: EMERGENCY MEDICINE

## 2025-03-26 PROCEDURE — 99285 EMERGENCY DEPT VISIT HI MDM: CPT | Mod: 25

## 2025-03-26 RX ORDER — IBUPROFEN 400 MG/1
800 TABLET ORAL
Status: COMPLETED | OUTPATIENT
Start: 2025-03-26 | End: 2025-03-26

## 2025-03-26 RX ORDER — METHOCARBAMOL 500 MG/1
1000 TABLET, FILM COATED ORAL 3 TIMES DAILY
Qty: 30 TABLET | Refills: 0 | Status: SHIPPED | OUTPATIENT
Start: 2025-03-26 | End: 2025-03-31

## 2025-03-26 RX ORDER — METHOCARBAMOL 500 MG/1
1000 TABLET, FILM COATED ORAL
Status: COMPLETED | OUTPATIENT
Start: 2025-03-26 | End: 2025-03-26

## 2025-03-26 RX ORDER — ACETAMINOPHEN 325 MG/1
650 TABLET ORAL
Status: COMPLETED | OUTPATIENT
Start: 2025-03-26 | End: 2025-03-26

## 2025-03-26 RX ADMIN — IBUPROFEN 800 MG: 400 TABLET ORAL at 02:03

## 2025-03-26 RX ADMIN — POTASSIUM BICARBONATE 40 MEQ: 391 TABLET, EFFERVESCENT ORAL at 02:03

## 2025-03-26 RX ADMIN — METHOCARBAMOL 1000 MG: 500 TABLET ORAL at 01:03

## 2025-03-26 RX ADMIN — ACETAMINOPHEN 650 MG: 325 TABLET ORAL at 01:03
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
## 2025-03-26 NOTE — TELEPHONE ENCOUNTER
Tried calling the patient but it is going straight to the voicemail.  That is right, if she is having severe chest pain, she needs to go to the ED ASAP

## 2025-03-26 NOTE — TELEPHONE ENCOUNTER
Spoke with patient and she states that she is having severe chest pain on her left side along with pressure and heaviness. Patient denies SOB, and/or tingling or numbness to the left side. Asked patient to rate pain and she says it is an 8-9. Patient advised to go to ER to get evaluated asap. Patient states that she is going. Please advise

## 2025-03-26 NOTE — TELEPHONE ENCOUNTER
----- Message from Ana Maria sent at 3/26/2025  8:13 AM CDT -----  Contact: Patient  Type:  Patient CallWho Called:patient Does the patient know what this is regarding?: Requesting a call back ;pt called in with symptoms of chest pain on the left side ; I completed the symptom screening 7 informed the patient to call 911 or speak to a registered nurse pt said that she would go to the ER  before 12 noon because she had to wait for her ride ; please advise Would the patient rather a call back or a response via [facility]?call Best Call Back Number:[phone] Additional Information:Symptom: Chest Pain - AdultOutcome: Instruct patient to Call 911 NOW!Reason: Severe pain now

## 2025-03-26 NOTE — DISCHARGE INSTRUCTIONS
Follow up with the your primary care provider for re-evaluation within the next 2-3 days.    Recommend Tylenol as needed for pain control.  Can also use the prescribed Robaxin as needed.    Seek medical care if symptoms worsen or any concerns

## 2025-04-06 DIAGNOSIS — K21.9 GASTROESOPHAGEAL REFLUX DISEASE WITHOUT ESOPHAGITIS: ICD-10-CM

## 2025-04-06 NOTE — TELEPHONE ENCOUNTER
No care due was identified.  [facility] Embedded Care Due Messages. Reference number: [id number].   4/06/2025 4:25:54 PM CDT

## 2025-04-07 RX ORDER — OMEPRAZOLE 40 MG/1
40 CAPSULE, DELAYED RELEASE ORAL EVERY MORNING
Qty: 90 CAPSULE | Refills: 3 | Status: SHIPPED | OUTPATIENT
Start: 2025-04-07

## 2025-04-07 NOTE — TELEPHONE ENCOUNTER
Refill Routing Note   Medication(s) are not appropriate for processing by [facility] for the following reason(s):        ED/Hospital Visit since last OV with provider  No active prescription written by provider  Responsible provider unclear    [facility] action(s):  Defer             Appointments  past 12m or future 3m with PCP    Date Provider   Last Visit   1/9/2025 Quincy Rossi, DO   Next Visit   7/10/2025 Quincy Rossi, DO   ED visits in past 90 days: 1        Note composed:10:24 AM 04/07/2025

## 2025-04-11 ENCOUNTER — PATIENT OUTREACH (OUTPATIENT)
Dept: ADMINISTRATIVE | Facility: HOSPITAL | Age: 75
End: 2025-04-11
Payer: MEDICARE

## 2025-05-10 DIAGNOSIS — R05.8 UPPER AIRWAY COUGH SYNDROME: ICD-10-CM

## 2025-05-12 RX ORDER — ALBUTEROL SULFATE 90 UG/1
INHALANT RESPIRATORY (INHALATION)
Qty: 54 G | Refills: 2 | Status: SHIPPED | OUTPATIENT
Start: 2025-05-12

## 2025-05-12 NOTE — TELEPHONE ENCOUNTER
No care due was identified.  [facility] Embedded Care Due Messages. Reference number: [id number].   5/12/2025 10:21:56 AM CDT

## 2025-05-12 NOTE — TELEPHONE ENCOUNTER
Refill Routing Note   Medication(s) are not appropriate for processing by [facility] for the following reason(s):        ED/Hospital Visit since last OV with provider    [facility] action(s):  Defer      Medication Therapy Plan: 3/26/25 ED VISIT - Chest wall pain    Extended chart review required: Yes     Appointments  past 12m or future 3m with PCP    Date Provider   Last Visit   1/9/2025 Quincy Rossi, DO   Next Visit   7/10/2025 Quincy Rossi, DO   ED visits in past 90 days: 1        Note composed:11:48 AM 05/12/2025

## 2025-05-29 ENCOUNTER — PATIENT MESSAGE (OUTPATIENT)
Dept: INTERNAL MEDICINE | Facility: CLINIC | Age: 75
End: 2025-05-29
Payer: MEDICARE

## 2025-06-04 DIAGNOSIS — R10.13 EPIGASTRIC PAIN: ICD-10-CM

## 2025-06-04 RX ORDER — DICYCLOMINE HYDROCHLORIDE 20 MG/1
20 TABLET ORAL 4 TIMES DAILY
Qty: 120 TABLET | Refills: 5 | Status: SHIPPED | OUTPATIENT
Start: 2025-06-04

## 2025-07-09 DIAGNOSIS — I10 PRIMARY HYPERTENSION: ICD-10-CM

## 2025-07-09 NOTE — TELEPHONE ENCOUNTER
No care due was identified.  [facility] Embedded Care Due Messages. Reference number: [id number].   7/09/2025 6:00:56 PM CDT

## 2025-07-10 ENCOUNTER — OFFICE VISIT (OUTPATIENT)
Dept: INTERNAL MEDICINE | Facility: CLINIC | Age: 75
End: 2025-07-10
Payer: MEDICARE

## 2025-07-10 ENCOUNTER — LAB VISIT (OUTPATIENT)
Dept: LAB | Facility: HOSPITAL | Age: 75
End: 2025-07-10
Attending: INTERNAL MEDICINE
Payer: MEDICARE

## 2025-07-10 VITALS
TEMPERATURE: 97 F | HEART RATE: 59 BPM | DIASTOLIC BLOOD PRESSURE: 86 MMHG | RESPIRATION RATE: 19 BRPM | OXYGEN SATURATION: 98 % | HEIGHT: 62 IN | WEIGHT: 293 LBS | BODY MASS INDEX: 53.92 KG/M2 | SYSTOLIC BLOOD PRESSURE: 126 MMHG

## 2025-07-10 DIAGNOSIS — J01.00 ACUTE MAXILLARY SINUSITIS, RECURRENCE NOT SPECIFIED: ICD-10-CM

## 2025-07-10 DIAGNOSIS — R79.0 ABNORMAL LEVEL OF BLOOD MINERAL: ICD-10-CM

## 2025-07-10 DIAGNOSIS — G62.9 NEUROPATHY: ICD-10-CM

## 2025-07-10 DIAGNOSIS — R13.19 ESOPHAGEAL DYSPHAGIA: ICD-10-CM

## 2025-07-10 DIAGNOSIS — R20.2 NUMBNESS AND TINGLING: ICD-10-CM

## 2025-07-10 DIAGNOSIS — R53.83 FATIGUE, UNSPECIFIED TYPE: ICD-10-CM

## 2025-07-10 DIAGNOSIS — I10 PRIMARY HYPERTENSION: ICD-10-CM

## 2025-07-10 DIAGNOSIS — R68.83 CHILLS: ICD-10-CM

## 2025-07-10 DIAGNOSIS — E78.49 OTHER HYPERLIPIDEMIA: ICD-10-CM

## 2025-07-10 DIAGNOSIS — E78.49 OTHER HYPERLIPIDEMIA: Primary | ICD-10-CM

## 2025-07-10 DIAGNOSIS — Z12.31 ENCOUNTER FOR SCREENING MAMMOGRAM FOR BREAST CANCER: ICD-10-CM

## 2025-07-10 DIAGNOSIS — R05.8 UPPER AIRWAY COUGH SYNDROME: ICD-10-CM

## 2025-07-10 DIAGNOSIS — R74.8 ABNORMAL LEVELS OF OTHER SERUM ENZYMES: ICD-10-CM

## 2025-07-10 DIAGNOSIS — E66.2 MORBID (SEVERE) OBESITY WITH ALVEOLAR HYPOVENTILATION: ICD-10-CM

## 2025-07-10 DIAGNOSIS — R11.0 NAUSEA: ICD-10-CM

## 2025-07-10 DIAGNOSIS — R20.0 NUMBNESS AND TINGLING: ICD-10-CM

## 2025-07-10 DIAGNOSIS — K21.9 GASTROESOPHAGEAL REFLUX DISEASE, UNSPECIFIED WHETHER ESOPHAGITIS PRESENT: ICD-10-CM

## 2025-07-10 DIAGNOSIS — G47.33 OSA (OBSTRUCTIVE SLEEP APNEA): ICD-10-CM

## 2025-07-10 DIAGNOSIS — I27.20 PULMONARY HYPERTENSION: ICD-10-CM

## 2025-07-10 DIAGNOSIS — R79.9 ABNORMAL FINDING OF BLOOD CHEMISTRY, UNSPECIFIED: ICD-10-CM

## 2025-07-10 DIAGNOSIS — G60.9 IDIOPATHIC PERIPHERAL NEUROPATHY: ICD-10-CM

## 2025-07-10 LAB
ABSOLUTE EOSINOPHIL (OHS): 0.1 K/UL
ABSOLUTE MONOCYTE (OHS): 0.81 K/UL (ref 0.3–1)
ABSOLUTE NEUTROPHIL COUNT (OHS): 5.22 K/UL (ref 1.8–7.7)
ALBUMIN SERPL BCP-MCNC: 3.7 G/DL (ref 3.5–5.2)
ALP SERPL-CCNC: 105 UNIT/L (ref 40–150)
ALT SERPL W/O P-5'-P-CCNC: 16 UNIT/L (ref 10–44)
ANION GAP (OHS): 14 MMOL/L (ref 8–16)
AST SERPL-CCNC: 20 UNIT/L (ref 11–45)
BASOPHILS # BLD AUTO: 0.06 K/UL
BASOPHILS NFR BLD AUTO: 0.7 %
BILIRUB SERPL-MCNC: 0.4 MG/DL (ref 0.1–1)
BUN SERPL-MCNC: 22 MG/DL (ref 8–23)
CALCIUM SERPL-MCNC: 8.9 MG/DL (ref 8.7–10.5)
CHLORIDE SERPL-SCNC: 104 MMOL/L (ref 95–110)
CO2 SERPL-SCNC: 26 MMOL/L (ref 23–29)
CREAT SERPL-MCNC: 1.1 MG/DL (ref 0.5–1.4)
EAG (OHS): 114 MG/DL (ref 68–131)
ERYTHROCYTE [DISTWIDTH] IN BLOOD BY AUTOMATED COUNT: 15 % (ref 11.5–14.5)
FERRITIN SERPL-MCNC: 163 NG/ML (ref 20–300)
GFR SERPLBLD CREATININE-BSD FMLA CKD-EPI: 53 ML/MIN/1.73/M2
GLUCOSE SERPL-MCNC: 105 MG/DL (ref 70–110)
HBA1C MFR BLD: 5.6 % (ref 4–5.6)
HCT VFR BLD AUTO: 40.5 % (ref 37–48.5)
HGB BLD-MCNC: 12.8 GM/DL (ref 12–16)
IMM GRANULOCYTES # BLD AUTO: 0.01 K/UL (ref 0–0.04)
IMM GRANULOCYTES NFR BLD AUTO: 0.1 % (ref 0–0.5)
IRON SATN MFR SERPL: 17 % (ref 20–50)
IRON SERPL-MCNC: 44 UG/DL (ref 30–160)
LIPASE SERPL-CCNC: 37 U/L (ref 4–60)
LYMPHOCYTES # BLD AUTO: 2.5 K/UL (ref 1–4.8)
MAGNESIUM SERPL-MCNC: 1.8 MG/DL (ref 1.6–2.6)
MCH RBC QN AUTO: 28.1 PG (ref 27–31)
MCHC RBC AUTO-ENTMCNC: 31.6 G/DL (ref 32–36)
MCV RBC AUTO: 89 FL (ref 82–98)
NUCLEATED RBC (/100WBC) (OHS): 0 /100 WBC
PLATELET # BLD AUTO: 267 K/UL (ref 150–450)
PMV BLD AUTO: 11 FL (ref 9.2–12.9)
POTASSIUM SERPL-SCNC: 3.4 MMOL/L (ref 3.5–5.1)
PROT SERPL-MCNC: 7.6 GM/DL (ref 6–8.4)
RBC # BLD AUTO: 4.55 M/UL (ref 4–5.4)
RELATIVE EOSINOPHIL (OHS): 1.1 %
RELATIVE LYMPHOCYTE (OHS): 28.7 % (ref 18–48)
RELATIVE MONOCYTE (OHS): 9.3 % (ref 4–15)
RELATIVE NEUTROPHIL (OHS): 60.1 % (ref 38–73)
RETICS/RBC NFR AUTO: 2.5 % (ref 0.5–2.5)
SODIUM SERPL-SCNC: 144 MMOL/L (ref 136–145)
TIBC SERPL-MCNC: 260 UG/DL (ref 250–450)
TRANSFERRIN SERPL-MCNC: 176 MG/DL (ref 200–375)
VIT B12 SERPL-MCNC: >2000 PG/ML (ref 210–950)
WBC # BLD AUTO: 8.7 K/UL (ref 3.9–12.7)

## 2025-07-10 PROCEDURE — 3044F HG A1C LEVEL LT 7.0%: CPT | Mod: CPTII,S$GLB,, | Performed by: INTERNAL MEDICINE

## 2025-07-10 PROCEDURE — 3288F FALL RISK ASSESSMENT DOCD: CPT | Mod: CPTII,S$GLB,, | Performed by: INTERNAL MEDICINE

## 2025-07-10 PROCEDURE — 3074F SYST BP LT 130 MM HG: CPT | Mod: CPTII,S$GLB,, | Performed by: INTERNAL MEDICINE

## 2025-07-10 PROCEDURE — 83690 ASSAY OF LIPASE: CPT

## 2025-07-10 PROCEDURE — G2211 COMPLEX E/M VISIT ADD ON: HCPCS | Mod: S$GLB,,, | Performed by: INTERNAL MEDICINE

## 2025-07-10 PROCEDURE — 99214 OFFICE O/P EST MOD 30 MIN: CPT | Mod: S$GLB,,, | Performed by: INTERNAL MEDICINE

## 2025-07-10 PROCEDURE — 3008F BODY MASS INDEX DOCD: CPT | Mod: CPTII,S$GLB,, | Performed by: INTERNAL MEDICINE

## 2025-07-10 PROCEDURE — 83735 ASSAY OF MAGNESIUM: CPT

## 2025-07-10 PROCEDURE — 85025 COMPLETE CBC W/AUTO DIFF WBC: CPT

## 2025-07-10 PROCEDURE — 82728 ASSAY OF FERRITIN: CPT

## 2025-07-10 PROCEDURE — 99999 PR PBB SHADOW E&M-EST. PATIENT-LVL V: CPT | Mod: PBBFAC,,, | Performed by: INTERNAL MEDICINE

## 2025-07-10 PROCEDURE — 80053 COMPREHEN METABOLIC PANEL: CPT

## 2025-07-10 PROCEDURE — 84425 ASSAY OF VITAMIN B-1: CPT

## 2025-07-10 PROCEDURE — 82607 VITAMIN B-12: CPT

## 2025-07-10 PROCEDURE — 83036 HEMOGLOBIN GLYCOSYLATED A1C: CPT

## 2025-07-10 PROCEDURE — 85045 AUTOMATED RETICULOCYTE COUNT: CPT

## 2025-07-10 PROCEDURE — 1101F PT FALLS ASSESS-DOCD LE1/YR: CPT | Mod: CPTII,S$GLB,, | Performed by: INTERNAL MEDICINE

## 2025-07-10 PROCEDURE — 36415 COLL VENOUS BLD VENIPUNCTURE: CPT | Mod: PO

## 2025-07-10 PROCEDURE — 84466 ASSAY OF TRANSFERRIN: CPT

## 2025-07-10 PROCEDURE — 3079F DIAST BP 80-89 MM HG: CPT | Mod: CPTII,S$GLB,, | Performed by: INTERNAL MEDICINE

## 2025-07-10 RX ORDER — MAGNESIUM 250 MG
250 TABLET ORAL ONCE
COMMUNITY

## 2025-07-10 RX ORDER — CHOLECALCIFEROL (VITAMIN D3) 25 MCG
1000 TABLET ORAL DAILY
COMMUNITY

## 2025-07-10 RX ORDER — LEVOCETIRIZINE DIHYDROCHLORIDE 5 MG/1
5 TABLET, FILM COATED ORAL NIGHTLY
Qty: 30 TABLET | Refills: 11 | Status: SHIPPED | OUTPATIENT
Start: 2025-07-10 | End: 2026-07-10

## 2025-07-10 RX ORDER — FLUTICASONE PROPIONATE 50 MCG
1 SPRAY, SUSPENSION (ML) NASAL 2 TIMES DAILY
Qty: 48 G | Refills: 1 | Status: SHIPPED | OUTPATIENT
Start: 2025-07-10

## 2025-07-10 RX ORDER — PYRIDOXINE HCL (VITAMIN B6) 250 MG
250 TABLET ORAL DAILY
COMMUNITY

## 2025-07-10 RX ORDER — AZITHROMYCIN 250 MG/1
TABLET, FILM COATED ORAL
Qty: 6 TABLET | Refills: 0 | Status: SHIPPED | OUTPATIENT
Start: 2025-07-10 | End: 2025-07-15

## 2025-07-10 RX ORDER — PARSLEY 450 MG
CAPSULE ORAL
COMMUNITY

## 2025-07-10 NOTE — TELEPHONE ENCOUNTER
Refill Routing Note   Medication(s) are not appropriate for processing by [facility] for the following reason(s):        No active prescription written by provider-prev under cardiology  Pt has OV today with PCP    [facility] action(s):  Defer        Medication Therapy Plan: Pt has OV today with PCP. This med has been under Cardiology in the past      Appointments  past 12m or future 3m with PCP    Date Provider   Last Visit   1/9/2025 Quincy Rossi, DO   Next Visit   7/10/2025 Quincy Rossi, DO   ED visits in past 90 days: 0        Note composed:6:52 AM 07/10/2025

## 2025-07-11 ENCOUNTER — TELEPHONE (OUTPATIENT)
Dept: INTERNAL MEDICINE | Facility: CLINIC | Age: 75
End: 2025-07-11
Payer: MEDICARE

## 2025-07-11 DIAGNOSIS — R29.898 LEG WEAKNESS, BILATERAL: Primary | ICD-10-CM

## 2025-07-11 DIAGNOSIS — E66.01 MORBID OBESITY: ICD-10-CM

## 2025-07-11 DIAGNOSIS — R53.81 PHYSICAL DEBILITY: ICD-10-CM

## 2025-07-11 RX ORDER — AMLODIPINE BESYLATE 2.5 MG/1
2.5 TABLET ORAL
Qty: 90 TABLET | Refills: 3 | Status: SHIPPED | OUTPATIENT
Start: 2025-07-11

## 2025-07-11 NOTE — TELEPHONE ENCOUNTER
PT referral in   She can do the EGD closer but may take longer to get scheduled. She has the number to call and reschedule if she likes.     Lab results are in

## 2025-07-11 NOTE — TELEPHONE ENCOUNTER
Pt has concerns over decreased metabolism bc it is too low. Also she doesn't have any energy and is asking for physical therapy to deal with the lack of energy. Will only ask for PT if insurance could cover it.     Also pt is asking why she has a scheduled endo procedure in [place] as opposed to other closer locations.    Wants to discuss abnormal test results with PCP.

## 2025-07-15 ENCOUNTER — CLINICAL SUPPORT (OUTPATIENT)
Dept: ENDOSCOPY | Facility: HOSPITAL | Age: 75
End: 2025-07-15
Attending: INTERNAL MEDICINE
Payer: MEDICARE

## 2025-07-15 VITALS — HEIGHT: 62 IN | BODY MASS INDEX: 53.92 KG/M2 | WEIGHT: 293 LBS

## 2025-07-15 DIAGNOSIS — R13.19 ESOPHAGEAL DYSPHAGIA: Primary | ICD-10-CM

## 2025-07-15 NOTE — PROGRESS NOTES
Patient is scheduled for a Upper Endoscopy (EGD) on 09/12/2025 with Dr. PEDRO Madrigal  Referral for procedure from [facility] appointment

## 2025-07-15 NOTE — PATIENT INSTRUCTIONS
EGD Procedure Prep Instructions      Date of procedure: 09/12/2025 Arrive at: 12:10PM      Location of Department: [address] [place], [place] [address] [address]  Take the Elevators to 2nd Floor Endoscopy Procedural Area    How to prep:    Day Before Procedure: 09/11/2025     You may have a light evening meal.   No solid food after 7:00 pm.   Continue drinking clear liquids.       Day of the Procedure:  09/12/2025     You may have water/clear liquids until 4 hours before your procedure or as directed by the scheduling nurse  09:10AM. See below for list.    What You CANNOT do:   Do not drink milk or anything colored red.  Do not drink alcohol.  No gum chewing or candy morning of procedure.    Liquids That Are OK to Drink:   Water  Sports drinks (Gatorade, Power-Aid)  Coffee or tea (no cream or nondairy creamer)  Clear juices without pulp (apple, white grape)  Gelatin desserts (no fruit or toppings)  Clear soda (sprite, coke, ginger ale)  Chicken broth (until 12 midnight the night before procedure)      Comments:           IMPORTANT INFORMATION TO KNOW BEFORE YOUR PROCEDURE    [facility]     If your procedure requires the administration of anesthesia, it is necessary for a responsible adult to drive you home. The designated adult is strongly encouraged to remain in the endoscopy area until the patient is discharged. (Medical Transportation, Uber, Lyft, Taxi, etc. may ONLY be used if a responsible adult is present to accompany you home. The responsible adult CANNOT be the  of the service.)     person must be available to return to pick you up within 15 minutes of being notified of discharge.     Please bring a picture ID, insurance card, and copayment.     Take Medications as directed below:      If you begin taking any blood thinning medications, injectable weight loss/diabetes medications (other than insulin) , Adipex (Phentermine) , please contact the endoscopy scheduling  department listed below as soon as possible.    If you are diabetic see the attached instruction sheet regarding your medication.     If you take HEART, BLOOD PRESSURE, SEIZURE, PAIN, LUNG (including inhalers/nebulizers), ANTI-REJECTION (transplant patients), or PSYCHIATRIC medications, please take at your regular times with a sip of water or as directed by the scheduling nurse.     Important contact information:    Endoscopy [phone] Hours of operation Monday-Friday 8:00-4:30pm.    Questions about insurance or financial obligations call [phone] or [phone].    If you have questions regarding the prep or need to reschedule, please call [phone]. After hours questions requiring immediate assistance, contact [facility] nurse line at [phone] or [phone].   NOTE:     On occasion, unforeseen circumstances may cause a delay in your procedure start time. We respect your time and appreciate your patience during these circumstances.      Comments:

## 2025-07-16 LAB — W VITAMIN B1: 86 UG/L

## 2025-07-17 ENCOUNTER — OFFICE VISIT (OUTPATIENT)
Dept: INTERNAL MEDICINE | Facility: CLINIC | Age: 75
End: 2025-07-17
Payer: MEDICARE

## 2025-07-17 ENCOUNTER — HOSPITAL ENCOUNTER (OUTPATIENT)
Dept: RADIOLOGY | Facility: HOSPITAL | Age: 75
Discharge: HOME | End: 2025-07-17
Attending: INTERNAL MEDICINE
Payer: MEDICARE

## 2025-07-17 VITALS
DIASTOLIC BLOOD PRESSURE: 74 MMHG | BODY MASS INDEX: 53.92 KG/M2 | OXYGEN SATURATION: 97 % | WEIGHT: 293 LBS | HEART RATE: 87 BPM | RESPIRATION RATE: 14 BRPM | SYSTOLIC BLOOD PRESSURE: 134 MMHG | HEIGHT: 62 IN | TEMPERATURE: 98 F

## 2025-07-17 DIAGNOSIS — R06.09 DYSPNEA ON EXERTION: ICD-10-CM

## 2025-07-17 DIAGNOSIS — Z12.31 ENCOUNTER FOR SCREENING MAMMOGRAM FOR BREAST CANCER: ICD-10-CM

## 2025-07-17 DIAGNOSIS — E87.6 HYPOKALEMIA: Primary | ICD-10-CM

## 2025-07-17 DIAGNOSIS — E66.01 MORBID OBESITY WITH BMI OF 50.0-59.9, ADULT: ICD-10-CM

## 2025-07-17 PROCEDURE — 3044F HG A1C LEVEL LT 7.0%: CPT | Mod: CPTII,S$GLB,, | Performed by: INTERNAL MEDICINE

## 2025-07-17 PROCEDURE — 77067 SCR MAMMO BI INCL CAD: CPT | Mod: TC,PO

## 2025-07-17 PROCEDURE — 1101F PT FALLS ASSESS-DOCD LE1/YR: CPT | Mod: CPTII,S$GLB,, | Performed by: INTERNAL MEDICINE

## 2025-07-17 PROCEDURE — 3288F FALL RISK ASSESSMENT DOCD: CPT | Mod: CPTII,S$GLB,, | Performed by: INTERNAL MEDICINE

## 2025-07-17 PROCEDURE — 99999 PR PBB SHADOW E&M-EST. PATIENT-LVL V: CPT | Mod: PBBFAC,,, | Performed by: INTERNAL MEDICINE

## 2025-07-17 PROCEDURE — 3075F SYST BP GE 130 - 139MM HG: CPT | Mod: CPTII,S$GLB,, | Performed by: INTERNAL MEDICINE

## 2025-07-17 PROCEDURE — 1159F MED LIST DOCD IN RCRD: CPT | Mod: CPTII,S$GLB,, | Performed by: INTERNAL MEDICINE

## 2025-07-17 PROCEDURE — 3008F BODY MASS INDEX DOCD: CPT | Mod: CPTII,S$GLB,, | Performed by: INTERNAL MEDICINE

## 2025-07-17 PROCEDURE — 99214 OFFICE O/P EST MOD 30 MIN: CPT | Mod: S$GLB,,, | Performed by: INTERNAL MEDICINE

## 2025-07-17 PROCEDURE — 3078F DIAST BP <80 MM HG: CPT | Mod: CPTII,S$GLB,, | Performed by: INTERNAL MEDICINE

## 2025-07-17 RX ORDER — POTASSIUM CHLORIDE 20 MEQ/1
20 TABLET, EXTENDED RELEASE ORAL DAILY
Qty: 90 TABLET | Refills: 3 | Status: SHIPPED | OUTPATIENT
Start: 2025-07-17

## 2025-07-28 NOTE — PROGRESS NOTES
"Patient ID: Ni Montejo is a 74 y.o. female.    Chief Complaint: Follow-up    History of Present Illness    CHIEF COMPLAINT:  Ni presents today for follow up of multiple concerns including abnormal lab results and chest pain.    CHEST PAIN:  She experienced chest pain this morning associated with regular breathing, lasting for 4-5 breaths. The pain was not deep but painful with each breath, resolved spontaneously, and cannot be reproduced.    POST-COVID SYMPTOMS:  She reports significant post-COVID symptoms including fatigue and exercise intolerance. She describes complete lack of energy since tc coronavirus and experiences shortness of breath with minimal walking, feeling winded and exhausted after short distances.    LABORATORY ABNORMALITIES:  Her potassium level is low at 3.4 despite consuming two bananas daily. GFR is low at 53. Iron studies were noted to be low, though she is not anemic.    NEUROLOGICAL:  She reports ongoing hand numbness, with an episode lasting approximately five minutes last night. Attempts to alleviate symptoms by moving hands were unsuccessful. She characterizes current symptoms as "mild" and has a pending neurology appointment in September.    RESPIRATORY:  She reports persistent white nasal mucus and experienced significant coughing yesterday. Prescribed medication has been helpful but symptoms persist.    GASTROINTESTINAL:  She reports chronic constipation with significant difficulty passing stool. When medication is missed, bowel movements require approximately 30 minutes with incomplete evacuation, necessitating repeated attempts. She notes this is a familial issue.    FLUID INTAKE:  She reports poor fluid intake of approximately 12 ounces of water daily, primarily taking water only with medications.         Physical Exam    General: No acute distress. Well-developed. Well-nourished.  Eyes: EOMI. Sclerae anicteric.  HENT: Normocephalic. Atraumatic. Nares patent. Moist " oral mucosa.  Ears: Bilateral TMs clear. Bilateral EACs clear.  Cardiovascular: Regular rate. Regular rhythm. No murmurs. No rubs. No gallops. Normal S1, S2.  Respiratory: Normal respiratory effort. Clear to auscultation bilaterally. No rales. No rhonchi. No wheezing.  Abdomen: Soft. Non-tender. Non-distended. Normoactive bowel sounds.  Musculoskeletal: No  obvious deformity.  Extremities: No lower extremity edema.  Neurological: Alert & oriented x3. No slurred speech. Normal gait.  Psychiatric: Normal mood. Normal affect. Good insight. Good judgment.  Skin: Warm. Dry. No rash.         Assessment & Plan    U09.9 Post COVID-19 condition, unspecified  R53.82 Chronic fatigue, unspecified  R06.02 Shortness of breath  N18.32 Chronic kidney disease, stage 3b  E87.6 Hypokalemia  E61.1 Iron deficiency  R20.2 Paresthesia of skin  J31.0 Chronic rhinitis  K59.00 Constipation, unspecified  Z58.6 Inadequate drinking-water supply  I25.810 Atherosclerosis of coronary artery bypass graft(s) without angina pectoris  J94.9 Pleural condition, unspecified    POST-COVID CONDITION:  - Assessed shortness of breath and chronic fatigue as likely multifactorial, with post-COVID sequelae being a significant contributor alongside deconditioning and weight.  - Ni reports persistent lack of energy and shortness of breath since coronavirus infection.  - Discussed these post-COVID symptoms with the patient and their impact on quality of life.    CHRONIC FATIGUE:  - Ni reports chronic fatigue and lack of energy.  - This appears to be related to post-COVID condition as noted above.  - Referred to weight loss specialist to address fatigue and improve overall quality of life.    SHORTNESS OF BREATH:  - Assessed as likely multifactorial due to deconditioning, weight, and possible post-COVID effects.  - Normal BNP from a few months ago rules out heart failure.  - Ni reports shortness of breath with exertion and resulting exhaustion.  - Ordered  pulmonary function tests (PFTs) for further evaluation.    CHRONIC KIDNEY DISEASE:  - Reviewed labs showing GFR slightly low at 53, likely representing age-related decline.  - Explained GFR as kidney filtration rate to the patient.  - Discussed importance of hydration for renal function and advised patient to stay well hydrated to support kidney health.    HYPOKALEMIA:  - Noted potassium consistently low at 3.4, likely due to hydrochlorothiazide.  - Prescribed potassium chloride supplement daily to address hypokalemia.  - Educated patient on potassium food sources, noting bananas are not as high in potassium as commonly believed.  - Continued hydrochlorothiazide therapy.    IRON DEFICIENCY:  - Explained iron studies are marginally low but patient is not anemic.  - Reviewed labs showing stable CBC with no anemia.  - Advised to hold off on iron supplements due to constipation concerns.    PARESTHESIA:  - Ni reports numbness in hands lasting about 5 minutes.  - Evaluated as possibly related to vitamin deficiency.  - Discussed potential causes with the patient and referred to neurology for further evaluation.    CHRONIC RHINITIS:  - Ni reports nasal passage issues with white mucus, possibly viral or allergic in nature.  - Prescribed Xyzal at nighttime and recommended additional sinus medications to address these issues.    CONSTIPATION:  - Ni reports constipation, worsened by iron supplements.  - Discussed importance of hydration for prevention and recommended Miralax daily for management.    INADEQUATE WATER INTAKE:  - Ni reports drinking only 12 oz of water daily, which is inadequate and potentially affecting overall health including kidney function and constipation.  - Advised increasing daily water intake to 60-80 oz, preferably spring water.    CORONARY ARTERY DISEASE:  - Assessed chest pain as non-cardiac based on description and recent normal cardiac catheterization showing mild non-obstructive  coronary artery disease.  - Normal BNP from a few months ago indicates no signs of heart failure.  - Explained different types of chest pain (cardiac vs. non-cardia  c) to the patient and discussed cardiac health.    PLEURAL CONDITION:  - Ni reports chest pain that occurs with regular breathing, lasting for a few breaths.  - Pain is not sharp and was not reproducible during the visit.  - Discussed chest pain characteristics, considering non-cardiac causes such as pleural irritation.    WEIGHT MANAGEMENT:  - Discussed benefits of weight loss for overall health, breathing, and post-COVID symptoms.  - Recommend reducing consumption of high-sugar foods (including watermelon) and increasing physical activity to improve metabolism.  - Referred to bariatric medicine.  - Ordered DEXA scan.  - Previously referred for physical therapy.    FOLLOW-UP:  - Ni to schedule appointments on 7th floor for DEXA scan, pulmonary function testing, and bariatric medicine consultation.  - Contact office if unable to schedule any ordered tests or referrals.  - Follow up regarding endoscopy scheduling issue with [facility].              This note was generated with the assistance of ambient listening technology. Verbal consent was obtained by the patient and accompanying visitor(s) for the recording of patient appointment to facilitate this note. I attest to having reviewed and edited the generated note for accuracy, though some syntax or spelling errors may persist. Please contact the author of this note for any clarification.

## 2025-08-04 ENCOUNTER — HOSPITAL ENCOUNTER (OUTPATIENT)
Dept: PULMONOLOGY | Facility: HOSPITAL | Age: 75
Discharge: HOME | End: 2025-08-04
Attending: INTERNAL MEDICINE
Payer: MEDICARE

## 2025-08-04 DIAGNOSIS — R06.09 DYSPNEA ON EXERTION: Primary | ICD-10-CM

## 2025-08-04 PROCEDURE — 94726 PLETHYSMOGRAPHY LUNG VOLUMES: CPT

## 2025-08-04 PROCEDURE — 94010 BREATHING CAPACITY TEST: CPT

## 2025-08-04 PROCEDURE — 94729 DIFFUSING CAPACITY: CPT

## 2025-08-13 ENCOUNTER — HOSPITAL ENCOUNTER (OUTPATIENT)
Dept: RADIOLOGY | Facility: CLINIC | Age: 75
Discharge: HOME | End: 2025-08-13
Attending: INTERNAL MEDICINE
Payer: MEDICARE

## 2025-08-13 DIAGNOSIS — Z78.0 MENOPAUSE: ICD-10-CM

## 2025-08-13 PROCEDURE — 77081 DXA BONE DENSITY APPENDICULR: CPT | Mod: TC,PO

## 2025-08-27 ENCOUNTER — CLINICAL SUPPORT (OUTPATIENT)
Dept: REHABILITATION | Facility: HOSPITAL | Age: 75
End: 2025-08-27
Attending: INTERNAL MEDICINE
Payer: MEDICARE

## 2025-08-27 DIAGNOSIS — R29.898 LEG WEAKNESS, BILATERAL: Primary | ICD-10-CM

## 2025-08-27 DIAGNOSIS — R26.2 DIFFICULTY WALKING: ICD-10-CM

## 2025-08-27 PROCEDURE — 97110 THERAPEUTIC EXERCISES: CPT | Mod: PN

## 2025-08-27 PROCEDURE — 97161 PT EVAL LOW COMPLEX 20 MIN: CPT | Mod: PN

## 2025-09-02 ENCOUNTER — OFFICE VISIT (OUTPATIENT)
Dept: CARDIOLOGY | Facility: CLINIC | Age: 75
End: 2025-09-02
Payer: MEDICARE

## 2025-09-02 VITALS
DIASTOLIC BLOOD PRESSURE: 80 MMHG | OXYGEN SATURATION: 95 % | SYSTOLIC BLOOD PRESSURE: 142 MMHG | HEART RATE: 60 BPM | HEIGHT: 62 IN | BODY MASS INDEX: 53.92 KG/M2 | WEIGHT: 293 LBS | RESPIRATION RATE: 15 BRPM

## 2025-09-02 DIAGNOSIS — G47.33 OSA (OBSTRUCTIVE SLEEP APNEA): ICD-10-CM

## 2025-09-02 DIAGNOSIS — E78.49 OTHER HYPERLIPIDEMIA: ICD-10-CM

## 2025-09-02 DIAGNOSIS — I10 PRIMARY HYPERTENSION: Primary | ICD-10-CM

## 2025-09-02 PROCEDURE — 93000 ELECTROCARDIOGRAM COMPLETE: CPT | Mod: S$GLB,,, | Performed by: INTERNAL MEDICINE

## 2025-09-02 PROCEDURE — 3288F FALL RISK ASSESSMENT DOCD: CPT | Mod: CPTII,S$GLB,, | Performed by: INTERNAL MEDICINE

## 2025-09-02 PROCEDURE — 99999 PR PBB SHADOW E&M-EST. PATIENT-LVL V: CPT | Mod: PBBFAC,,, | Performed by: INTERNAL MEDICINE

## 2025-09-02 PROCEDURE — 99214 OFFICE O/P EST MOD 30 MIN: CPT | Mod: S$GLB,,, | Performed by: INTERNAL MEDICINE

## 2025-09-02 PROCEDURE — 1125F AMNT PAIN NOTED PAIN PRSNT: CPT | Mod: CPTII,S$GLB,, | Performed by: INTERNAL MEDICINE

## 2025-09-02 PROCEDURE — 3077F SYST BP >= 140 MM HG: CPT | Mod: CPTII,S$GLB,, | Performed by: INTERNAL MEDICINE

## 2025-09-02 PROCEDURE — 3008F BODY MASS INDEX DOCD: CPT | Mod: CPTII,S$GLB,, | Performed by: INTERNAL MEDICINE

## 2025-09-02 PROCEDURE — 1159F MED LIST DOCD IN RCRD: CPT | Mod: CPTII,S$GLB,, | Performed by: INTERNAL MEDICINE

## 2025-09-02 PROCEDURE — 3079F DIAST BP 80-89 MM HG: CPT | Mod: CPTII,S$GLB,, | Performed by: INTERNAL MEDICINE

## 2025-09-02 PROCEDURE — 1101F PT FALLS ASSESS-DOCD LE1/YR: CPT | Mod: CPTII,S$GLB,, | Performed by: INTERNAL MEDICINE

## 2025-09-02 PROCEDURE — 3044F HG A1C LEVEL LT 7.0%: CPT | Mod: CPTII,S$GLB,, | Performed by: INTERNAL MEDICINE

## 2025-09-03 LAB
OHS QRS DURATION: 88 MS
OHS QTC CALCULATION: 413 MS

## (undated) DEVICE — OMNIPAQUE CONTRAST 350MG/100ML

## (undated) DEVICE — WIRE GUIDE SAFE-T-J .035 260CM

## (undated) DEVICE — CATH DXTERITY JL35 100CM 5FR

## (undated) DEVICE — ANGIOTOUCH KIT

## (undated) DEVICE — PAD DEFIB CADENCE ADULT R2

## (undated) DEVICE — KIT GLIDESHEATH SLEND 6FR 10CM

## (undated) DEVICE — PACK CATH LAB

## (undated) DEVICE — CATH EMPULSE ANGLED 5FR PIGTAI

## (undated) DEVICE — KIT SYR REUSABLE

## (undated) DEVICE — BAND TR WITH INFLATOR

## (undated) DEVICE — CATH OPTITORQUE TIG 4.0 100 CM

## (undated) DEVICE — KIT MANIFOLD LOW PRESS TUBING

## (undated) DEVICE — PAD RADI FEMORAL